# Patient Record
Sex: FEMALE | Race: WHITE | Employment: OTHER | URBAN - METROPOLITAN AREA
[De-identification: names, ages, dates, MRNs, and addresses within clinical notes are randomized per-mention and may not be internally consistent; named-entity substitution may affect disease eponyms.]

---

## 2017-05-03 ENCOUNTER — TRANSFERRED RECORDS (OUTPATIENT)
Dept: HEALTH INFORMATION MANAGEMENT | Facility: CLINIC | Age: 77
End: 2017-05-03

## 2017-06-07 ENCOUNTER — TRANSFERRED RECORDS (OUTPATIENT)
Dept: HEALTH INFORMATION MANAGEMENT | Facility: CLINIC | Age: 77
End: 2017-06-07

## 2017-06-27 ENCOUNTER — TELEPHONE (OUTPATIENT)
Dept: FAMILY MEDICINE | Facility: CLINIC | Age: 77
End: 2017-06-27

## 2017-06-27 DIAGNOSIS — G50.0 TRIGEMINAL NEURALGIA: ICD-10-CM

## 2017-06-27 RX ORDER — CARBAMAZEPINE 100 MG/1
100 TABLET, EXTENDED RELEASE ORAL 2 TIMES DAILY
Qty: 60 TABLET | Refills: 0 | Status: SHIPPED | OUTPATIENT
Start: 2017-06-27 | End: 2017-06-27

## 2017-06-27 RX ORDER — CARBAMAZEPINE 100 MG/1
100 TABLET, EXTENDED RELEASE ORAL 2 TIMES DAILY
Qty: 60 TABLET | Refills: 0 | Status: SHIPPED | OUTPATIENT
Start: 2017-06-27 | End: 2017-08-11

## 2017-06-27 NOTE — TELEPHONE ENCOUNTER
Reason for Call:  Medication or medication refill:    Do you use a [facility]?  Name of the pharmacy and phone number for the current request:  [facility].     Name of the medication requested: carBAMazepine (TEGRETOL XR) 100 MG 12 hr tablet    Other request:     Can we leave a detailed message on this number? YES    Phone number patient can be reached at: Cell number on file:    Telephone Information:   Mobile [phone]       Best Time: anytime    Call taken on 6/27/2017 at 10:40 AM by Layla Díaz

## 2017-06-27 NOTE — TELEPHONE ENCOUNTER
Rx for carBAMazepine (TEGRETOL XR) 100 MG 12 hr tablet faxed to [facility] in [facility].

## 2017-06-27 NOTE — TELEPHONE ENCOUNTER
Medication is being filled for 1 time refill only due to:  Patient needs to be seen because it has been more than one year since last visit.   Call to narrow down pharmacy requested , apt ,made for annual

## 2017-08-09 NOTE — PROGRESS NOTES
SUBJECTIVE:   Bee Yepez is a 76 year old female who presents for Preventive Visit.      Are you in the first 12 months of your Medicare Part B coverage?  No    Healthy Habits:    Do you get at least three servings of calcium containing foods daily (dairy, green leafy vegetables, etc.)? no, taking calcium     Amount of exercise or daily activities, outside of work: walking daily, bowling 2x/ week    Problems taking medications regularly No    Medication side effects: No    Have you had an eye exam in the past two years? yes    Do you see a dentist twice per year? yes    Do you have sleep apnea, excessive snoring or daytime drowsiness?no    COGNITIVE SCREEN  1) Repeat 3 items (Banana, Sunrise, Chair)    2) Clock draw: NORMAL  3) 3 item recall: Recalls 3 objects  Results: 3 items recalled: COGNITIVE IMPAIRMENT LESS LIKELY    Mini-CogTM Copyright S Quinton. Licensed by the author for use in [facility]; reprinted with permission ([email or web address]@[email or web address]). All rights reserved.        No additional concerns.   Med refills.   Declines screening DEXA or PNA shot.     Patient informed that anything we discuss that is not related to preventative medicine, may be billed for; patient verbalizes understanding.      Reviewed and updated as needed this visit by clinical staff  Tobacco  Allergies  Meds  Problems         Reviewed and updated as needed this visit by Provider        Social History   Substance Use Topics     Smoking status: Former Smoker     Smokeless tobacco: Not on file     Alcohol use No       The patient does not drink >3 drinks per day nor >7 drinks per week.    Today's PHQ-2 Score:   PHQ-2 ( 1999 Pfizer) 8/9/2017 6/17/2016   Q1: Little interest or pleasure in doing things 0 0   Q2: Feeling down, depressed or hopeless 0 0   PHQ-2 Score 0 0         Do you feel safe in your environment - Yes    Do you have a Health Care Directive?: No: Advance care planning reviewed with patient; information given to  patient to review.      Current providers sharing in care for this patient include: Patient Care Team:  Rosemary Rahman MD as PCP - General (Family Practice)      Hearing impairment: No    Ability to successfully perform activities of daily living: Yes, no assistance needed     Fall risk:  Fallen 2 or more times in the past year?: No  Any fall with injury in the past year?: No    Home safety:  throw rugs in the hallway      The following health maintenance items are reviewed in Epic and correct as of today:  Health Maintenance   Topic Date Due     DEXA SCAN SCREENING (SYSTEM ASSIGNED)  10/21/2004     PNEUMOCOCCAL (1 of 2 - PCV13) 10/21/2004     INFLUENZA VACCINE (SYSTEM ASSIGNED)  2017     LIPID SCREEN Q5 YR FEMALE (SYSTEM ASSIGNED)  2018     FALL RISK ASSESSMENT  2018     ADVANCE DIRECTIVE PLANNING Q5 YRS  2020     TETANUS IMMUNIZATION (SYSTEM ASSIGNED)  2025     Patient Active Problem List   Diagnosis     Family history of breast cancer     Family history of colon cancer     Advanced directives, counseling/discussion     Trigeminal neuralgia     Osteopenia, unspecified location     Past Surgical History:   Procedure Laterality Date     CHOLECYSTECTOMY      in the 1970s     TUBAL LIGATION         Social History   Substance Use Topics     Smoking status: Former Smoker     Smokeless tobacco: Not on file     Alcohol use No     Family History   Problem Relation Age of Onset     DIABETES No family hx of      Coronary Artery Disease Father      had a pacemaker     Breast Cancer Sister       at age 45     Colon Cancer Sister      Dx in her 60s     Lung Cancer Sister      Dx in her 60s         Current Outpatient Prescriptions   Medication Sig Dispense Refill     calcium carbonate-vitamin D 600-400 MG-UNIT CHEW Take 1 chew tab by mouth 2 times daily       latanoprost (XALATAN) 0.005 % ophthalmic solution Place 1 drop into both eyes At Bedtime 2.5 mL 0     multivitamin, therapeutic with  "minerals (MULTI-VITAMIN) TABS tablet Take 1 tablet by mouth daily 400 tablet 0     carBAMazepine (TEGRETOL XR) 100 MG 12 hr tablet Take 1 tablet (100 mg) by mouth 2 times daily 60 tablet 11     [DISCONTINUED] carBAMazepine (TEGRETOL XR) 100 MG 12 hr tablet Take 1 tablet (100 mg) by mouth 2 times daily 60 tablet 0     [DISCONTINUED] carBAMazepine (TEGRETOL XR) 100 MG 12 hr tablet Take 1 tablet (100 mg) by mouth 2 times daily 60 tablet 0     No Known Allergies      Pneumonia Vaccine: Declines    ROS:  Constitutional, HEENT, cardiovascular, pulmonary, GI, , musculoskeletal, neuro, skin, endocrine and psych systems are negative, except as otherwise noted.      OBJECTIVE:   /70  Pulse 85  Temp 97.6  F (36.4  C) (Tympanic)  Ht 5' 5\" (1.651 m)  Wt 165 lb 6.4 oz (75 kg)  SpO2 97%  Breastfeeding? No  BMI 27.52 kg/m2 Estimated body mass index is 27.52 kg/(m^2) as calculated from the following:    Height as of this encounter: 5' 5\" (1.651 m).    Weight as of this encounter: 165 lb 6.4 oz (75 kg).  EXAM:   GENERAL: healthy, alert and no distress  EYES: Eyes grossly normal to inspection, PERRL and conjunctivae and sclerae normal  HENT: ear canals and TM's normal, nose and mouth without ulcers or lesions  NECK: no adenopathy, no asymmetry, masses, or scars and thyroid normal to palpation  RESP: lungs clear to auscultation - no rales, rhonchi or wheezes  BREAST: normal without masses, tenderness or nipple discharge and no palpable axillary masses or adenopathy  CV: regular rate and rhythm, normal S1 S2, no S3 or S4, no murmur, click or rub, no peripheral edema and peripheral pulses strong  ABDOMEN: soft, nontender, no hepatosplenomegaly, no masses and bowel sounds normal  MS: no gross musculoskeletal defects noted, no edema  SKIN: no suspicious lesions or rashes  NEURO: Normal strength and tone, mentation intact and speech normal  PSYCH: mentation appears normal, affect normal/bright    DATA  Labs in process. " "    ASSESSMENT / PLAN:   Bee was seen today for physical.    Diagnoses and all orders for this visit:    Routine general medical examination at health care facility    Trigeminal neuralgia  -     Comprehensive metabolic panel  -     Refill: carBAMazepine (TEGRETOL XR) 100 MG 12 hr tablet; Take 1 tablet (100 mg) by mouth 2 times daily    Lipid screening  -     Lipid Profile (Chol, Trig, HDL, LDL calc)    Osteopenia, unspecified location  On Ca + D  Patient declines follow up DEXA screening    Advanced directives, counseling/discussion  Literature was provided to assist patient in preparing an advanced directive.        End of Life Planning:  Patient currently has an advanced directive: No.  I have verified the patient's ablity to prepare an advanced directive/make health care decisions.  Literature was provided to assist patient in preparing an advanced directive.    COUNSELING:  Reviewed preventive health counseling, as reflected in patient instructions       Regular exercise       Healthy diet/nutrition       Vision screening       Hearing screening    BP Screening:   Last 3 BP Readings:    BP Readings from Last 3 Encounters:   08/11/17 127/70   06/17/16 136/65   01/04/16 119/70       The following was recommended to the patient:  Re-screen BP within a year and recommended lifestyle modifications    Estimated body mass index is 27.52 kg/(m^2) as calculated from the following:    Height as of this encounter: 5' 5\" (1.651 m).    Weight as of this encounter: 165 lb 6.4 oz (75 kg).     reports that she has quit smoking. She does not have any smokeless tobacco history on file.        Appropriate preventive services were discussed with this patient, including applicable screening as appropriate for cardiovascular disease, diabetes, osteopenia/osteoporosis, and glaucoma.  As appropriate for age/gender, discussed screening for colorectal cancer, prostate cancer, breast cancer, and cervical cancer. Checklist reviewing " preventive services available has been given to the patient.    Reviewed patients plan of care and provided an AVS. The Basic Care Plan (routine screening as documented in Health Maintenance) for Bee meets the Care Plan requirement. This Care Plan has been established and reviewed with the Patient.    Counseling Resources:  ATP IV Guidelines  Pooled Cohorts Equation Calculator  Breast Cancer Risk Calculator  FRAX Risk Assessment  ICSI Preventive Guidelines  Dietary Guidelines for Americans, 2010  USDA's MyPlate  ASA Prophylaxis  Lung CA Screening    Follow up annually and as needed thoughout the year.    Rosemary Rahman MD  [facility]

## 2017-08-11 ENCOUNTER — OFFICE VISIT (OUTPATIENT)
Dept: FAMILY MEDICINE | Facility: CLINIC | Age: 77
End: 2017-08-11
Payer: COMMERCIAL

## 2017-08-11 VITALS
HEIGHT: 65 IN | BODY MASS INDEX: 27.56 KG/M2 | WEIGHT: 165.4 LBS | HEART RATE: 85 BPM | TEMPERATURE: 97.6 F | SYSTOLIC BLOOD PRESSURE: 127 MMHG | OXYGEN SATURATION: 97 % | DIASTOLIC BLOOD PRESSURE: 70 MMHG

## 2017-08-11 DIAGNOSIS — Z00.00 ROUTINE GENERAL MEDICAL EXAMINATION AT HEALTH CARE FACILITY: Primary | ICD-10-CM

## 2017-08-11 DIAGNOSIS — Z71.89 ADVANCED DIRECTIVES, COUNSELING/DISCUSSION: ICD-10-CM

## 2017-08-11 DIAGNOSIS — Z13.220 LIPID SCREENING: ICD-10-CM

## 2017-08-11 DIAGNOSIS — M85.80 OSTEOPENIA, UNSPECIFIED LOCATION: ICD-10-CM

## 2017-08-11 DIAGNOSIS — G50.0 TRIGEMINAL NEURALGIA: ICD-10-CM

## 2017-08-11 LAB
ALBUMIN SERPL-MCNC: 3.6 G/DL (ref 3.4–5)
ALP SERPL-CCNC: 78 U/L (ref 40–150)
ALT SERPL W P-5'-P-CCNC: 32 U/L (ref 0–50)
ANION GAP SERPL CALCULATED.3IONS-SCNC: 4 MMOL/L (ref 3–14)
AST SERPL W P-5'-P-CCNC: 20 U/L (ref 0–45)
BILIRUB SERPL-MCNC: 0.4 MG/DL (ref 0.2–1.3)
BUN SERPL-MCNC: 14 MG/DL (ref 7–30)
CALCIUM SERPL-MCNC: 9.1 MG/DL (ref 8.5–10.1)
CHLORIDE SERPL-SCNC: 105 MMOL/L (ref 94–109)
CHOLEST SERPL-MCNC: 208 MG/DL
CO2 SERPL-SCNC: 29 MMOL/L (ref 20–32)
CREAT SERPL-MCNC: 1.08 MG/DL (ref 0.52–1.04)
GFR SERPL CREATININE-BSD FRML MDRD: 49 ML/MIN/1.7M2
GLUCOSE SERPL-MCNC: 101 MG/DL (ref 70–99)
HDLC SERPL-MCNC: 51 MG/DL
LDLC SERPL CALC-MCNC: 129 MG/DL
NONHDLC SERPL-MCNC: 157 MG/DL
POTASSIUM SERPL-SCNC: 4.8 MMOL/L (ref 3.4–5.3)
PROT SERPL-MCNC: 7.4 G/DL (ref 6.8–8.8)
SODIUM SERPL-SCNC: 138 MMOL/L (ref 133–144)
TRIGL SERPL-MCNC: 141 MG/DL

## 2017-08-11 PROCEDURE — 36415 COLL VENOUS BLD VENIPUNCTURE: CPT | Performed by: FAMILY MEDICINE

## 2017-08-11 PROCEDURE — 80061 LIPID PANEL: CPT | Performed by: FAMILY MEDICINE

## 2017-08-11 PROCEDURE — 80053 COMPREHEN METABOLIC PANEL: CPT | Performed by: FAMILY MEDICINE

## 2017-08-11 PROCEDURE — 99397 PER PM REEVAL EST PAT 65+ YR: CPT | Performed by: FAMILY MEDICINE

## 2017-08-11 RX ORDER — MULTIPLE VITAMINS W/ MINERALS TAB 9MG-400MCG
1 TAB ORAL DAILY
Qty: 400 TABLET | Refills: 0 | COMMUNITY
Start: 2017-08-11 | End: 2019-02-07

## 2017-08-11 RX ORDER — CARBAMAZEPINE 100 MG/1
100 TABLET, EXTENDED RELEASE ORAL 2 TIMES DAILY
Qty: 60 TABLET | Refills: 0 | Status: SHIPPED | OUTPATIENT
Start: 2017-08-11 | End: 2017-08-11

## 2017-08-11 RX ORDER — CARBAMAZEPINE 100 MG/1
100 TABLET, EXTENDED RELEASE ORAL 2 TIMES DAILY
Qty: 60 TABLET | Refills: 11 | Status: SHIPPED | OUTPATIENT
Start: 2017-08-11 | End: 2017-12-01

## 2017-08-11 RX ORDER — LATANOPROST 50 UG/ML
1 SOLUTION/ DROPS OPHTHALMIC AT BEDTIME
Qty: 2.5 ML | Refills: 0 | COMMUNITY
Start: 2017-08-11 | End: 2021-02-11

## 2017-08-11 NOTE — LETTER
Bee Yepez  [address] [place] [address]  [place] [address]        August 16, 2017          Dear ,    We are writing to inform you of your test results.      Your recent labs shows;     Cholesterol panel showed that your cholesterol is slightly elevated but improved compared to 4 years ago.   Your LDL (bad cholesterol) goal is < 130. Yours was 129. HDL (good cholesterol) was within normal range.   No cholesterol lowering medications are recommended at this time.     Complete Metabolic Panel (panel that checks liver function, kidney function and electrolytes)   Showed slight decline in kidney function otherwise normal.   Increase your fluid intake.     Recommend that we repeat in the next 2 weeks. Please schedule a lab only appointment.       Resulted Orders   Lipid Profile (Chol, Trig, HDL, LDL calc)   Result Value Ref Range    Cholesterol 208 (H) <200 mg/dL      Comment:      Desirable:       <200 mg/dl    Triglycerides 141 <150 mg/dL      Comment:      Fasting specimen    HDL Cholesterol 51 >49 mg/dL    LDL Cholesterol Calculated 129 (H) <100 mg/dL      Comment:      Above desirable:  100-129 mg/dl   Borderline High:  130-159 mg/dL   High:             160-189 mg/dL   Very high:       >189 mg/dl      Non HDL Cholesterol 157 (H) <130 mg/dL      Comment:      Above Desirable:  130-159 mg/dl   Borderline high:  160-189 mg/dl   High:             190-219 mg/dl   Very high:       >219 mg/dl     Comprehensive metabolic panel   Result Value Ref Range    Sodium 138 133 - 144 mmol/L    Potassium 4.8 3.4 - 5.3 mmol/L    Chloride 105 94 - 109 mmol/L    Carbon Dioxide 29 20 - 32 mmol/L    Anion Gap 4 3 - 14 mmol/L    Glucose 101 (H) 70 - 99 mg/dL      Comment:      Fasting specimen    Urea Nitrogen 14 7 - 30 mg/dL    Creatinine 1.08 (H) 0.52 - 1.04 mg/dL    GFR Estimate 49 (L) >60 mL/min/1.7m2      Comment:      Non  GFR Calc    GFR Estimate If Black 59 (L) >60 mL/min/1.7m2      Comment:        American GFR Calc    Calcium 9.1 8.5 - 10.1 mg/dL    Bilirubin Total 0.4 0.2 - 1.3 mg/dL    Albumin 3.6 3.4 - 5.0 g/dL    Protein Total 7.4 6.8 - 8.8 g/dL    Alkaline Phosphatase 78 40 - 150 U/L    ALT 32 0 - 50 U/L    AST 20 0 - 45 U/L       If you have any questions or concerns, please call the clinic at the number listed above.       Sincerely,    Rosemary Rahman MD/lashell

## 2017-08-11 NOTE — NURSING NOTE
"Chief Complaint   Patient presents with     Physical       Initial /70  Pulse 85  Temp 97.6  F (36.4  C) (Tympanic)  Ht 5' 5\" (1.651 m)  Wt 165 lb 6.4 oz (75 kg)  SpO2 97%  Breastfeeding? No  BMI 27.52 kg/m2 Estimated body mass index is 27.52 kg/(m^2) as calculated from the following:    Height as of this encounter: 5' 5\" (1.651 m).    Weight as of this encounter: 165 lb 6.4 oz (75 kg).  Medication Reconciliation: complete     HM: pt declines PNA shot and dexa scan.     Kavya Alonso MA      "

## 2017-08-11 NOTE — MR AVS SNAPSHOT
After Visit Summary   8/11/2017    Bee Yepez    MRN: [id number]           Patient Information     Date Of Birth          1940        Visit Information        Provider Department      8/11/2017 9:30 AM Rosemary Rahman MD [facility]        Today's Diagnoses     Routine general medical examination at health care facility    -  1    Trigeminal neuralgia        Lipid screening        Osteopenia, unspecified location          Care Instructions      Preventive Health Recommendations    Female Ages 65 +    Yearly exam:     See your health care provider every year in order to  o Review health changes.   o Discuss preventive care.    o Review your medicines if your doctor has prescribed any.      You no longer need a yearly Pap test unless you've had an abnormal Pap test in the past 10 years. If you have vaginal symptoms, such as bleeding or discharge, be sure to talk with your provider about a Pap test.      Every 1 to 2 years, have a mammogram.  If you are over 69, talk with your health care provider about whether or not you want to continue having screening mammograms.      Every 10 years, have a colonoscopy. Or, have a yearly FIT test (stool test). These exams will check for colon cancer.       Have a cholesterol test every 5 years, or more often if your doctor advises it.       Have a diabetes test (fasting glucose) every three years. If you are at risk for diabetes, you should have this test more often.       At age 65, have a bone density scan (DEXA) to check for osteoporosis (brittle bone disease).    Shots:    Get a flu shot each year.    Get a tetanus shot every 10 years.    Talk to your doctor about your pneumonia vaccines. There are now two you should receive - Pneumovax (PPSV 23) and Prevnar (PCV 13).    Talk to your doctor about the shingles vaccine.    Talk to your doctor about the hepatitis B vaccine.    Nutrition:     Eat at least 5 servings of fruits and vegetables  "each day.      Eat whole-grain bread, whole-wheat pasta and brown rice instead of white grains and rice.      Talk to your provider about Calcium and Vitamin D.     Lifestyle    Exercise at least 150 minutes a week (30 minutes a day, 5 days a week). This will help you control your weight and prevent disease.      Limit alcohol to one drink per day.      No smoking.       Wear sunscreen to prevent skin cancer.       See your dentist twice a year for an exam and cleaning.      See your eye doctor every 1 to 2 years to screen for conditions such as glaucoma, macular degeneration and cataracts.          Follow-ups after your visit        Follow-up notes from your care team     Return in about 1 year (around 8/11/2018).      Who to contact     Normal or non-critical lab and imaging results will be communicated to you by [employer], letter or phone within 4 business days after the clinic has received the results. If you do not hear from us within 7 days, please contact the clinic through [employer] or phone. If you have a critical or abnormal lab result, we will notify you by phone as soon as possible.  Submit refill requests through [employer] or call your pharmacy and they will forward the refill request to us. Please allow 3 business days for your refill to be completed.          If you need to speak with a  for additional information , please call: [phone]             Additional Information About Your Visit        [employer] Information     [employer] lets you send messages to your doctor, view your test results, renew your prescriptions, schedule appointments and more. To sign up, go to [email or web address] . Click on \"Log in\" on the left side of the screen, which will take you to the Welcome page. Then click on \"Sign up Now\" on the right side of the page.     You will be asked to enter the access code listed below, as well as some personal information. Please follow the directions to create your username " "and password.     Your access code is: [email or web address]  Expires: 10/14/2017  5:34 PM     Your access code will  in 90 days. If you need help or a new code, please call your [place] clinic or [phone].        Care EveryWhere ID     This is your Care EveryWhere ID. This could be used by other organizations to access your [facility] medical records  [id number]        Your Vitals Were     Pulse Temperature Height Pulse Oximetry Breastfeeding? BMI (Body Mass Index)    85 97.6  F (36.4  C) (Tympanic) 5' 5\" (1.651 m) 97% No 27.52 kg/m2       Blood Pressure from Last 3 Encounters:   17 127/70   16 136/65   16 119/70    Weight from Last 3 Encounters:   17 165 lb 6.4 oz (75 kg)   16 165 lb (74.8 kg)   16 168 lb 3.2 oz (76.3 kg)              We Performed the Following     Comprehensive metabolic panel     Lipid Profile (Chol, Trig, HDL, LDL calc)          Today's Medication Changes          These changes are accurate as of: 17  9:50 AM.  If you have any questions, ask your nurse or doctor.               Start taking these medicines.        Dose/Directions    carBAMazepine 100 MG 12 hr tablet   Commonly known as:  TEGretol XR   Used for:  Trigeminal neuralgia   Started by:  Rosemary Rahman MD        Dose:  100 mg   Take 1 tablet (100 mg) by mouth 2 times daily   Quantity:  60 tablet   Refills:  11            Where to get your medicines      Some of these will need a paper prescription and others can be bought over the counter.  Ask your nurse if you have questions.     Bring a paper prescription for each of these medications     carBAMazepine 100 MG 12 hr tablet                Primary Care Provider Office Phone # Fax #    Rosemary Rahman [phone]       [address] [place] [address]        Equal Access to Services     [facility] YOLY AH: Hadii helio ku hadasho Soomaali, waaxda luqadaha, qaybta kaalmada shruthi, mark anthony bustillo. " So [facility] [phone].    ATENCIÓN: Si lilia pacheco, tiene a narayanan disposición servicios gratuitos de asistencia lingüística. Ankur rebolledo [phone].    We comply with applicable federal civil rights laws and [place] laws. We do not discriminate on the basis of race, color, national origin, age, disability sex, sexual orientation or gender identity.            Thank you!     Thank you for choosing [facility]  for your care. Our goal is always to provide you with excellent care. Hearing back from our patients is one way we can continue to improve our services. Please take a few minutes to complete the written survey that you may receive in the mail after your visit with us. Thank you!             Your Updated Medication List - Protect others around you: Learn how to safely use, store and throw away your medicines at www.disposemymeds.org.          This list is accurate as of: 8/11/17  9:50 AM.  Always use your most recent med list.                   Brand Name Dispense Instructions for use Diagnosis    calcium carbonate-vitamin D 600-400 MG-UNIT Chew      Take 1 chew tab by mouth 2 times daily        carBAMazepine 100 MG 12 hr tablet    TEGretol XR    60 tablet    Take 1 tablet (100 mg) by mouth 2 times daily    Trigeminal neuralgia       Multi-vitamin Tabs tablet     400 tablet    Take 1 tablet by mouth daily        XALATAN 0.005 % ophthalmic solution   Generic drug:  latanoprost     2.5 mL    Place 1 drop into both eyes At Bedtime

## 2017-08-15 DIAGNOSIS — N28.9 RENAL INSUFFICIENCY: Primary | ICD-10-CM

## 2017-08-29 DIAGNOSIS — N28.9 RENAL INSUFFICIENCY: ICD-10-CM

## 2017-08-29 LAB
CREAT SERPL-MCNC: 0.94 MG/DL (ref 0.52–1.04)
GFR SERPL CREATININE-BSD FRML MDRD: 58 ML/MIN/1.7M2

## 2017-08-29 PROCEDURE — 36415 COLL VENOUS BLD VENIPUNCTURE: CPT | Performed by: FAMILY MEDICINE

## 2017-08-29 PROCEDURE — 82565 ASSAY OF CREATININE: CPT | Performed by: FAMILY MEDICINE

## 2017-09-06 ENCOUNTER — TRANSFERRED RECORDS (OUTPATIENT)
Dept: HEALTH INFORMATION MANAGEMENT | Facility: CLINIC | Age: 77
End: 2017-09-06

## 2017-12-01 ENCOUNTER — TELEPHONE (OUTPATIENT)
Dept: FAMILY MEDICINE | Facility: CLINIC | Age: 77
End: 2017-12-01

## 2017-12-01 DIAGNOSIS — G50.0 TRIGEMINAL NEURALGIA: Primary | ICD-10-CM

## 2017-12-01 RX ORDER — CARBAMAZEPINE 200 MG/1
200 TABLET, EXTENDED RELEASE ORAL 2 TIMES DAILY
Qty: 60 TABLET | Refills: 0 | Status: SHIPPED | OUTPATIENT
Start: 2017-12-01 | End: 2017-12-26

## 2017-12-01 NOTE — TELEPHONE ENCOUNTER
Recommend referral to Neurology. Referral completed  In the interim increase the Tegretol to 200 mg BID. Rx sent, can finish up what she has left.   Ok to try the mouth guard and talk with her dentist about the symptoms

## 2017-12-01 NOTE — TELEPHONE ENCOUNTER
Aleksander states she would like to talk to you about her trigeminal neuralgia symptoms.  Please call.    Thank you.

## 2017-12-01 NOTE — TELEPHONE ENCOUNTER
"Spoke with patient and she reports her trigeminal neuralgia symptoms have been worsening over the past several months.  Symptoms are discribed as pain to her jaw, the same as the past 2 years.  She reports the pain \"attacks\" have been happening daily, sometimes a couple times a day.  She says brushing her teeth or talking will trigger the attack.  She is taking the tegretol as directed and feels it is not working.  She is asking what else can she could try, asks if she should try a mouth guard?  Or talk to her dentist?  Patient asking for your recommendations  Mindi Sarah RN    .      "

## 2017-12-04 ENCOUNTER — TELEPHONE (OUTPATIENT)
Dept: FAMILY MEDICINE | Facility: CLINIC | Age: 77
End: 2017-12-04

## 2017-12-04 NOTE — TELEPHONE ENCOUNTER
Request given to Jim in Xray. Will have report and CD of MRI at  today. Patient may  anytime after 12. Patient informed

## 2017-12-04 NOTE — TELEPHONE ENCOUNTER
Patient needs to  records of MRI Head scan done in 12/15. She also needs the reports that go with it. For a [facility] appt. Please call to notify. Ok to leave a message.

## 2017-12-26 DIAGNOSIS — G50.0 TRIGEMINAL NEURALGIA: ICD-10-CM

## 2017-12-26 NOTE — TELEPHONE ENCOUNTER
Routing refill request to provider for review/approval because:  Labs not current:  Cbc,tsh and carbamazepine level  Increase in dose on 12/1/17

## 2017-12-27 RX ORDER — CARBAMAZEPINE 200 MG/1
200 TABLET, EXTENDED RELEASE ORAL 2 TIMES DAILY
Qty: 60 TABLET | Refills: 0 | Status: SHIPPED | OUTPATIENT
Start: 2017-12-27 | End: 2017-12-28

## 2017-12-28 DIAGNOSIS — G50.0 TRIGEMINAL NEURALGIA: ICD-10-CM

## 2017-12-28 NOTE — TELEPHONE ENCOUNTER
Anticonvulsants Protocol Pmxves35/28 11:09 AM   Review Authorizing provider's last note.     Normal CBC on file in past 6 months    Normal TSH on file in past 6 months    Carbamazepine level within therapeutic range in last 6 months

## 2017-12-29 RX ORDER — CARBAMAZEPINE 200 MG/1
200 TABLET, EXTENDED RELEASE ORAL 2 TIMES DAILY
Qty: 60 TABLET | Refills: 0 | Status: SHIPPED | OUTPATIENT
Start: 2017-12-29 | End: 2019-02-07

## 2018-01-01 DIAGNOSIS — G50.0 TRIGEMINAL NEURALGIA: ICD-10-CM

## 2018-01-02 NOTE — TELEPHONE ENCOUNTER
Carbamazepine  Last Written Prescription Date:  12/01/17  Last Fill Quantity: 60,  # refills: 0   Last Office Visit with G, P or [facility] prescribing provider:  08/11/17   Future Office Visit:

## 2018-01-02 NOTE — TELEPHONE ENCOUNTER
Routing refill request to provider for review/approval because:      Anticonvulsants Protocol Failed1/2 4:05 PM   Review Authorizing provider's last note.     Normal CBC on file in past 6 months    Normal TSH on file in past 6 months    Carbamazepine level within therapeutic range in last 6 months

## 2018-01-03 RX ORDER — CARBAMAZEPINE 200 MG/1
TABLET, EXTENDED RELEASE ORAL
Qty: 60 TABLET | Refills: 0 | Status: SHIPPED | OUTPATIENT
Start: 2018-01-03 | End: 2018-01-10

## 2018-01-05 ENCOUNTER — TRANSFERRED RECORDS (OUTPATIENT)
Dept: HEALTH INFORMATION MANAGEMENT | Facility: CLINIC | Age: 78
End: 2018-01-05

## 2018-01-05 LAB
ALT SERPL-CCNC: 19 U/L (ref 6–29)
AST SERPL-CCNC: 18 U/L (ref 10–35)
CREAT SERPL-MCNC: 1.06 MG/DL (ref 0.6–0.93)
GFR SERPL CREATININE-BSD FRML MDRD: 50 ML/MIN/1.73M2
GLUCOSE SERPL-MCNC: 96 MG/DL (ref 65–99)
POTASSIUM SERPL-SCNC: 4.9 MMOL/L (ref 3.5–5.3)

## 2018-01-10 ENCOUNTER — OFFICE VISIT (OUTPATIENT)
Dept: FAMILY MEDICINE | Facility: CLINIC | Age: 78
End: 2018-01-10
Payer: COMMERCIAL

## 2018-01-10 VITALS
WEIGHT: 170.4 LBS | TEMPERATURE: 98 F | OXYGEN SATURATION: 97 % | HEART RATE: 92 BPM | HEIGHT: 65 IN | SYSTOLIC BLOOD PRESSURE: 138 MMHG | BODY MASS INDEX: 28.39 KG/M2 | DIASTOLIC BLOOD PRESSURE: 82 MMHG

## 2018-01-10 DIAGNOSIS — H61.22 IMPACTED CERUMEN OF LEFT EAR: ICD-10-CM

## 2018-01-10 DIAGNOSIS — H25.9 AGE-RELATED CATARACT OF BOTH EYES, UNSPECIFIED AGE-RELATED CATARACT TYPE: ICD-10-CM

## 2018-01-10 DIAGNOSIS — R03.0 ELEVATED BLOOD PRESSURE READING WITHOUT DIAGNOSIS OF HYPERTENSION: ICD-10-CM

## 2018-01-10 DIAGNOSIS — Z71.89 ADVANCED DIRECTIVES, COUNSELING/DISCUSSION: ICD-10-CM

## 2018-01-10 DIAGNOSIS — Z01.818 PREOP GENERAL PHYSICAL EXAM: Primary | ICD-10-CM

## 2018-01-10 PROCEDURE — 99214 OFFICE O/P EST MOD 30 MIN: CPT | Mod: 25 | Performed by: FAMILY MEDICINE

## 2018-01-10 PROCEDURE — 69209 REMOVE IMPACTED EAR WAX UNI: CPT | Mod: LT | Performed by: FAMILY MEDICINE

## 2018-01-10 RX ORDER — PREDNISOLONE ACETATE 10 MG/ML
SUSPENSION/ DROPS OPHTHALMIC
COMMUNITY
Start: 2018-01-05 | End: 2019-02-07

## 2018-01-10 RX ORDER — KETOROLAC TROMETHAMINE 5 MG/ML
SOLUTION OPHTHALMIC
COMMUNITY
Start: 2018-01-05 | End: 2019-02-07

## 2018-01-10 RX ORDER — GATIFLOXACIN 5 MG/ML
SOLUTION/ DROPS OPHTHALMIC
COMMUNITY
Start: 2018-01-05 | End: 2019-02-07

## 2018-01-10 NOTE — PATIENT INSTRUCTIONS
Before Your Surgery      Call your surgeon if there is any change in your health. This includes signs of a cold or flu (such as a sore throat, runny nose, cough, rash or fever).    Do not smoke, drink alcohol or take over the counter medicine (unless your surgeon or primary care doctor tells you to) for the 24 hours before and after surgery.    If you take prescribed drugs: Follow your doctor s orders about which medicines to take and which to stop until after surgery.    Eating and drinking prior to surgery: follow the instructions from your surgeon    Take a shower or bath the night before surgery. Use the soap your surgeon gave you to gently clean your skin. If you do not have soap from your surgeon, use your regular soap. Do not shave or scrub the surgery site.  Wear clean pajamas and have clean sheets on your bed.     Pre-operative medication management   1. Stop over the counter vitamins and Fish Oils  2. Do not take OTC  Asprin/NSAIDS at least 7-10 days prior to surgery.  4. May take the Carbamazepine the morning of surgery with small sips of water.

## 2018-01-10 NOTE — PROGRESS NOTES
[facility] (formerly [facility] [place]  [address]  [place] [address]  [phone]  Dept: [phone]    PRE-OP EVALUATION:  Today's date: 1/10/2018    Bee Yepez (: 1940) presents for pre-operative evaluation assessment as requested by Dr. Patrick Riedel.  She requires evaluation and anesthesia risk assessment prior to undergoing surgery/procedure for treatment of cataracts .  Proposed procedure: cataract surgery- bilateral    Date of Surgery/ Procedure: 18 (right eye) 18 (left eye)  Time of Surgery/ Procedure: to be determined   Hospital/Surgical Facility: [facility] [place]  Fax number for surgical facility: [phone]  Primary Physician: Rosemary Rahman  Type of Anesthesia Anticipated: to be determined    Patient has a Health Care Directive or Living Will:  NO    1. NO - Do you have a history of heart attack, stroke, stent, bypass or surgery on an artery in the head, neck, heart or legs?  2. NO - Do you ever have any pain or discomfort in your chest?  3. NO - Do you have a history of  Heart Failure?  4. NO - Are you troubled by shortness of breath when: walking on the level, up a slight hill or at night?  5. NO - Do you currently have a cold, bronchitis or other respiratory infection?  6. NO - Do you have a cough, shortness of breath or wheezing?  7. NO - Do you sometimes get pains in the calves of your legs when you walk?  8. NO - Do you or anyone in your family have previous history of blood clots?  9. NO - Do you or does anyone in your family have a serious bleeding problem such as prolonged bleeding following surgeries or cuts?  10. NO - Have you ever had problems with anemia or been told to take iron pills?  11. NO - Have you had any abnormal blood loss such as black, tarry or bloody stools, or abnormal vaginal bleeding?  12. NO - Have you ever had a blood transfusion?  13. NO - Have you or any of your relatives ever had problems with anesthesia?  14. NO - Do you  have sleep apnea, excessive snoring or daytime drowsiness?  15. NO - Do you have any prosthetic heart valves?  16. NO - Do you have prosthetic joints?  17. NO - Is there any chance that you may be pregnant?        HPI:                                                      Brief HPI related to upcoming procedure:     77 year old pleasant female patient of mine here for a Pre-op exam.   She has bilateral cataracts and wishes to proceed with extraction. States that she understands the risks and benefits of the procedure and wishes to proceed.     Feels well. No concerns today.   Has a known history of trigeminal neuralgia on Carbamazepine, currently under the care of a Neurologist.      See problem list for active medical problems.  Problems all longstanding and stable, except as noted/documented.  See ROS for pertinent symptoms related to these conditions.                                                                                                  .    MEDICAL HISTORY:                                                    Patient Active Problem List    Diagnosis Date Noted     Osteopenia, unspecified location 08/11/2017     Priority: Medium     Advanced directives, counseling/discussion 11/30/2015     Priority: Medium     Advance Care Planning 11/30/2015: ACP Review and Resources Provided:  Reviewed chart for advance care plan.  Bee Yepez has no plan or code status on file. Discussed available resources and provided with information.  Added by Kavya Alonso           Trigeminal neuralgia 11/30/2015     Priority: Medium     Family history of breast cancer 10/21/2015     Priority: Medium     Family history of colon cancer 10/21/2015     Priority: Medium      Past Medical History:   Diagnosis Date     Ex-smoker     3 PPD for 25 years ago, quit in 1985.     Family history of breast cancer     sister at age 45     Family history of colon cancer     sister in her 60s     Osteopenia     per patient report.  "    Past Surgical History:   Procedure Laterality Date     CHOLECYSTECTOMY      in the 1970s     TUBAL LIGATION       Current Outpatient Prescriptions   Medication Sig Dispense Refill     carBAMazepine (TEGRETOL XR) 200 MG 12 hr tablet Take 1 tablet (200 mg) by mouth 2 times daily 60 tablet 0     calcium carbonate-vitamin D 600-400 MG-UNIT CHEW Take 1 chew tab by mouth 2 times daily       latanoprost (XALATAN) 0.005 % ophthalmic solution Place 1 drop into both eyes At Bedtime 2.5 mL 0     multivitamin, therapeutic with minerals (MULTI-VITAMIN) TABS tablet Take 1 tablet by mouth daily 400 tablet 0     ketorolac (ACULAR) 0.5 % ophthalmic solution        prednisoLONE acetate (PRED FORTE) 1 % ophthalmic susp        gatifloxacin (ZYMAXID) 0.5 % ophthalmic solution        [DISCONTINUED] carBAMazepine (TEGRETOL XR) 200 MG 12 hr tablet TAKE ONE TABLET BY MOUTH TWICE DAILY 60 tablet 0     OTC products: None, except as noted above    No Known Allergies   Latex Allergy: NO    Social History   Substance Use Topics     Smoking status: Former Smoker     Smokeless tobacco: Not on file     Alcohol use No     History   Drug Use No       REVIEW OF SYSTEMS:                                                    Constitutional, neuro, ENT, endocrine, pulmonary, cardiac, gastrointestinal, genitourinary, musculoskeletal, integument and psychiatric systems are negative, except as otherwise noted.      EXAM:                                                    /82  Pulse 92  Temp 98  F (36.7  C) (Tympanic)  Ht 5' 5\" (1.651 m)  Wt 170 lb 6.4 oz (77.3 kg)  SpO2 97%  Breastfeeding? No  BMI 28.36 kg/m2    GENERAL APPEARANCE: healthy, alert and no distress     EYES: EOMI, PERRL     HENT: cerumen impaction of the left ear canal, unable to visualize the left TM. Right TM's normal and nose and mouth without ulcers or lesions     NECK: no adenopathy, no asymmetry, masses, or scars and thyroid normal to palpation     RESP: lungs clear to " auscultation - no rales, rhonchi or wheezes     CV: regular rates and rhythm, normal S1 S2, no S3 or S4 and no murmur, click or rub     ABDOMEN:  soft, nontender, no HSM or masses and bowel sounds normal     MS: extremities normal- no gross deformities noted, no evidence of inflammation in joints, FROM in all extremities.     SKIN: no suspicious lesions or rashes     NEURO: Normal strength and tone, sensory exam grossly normal, mentation intact and speech normal     PSYCH: mentation appears normal. and affect normal/bright     LYMPHATICS: No axillary, cervical, or supraclavicular nodes    DIAGNOSTICS:                                                    No labs or EKG required for low risk surgery (cataract, skin procedure, breast biopsy, etc)    Recent Labs   Lab Test  08/29/17   0856  08/11/17   0952   NA   --   138   POTASSIUM   --   4.8   CR  0.94  1.08*        IMPRESSION:                                                    Reason for surgery/procedure: Bilateral cataract extraction  Diagnosis/reason for consult: Bilateral Cataracts    The proposed surgical procedure is considered LOW risk.    REVISED CARDIAC RISK INDEX  The patient has the following serious cardiovascular risks for perioperative complications such as (MI, PE, VFib and 3  AV Block):  No serious cardiac risks  INTERPRETATION: 0 risks: Class I (very low risk - 0.4% complication rate)    The patient has the following additional risks for perioperative complications:  No identified additional risks      ICD-10-CM    1. Preop general physical exam Z01.818    2. Age-related cataract of both eyes, unspecified age-related cataract type H25.9    3. Elevated blood pressure reading without diagnosis of hypertension R03.0    4. Impacted cerumen of left ear H61.22 HC REMOVAL IMPACTED CERUMEN IRRIGATION/LVG UNILAT  See Nursing notes for details.    5. Advanced directives, counseling/discussion Z71.89        RECOMMENDATIONS:                                                         Cardiovascular Risk  None identified      Pulmonary Risk  None identified          --Patient is to take all scheduled medications on the day of surgery EXCEPT for modifications listed below.    1. Stop over the counter vitamins and Fish Oils  2. Do not take OTC  Asprin/NSAIDS at least 7-10 days prior to surgery.  4. May take the Carbamazepine the morning of surgery with small sips of water.    APPROVAL GIVEN to proceed with proposed procedure, without further diagnostic evaluation       Signed Electronically by: Rosemary Rahman MD    Copy of this evaluation report is provided to requesting physician.    [place] Preop Guidelines

## 2018-01-10 NOTE — MR AVS SNAPSHOT
After Visit Summary   1/10/2018    Bee Yepez    MRN: [id number]           Patient Information     Date Of Birth          1940        Visit Information        Provider Department      1/10/2018 10:30 AM Rosemary Rahman MD [facility]        Today's Diagnoses     Preop general physical exam    -  1    Age-related cataract of both eyes, unspecified age-related cataract type        Elevated blood pressure reading without diagnosis of hypertension        Impacted cerumen of left ear        Advanced directives, counseling/discussion          Care Instructions      Before Your Surgery      Call your surgeon if there is any change in your health. This includes signs of a cold or flu (such as a sore throat, runny nose, cough, rash or fever).    Do not smoke, drink alcohol or take over the counter medicine (unless your surgeon or primary care doctor tells you to) for the 24 hours before and after surgery.    If you take prescribed drugs: Follow your doctor s orders about which medicines to take and which to stop until after surgery.    Eating and drinking prior to surgery: follow the instructions from your surgeon    Take a shower or bath the night before surgery. Use the soap your surgeon gave you to gently clean your skin. If you do not have soap from your surgeon, use your regular soap. Do not shave or scrub the surgery site.  Wear clean pajamas and have clean sheets on your bed.     Pre-operative medication management   1. Stop over the counter vitamins and Fish Oils  2. Do not take OTC  Asprin/NSAIDS at least 7-10 days prior to surgery.  4. May take the Carbamazepine the morning of surgery with small sips of water.            Follow-ups after your visit        Follow-up notes from your care team     Return in about 7 months (around 8/10/2018) for Physical Exam and as needed throughout the year.      Who to contact     Normal or non-critical lab and imaging results will be  "communicated to you by In[employer]ish[employer], letter or phone within 4 business days after the clinic has received the results. If you do not hear from us within 7 days, please contact the clinic through [employer] or phone. If you have a critical or abnormal lab result, we will notify you by phone as soon as possible.  Submit refill requests through [employer] or call your pharmacy and they will forward the refill request to us. Please allow 3 business days for your refill to be completed.          If you need to speak with a  for additional information , please call: [phone]             Additional Information About Your Visit        [employer] Information     [employer]Wave lets you send messages to your doctor, view your test results, renew your prescriptions, schedule appointments and more. To sign up, go to [email or web address] . Click on \"Log in\" on the left side of the screen, which will take you to the Welcome page. Then click on \"Sign up Now\" on the right side of the page.     You will be asked to enter the access code listed below, as well as some personal information. Please follow the directions to create your username and password.     Your access code is: [email or web address]  Expires: 4/10/2018 11:02 AM     Your access code will  in 90 days. If you need help or a new code, please call your [place] clinic or [phone].        Care EveryWhere ID     This is your Care EveryWhere ID. This could be used by other organizations to access your [place] medical records  [id number]        Your Vitals Were     Pulse Temperature Height Pulse Oximetry Breastfeeding? BMI (Body Mass Index)    92 98  F (36.7  C) (Tympanic) 5' 5\" (1.651 m) 97% No 28.36 kg/m2       Blood Pressure from Last 3 Encounters:   01/10/18 142/69   17 127/70   16 136/65    Weight from Last 3 Encounters:   01/10/18 170 lb 6.4 oz (77.3 kg)   17 165 lb 6.4 oz (75 kg)   16 165 lb (74.8 kg)              We Performed the " Following     HC REMOVAL IMPACTED CERUMEN IRRIGATION/LVG UNILAT        Primary Care Provider Office Phone # Fax #    Rosemary Rahman [phone]       [address] [place] [address]        Equal Access to Services     GEOVANYMERARY YOLY : Hadii aad ku hadasho Soomaali, waaxda luqadaha, qaybta kaalmada adeegyada, waxay idiin hayaan adeeg khxin laluzhelen bustillo. So [facility] [phone].    ATENCIÓN: Si habla español, tiene a narayanan disposición servicios gratuitos de asistencia lingüística. [facility] [phone].    We comply with applicable federal civil rights laws and [place] laws. We do not discriminate on the basis of race, color, national origin, age, disability, sex, sexual orientation, or gender identity.            Thank you!     Thank you for choosing [facility]  for your care. Our goal is always to provide you with excellent care. Hearing back from our patients is one way we can continue to improve our services. Please take a few minutes to complete the written survey that you may receive in the mail after your visit with us. Thank you!             Your Updated Medication List - Protect others around you: Learn how to safely use, store and throw away your medicines at www.disposemymeds.org.          This list is accurate as of: 1/10/18 11:02 AM.  Always use your most recent med list.                   Brand Name Dispense Instructions for use Diagnosis    calcium carbonate-vitamin D 600-400 MG-UNIT Chew      Take 1 chew tab by mouth 2 times daily        carBAMazepine 200 MG 12 hr tablet    TEGretol XR    60 tablet    Take 1 tablet (200 mg) by mouth 2 times daily    Trigeminal neuralgia       gatifloxacin 0.5 % ophthalmic solution    ZYMAXID          ketorolac 0.5 % ophthalmic solution    ACULAR          Multi-vitamin Tabs tablet     400 tablet    Take 1 tablet by mouth daily        prednisoLONE acetate 1 % ophthalmic susp    PRED FORTE          XALATAN 0.005 % ophthalmic solution    Generic drug:  latanoprost     2.5 mL    Place 1 drop into both eyes At Bedtime

## 2018-01-10 NOTE — NURSING NOTE
Patient presented to clinic for Left ear wash. Left ear(s) were inspected with an otoscope and found to have cerumen in the ear canal(s). The patient has not been using OTC debrox for 0 days prior to today. The patient's ear(s) were washed out with a hydrogen peroxide/water mix. The patient did tolerate the procedure well. Ear appeared to be clear of cerumen.      Kavya Alonso MA

## 2018-01-12 ENCOUNTER — TELEPHONE (OUTPATIENT)
Dept: FAMILY MEDICINE | Facility: CLINIC | Age: 78
End: 2018-01-12

## 2018-01-12 NOTE — TELEPHONE ENCOUNTER
[facility] calling to obtain the Pre-op Physical patient had with  on 1/10/18. Please send asap. Call to advise. Thank you  FAX#: [phone]

## 2018-07-25 ENCOUNTER — TELEPHONE (OUTPATIENT)
Dept: FAMILY MEDICINE | Facility: CLINIC | Age: 78
End: 2018-07-25

## 2018-07-25 NOTE — TELEPHONE ENCOUNTER
Called patient, states that she received a call from her Insurance-[facility] about alternate medications that may be cheaper than what she is currently taking.   Requesting me to be on the look out for that notification and states that she will also bring in any forms that she receives from her insurance.     Will forward to [employer] Secretaries as ROXANE

## 2018-07-25 NOTE — TELEPHONE ENCOUNTER
Patient is requesting a phone visit with pcp stated she has a lot to discuss with pcp and would rather do a phone visit than to come in office     Please call patient to set this appointment up    Thank you

## 2018-07-30 ENCOUNTER — TELEPHONE (OUTPATIENT)
Dept: FAMILY MEDICINE | Facility: CLINIC | Age: 78
End: 2018-07-30

## 2018-07-30 NOTE — TELEPHONE ENCOUNTER
Pharmacy is requesting a lower cost alternative to the patient's Carbamazepine ER 12 hour tab. Options could be Topiramate, Levetiracetam, or Lamotrigine.

## 2018-07-31 NOTE — TELEPHONE ENCOUNTER
Spoke with patient and informed her per Dr Rahman, see below read word for word.  Patient prefers to stay with current med.  Savi at [facility] pharmacy informed that patient will stay with Carbamazepine.

## 2018-07-31 NOTE — TELEPHONE ENCOUNTER
Please let patient know that the cheaper options offered are all secondary and tertiary options for the treatment of Trigeminal neuralgia. Carbamazepine is the first line therapy.   Would recommend Neurology consult if wanting to switch to the other less effective options. Let me know if she would like a Neuro consult.

## 2019-01-07 ENCOUNTER — TRANSFERRED RECORDS (OUTPATIENT)
Dept: HEALTH INFORMATION MANAGEMENT | Facility: CLINIC | Age: 79
End: 2019-01-07

## 2019-02-07 ENCOUNTER — OFFICE VISIT (OUTPATIENT)
Dept: FAMILY MEDICINE | Facility: CLINIC | Age: 79
End: 2019-02-07
Payer: COMMERCIAL

## 2019-02-07 VITALS
TEMPERATURE: 98.1 F | DIASTOLIC BLOOD PRESSURE: 78 MMHG | HEIGHT: 65 IN | BODY MASS INDEX: 29.36 KG/M2 | HEART RATE: 85 BPM | WEIGHT: 176.2 LBS | SYSTOLIC BLOOD PRESSURE: 135 MMHG

## 2019-02-07 DIAGNOSIS — R19.7 INTERMITTENT DIARRHEA: ICD-10-CM

## 2019-02-07 DIAGNOSIS — G50.0 TRIGEMINAL NEURALGIA: ICD-10-CM

## 2019-02-07 DIAGNOSIS — Z71.89 ADVANCED DIRECTIVES, COUNSELING/DISCUSSION: ICD-10-CM

## 2019-02-07 DIAGNOSIS — Z00.00 MEDICARE ANNUAL WELLNESS VISIT, SUBSEQUENT: Primary | ICD-10-CM

## 2019-02-07 DIAGNOSIS — Z13.220 LIPID SCREENING: ICD-10-CM

## 2019-02-07 DIAGNOSIS — Z13.1 SCREENING FOR DIABETES MELLITUS: ICD-10-CM

## 2019-02-07 DIAGNOSIS — Z78.0 POSTMENOPAUSAL ESTROGEN DEFICIENCY: ICD-10-CM

## 2019-02-07 PROCEDURE — 99213 OFFICE O/P EST LOW 20 MIN: CPT | Mod: 25 | Performed by: FAMILY MEDICINE

## 2019-02-07 PROCEDURE — 99397 PER PM REEVAL EST PAT 65+ YR: CPT | Performed by: FAMILY MEDICINE

## 2019-02-07 ASSESSMENT — MIFFLIN-ST. JEOR: SCORE: 1271.15

## 2019-02-07 NOTE — LETTER
[facility]  [address]  [place] [address]  [phone]        February 20, 2020    Bee Yepez  [address] [facility] [address]              Dear Bee Yepez    This is to remind you that your Dexa Scan is due.    You may call our office at [phone] to schedule an appointment.    Please disregard this notice if you have already had your labs drawn or made an appointment.        Sincerely,        Rosemary Rahman MD

## 2019-02-07 NOTE — PATIENT INSTRUCTIONS

## 2019-02-07 NOTE — PROGRESS NOTES
"  SUBJECTIVE:   Bee Yepez is a 78 year old female who presents for Preventive Visit.    Are you in the first 12 months of your Medicare Part B coverage?  No    Physical Health:    In general, how would you rate your overall physical health? excellent    Outside of work, how many days during the week do you exercise? none    Outside of work, approximately how many minutes a day do you exercise?not applicable    If you drink alcohol do you typically have >3 drinks per day or >7 drinks per week? No    Do you usually eat at least 4 servings of fruit and vegetables a day, include whole grains & fiber and avoid regularly eating high fat or \"junk\" foods? NO    Do you have any problems taking medications regularly?  No    Do you have any side effects from medications? not applicable    Needs assistance for the following daily activities: no assistance needed    Which of the following safety concerns are present in your home?  none identified     Hearing impairment: No    In the past 6 months, have you been bothered by leaking of urine? no    Mental Health:    In general, how would you rate your overall mental or emotional health? good  PHQ-2 Score:      Do you feel safe in your environment? Yes    Do you have a Health Care Directive? No: Advance care planning reviewed with patient; information given to patient to review.    Additional concerns to address?  YES        Loose Stools, intremittent  Has been an ongoing issue.    Will begin with abdominal cramps and then have explosive diarrhea, sometimes watery stools, unable to make it to the bathroom and soils her underwear and clothing. Very frustrating especially when in Public.   Tried making dietary adjustments-.eliminated dairy to see if it would give some relief but is unsure if that has helped at all.    Reporting 1 BM/ day.    Diarrhea episodes will occur about 1x q2 weeks.   States that her sister has similar problem. Denies having any symptoms at this time. "     History of right Trigeminal neuralgia. Currently controlled on Carbamazepine  200 mg BID. Following with neurologist at the [facility]. Recent office visit was on 2019- see Epic for progress notes for details.   States that she monitors labs annually.     HEALTH CARE MAINTENANCE: due for a Bone density scan.       Patient informed that anything we discuss that is not related to preventative medicine, may be billed for; patient verbalizes understanding.      Fall risk:  Fallen 2 or more times in the past year?: No  Any fall with injury in the past year?: Yes  Timed Up and Go Test (>13.5 is fall risk; contact physician) : 8      Cognitive Screenin) Repeat 3 items (Leader, Season, Table)    2) Clock draw: NORMAL  3) 3 item recall: Recalls 2 objects   Results: NORMAL clock, 1-2 items recalled: COGNITIVE IMPAIRMENT LESS LIKELY    Mini-CogTM Copyright S Quinton. Licensed by the author for use in [facility]; reprinted with permission (gavin@[facility]). All rights reserved.          Do you have sleep apnea, excessive snoring or daytime drowsiness?: no    Reviewed and updated as needed this visit by clinical staff  Tobacco  Allergies  Meds  Soc Hx        Reviewed and updated as needed this visit by Provider        Social History     Tobacco Use     Smoking status: Former Smoker     Smokeless tobacco: Never Used   Substance Use Topics     Alcohol use: No     Alcohol/week: 0.0 oz                           Current providers sharing in care for this patient include:   Patient Care Team:  Rosemary Rahman MD as PCP - General (Family Practice)  Rosemary Rahman MD as PCP - Assigned PCP    The following health maintenance items are reviewed in Epic and correct as of today:  Health Maintenance   Topic Date Due     ZOSTER IMMUNIZATION (1 of 2) 10/21/1989     DEXA SCAN SCREENING (SYSTEM ASSIGNED)  10/21/2004     PNEUMOCOCCAL IMMUNIZATION 65+ LOW/MEDIUM RISK (1 of 2 - PCV13) 10/21/2004     FALL RISK  ASSESSMENT  2018     PHQ-2 Q1 YR  2018     INFLUENZA VACCINE (1) 2018     LIPID SCREEN Q5 YR FEMALE (SYSTEM ASSIGNED)  2022     ADVANCE DIRECTIVE PLANNING Q5 YRS  01/10/2023     DTAP/TDAP/TD IMMUNIZATION (2 - Td) 2025     IPV IMMUNIZATION  Aged Out     MENINGITIS IMMUNIZATION  Aged Out     BP Readings from Last 3 Encounters:   19 135/78   01/10/18 138/82   17 127/70    Wt Readings from Last 3 Encounters:   19 79.9 kg (176 lb 3.2 oz)   01/10/18 77.3 kg (170 lb 6.4 oz)   17 75 kg (165 lb 6.4 oz)                  Patient Active Problem List   Diagnosis     Family history of breast cancer     Family history of colon cancer     Advanced directives, counseling/discussion     Trigeminal neuralgia     Osteopenia, unspecified location     Past Surgical History:   Procedure Laterality Date     CHOLECYSTECTOMY      in the 1970s     TUBAL LIGATION         Social History     Tobacco Use     Smoking status: Former Smoker     Smokeless tobacco: Never Used   Substance Use Topics     Alcohol use: No     Alcohol/week: 0.0 oz     Family History   Problem Relation Age of Onset     Diabetes No family hx of      Coronary Artery Disease Father         had a pacemaker     Breast Cancer Sister          at age 45     Colon Cancer Sister         Dx in her 60s     Lung Cancer Sister         Dx in her 60s         Current Outpatient Medications   Medication Sig Dispense Refill     calcium carbonate-vitamin D 600-400 MG-UNIT CHEW Take 1 chew tab by mouth 2 times daily       carBAMazepine (TEGRETOL XR) 200 MG 12 hr tablet TAKE ONE TABLET BY MOUTH TWICE DAILY 60 tablet 11     latanoprost (XALATAN) 0.005 % ophthalmic solution Place 1 drop into both eyes At Bedtime 2.5 mL 0     No Known Allergies      ROS:  Constitutional, HEENT, cardiovascular, pulmonary, GI, , musculoskeletal, neuro, skin, endocrine and psych systems are negative, except as otherwise noted.    OBJECTIVE:   /78    "Pulse 85   Temp 98.1  F (36.7  C) (Tympanic)   Ht 1.645 m (5' 4.75\")   Wt 79.9 kg (176 lb 3.2 oz)   BMI 29.55 kg/m   Estimated body mass index is 29.55 kg/m  as calculated from the following:    Height as of this encounter: 1.645 m (5' 4.75\").    Weight as of this encounter: 79.9 kg (176 lb 3.2 oz).  EXAM:   GENERAL: healthy, alert and no distress  EYES: Eyes grossly normal to inspection, PERRL and conjunctivae and sclerae normal  HENT: ear canals and TM's normal, nose and mouth without ulcers or lesions  NECK: no adenopathy, no asymmetry, masses, or scars and thyroid normal to palpation  RESP: lungs clear to auscultation - no rales, rhonchi or wheezes  BREAST: normal without masses, tenderness or nipple discharge and no palpable axillary masses or adenopathy  CV: regular rate and rhythm, normal S1 S2, no S3 or S4, no murmur, click or rub, no peripheral edema and peripheral pulses strong  ABDOMEN: soft, nontender, no hepatosplenomegaly, no masses and bowel sounds normal  MS: no gross musculoskeletal defects noted, no edema  SKIN: no suspicious lesions or rashes  NEURO: Normal strength and tone, mentation intact and speech normal  PSYCH: mentation appears normal, affect normal/bright    Diagnostic Test Results:  Future orders paced    ASSESSMENT / PLAN:   Bee was seen today for physical.    Diagnoses and all orders for this visit:    Medicare annual wellness visit, subsequent    Intermittent explosive diarrhea, asymptomatic at this time  Encouraged to keep a food diary  -     GASTROENTEROLOGY ADULT REF CONSULT ONLY    Trigeminal neuralgia, stable on medications  Following with Neurology at [facility]    Postmenopausal estrogen deficiency  -     DX Hip/Pelvis/Spine; Future    Lipid screening  -     Lipid Profile; Future    Screening for diabetes mellitus  -     Glucose; Future    Advanced directives, counseling/discussion  Literature was provided to assist patient in preparing an advanced " "directive          End of Life Planning:  Patient currently has an advanced directive: No.  I have verified the patient's ablity to prepare an advanced directive/make health care decisions.  Literature was provided to assist patient in preparing an advanced directive.    COUNSELING:  Reviewed preventive health counseling, as reflected in patient instructions       Regular exercise       Healthy diet/nutrition       Advanced Planning     BP Readings from Last 1 Encounters:   02/07/19 135/78     Estimated body mass index is 29.55 kg/m  as calculated from the following:    Height as of this encounter: 1.645 m (5' 4.75\").    Weight as of this encounter: 79.9 kg (176 lb 3.2 oz).    BP Screening:   Last 3 BP Readings:    BP Readings from Last 3 Encounters:   02/07/19 135/78   01/10/18 138/82   08/11/17 127/70       The following was recommended to the patient:  Re-screen BP within a year and recommended lifestyle modifications       reports that she has quit smoking. she has never used smokeless tobacco.      Appropriate preventive services were discussed with this patient, including applicable screening as appropriate for cardiovascular disease, diabetes, osteopenia/osteoporosis, and glaucoma.  As appropriate for age/gender, discussed screening for colorectal cancer, prostate cancer, breast cancer, and cervical cancer. Checklist reviewing preventive services available has been given to the patient.    Reviewed patients plan of care and provided an AVS. The Basic Care Plan (routine screening as documented in Health Maintenance) for Bee meets the Care Plan requirement. This Care Plan has been established and reviewed with the Patient.    Counseling Resources:  ATP IV Guidelines  Pooled Cohorts Equation Calculator  Breast Cancer Risk Calculator  FRAX Risk Assessment  ICSI Preventive Guidelines  Dietary Guidelines for Americans, 2010  USDA's MyPlate  ASA Prophylaxis  Lung CA Screening    Follow up annually and as needed " thoughout the year.      Rosemary Rahman MD  [facility]

## 2019-02-15 DIAGNOSIS — Z13.220 LIPID SCREENING: ICD-10-CM

## 2019-02-15 DIAGNOSIS — Z13.1 SCREENING FOR DIABETES MELLITUS: ICD-10-CM

## 2019-02-15 LAB
CHOLEST SERPL-MCNC: 225 MG/DL
GLUCOSE SERPL-MCNC: 95 MG/DL (ref 70–99)
HDLC SERPL-MCNC: 67 MG/DL
LDLC SERPL CALC-MCNC: 129 MG/DL
NONHDLC SERPL-MCNC: 158 MG/DL
TRIGL SERPL-MCNC: 146 MG/DL

## 2019-02-15 PROCEDURE — 82947 ASSAY GLUCOSE BLOOD QUANT: CPT | Performed by: FAMILY MEDICINE

## 2019-02-15 PROCEDURE — 36415 COLL VENOUS BLD VENIPUNCTURE: CPT | Performed by: FAMILY MEDICINE

## 2019-02-15 PROCEDURE — 80061 LIPID PANEL: CPT | Performed by: FAMILY MEDICINE

## 2019-03-14 ENCOUNTER — TELEPHONE (OUTPATIENT)
Dept: FAMILY MEDICINE | Facility: CLINIC | Age: 79
End: 2019-03-14

## 2019-03-14 NOTE — TELEPHONE ENCOUNTER
We got Referral for home care.  We spoke with her today.  She is driving, therefore we cannot open her up to home care.

## 2019-03-15 ENCOUNTER — OFFICE VISIT (OUTPATIENT)
Dept: FAMILY MEDICINE | Facility: CLINIC | Age: 79
End: 2019-03-15
Payer: COMMERCIAL

## 2019-03-15 VITALS
HEIGHT: 65 IN | WEIGHT: 174.8 LBS | SYSTOLIC BLOOD PRESSURE: 122 MMHG | BODY MASS INDEX: 29.12 KG/M2 | OXYGEN SATURATION: 98 % | DIASTOLIC BLOOD PRESSURE: 78 MMHG | HEART RATE: 72 BPM | TEMPERATURE: 98 F

## 2019-03-15 DIAGNOSIS — M62.81 GENERALIZED MUSCLE WEAKNESS: Primary | ICD-10-CM

## 2019-03-15 DIAGNOSIS — E83.42 HYPOMAGNESEMIA: ICD-10-CM

## 2019-03-15 DIAGNOSIS — J10.1 INFLUENZA A: ICD-10-CM

## 2019-03-15 DIAGNOSIS — I48.0 PAROXYSMAL ATRIAL FIBRILLATION (H): ICD-10-CM

## 2019-03-15 LAB — MAGNESIUM SERPL-MCNC: 2.3 MG/DL (ref 1.6–2.3)

## 2019-03-15 PROCEDURE — 36415 COLL VENOUS BLD VENIPUNCTURE: CPT | Performed by: FAMILY MEDICINE

## 2019-03-15 PROCEDURE — 93000 ELECTROCARDIOGRAM COMPLETE: CPT | Performed by: FAMILY MEDICINE

## 2019-03-15 PROCEDURE — 99214 OFFICE O/P EST MOD 30 MIN: CPT | Performed by: FAMILY MEDICINE

## 2019-03-15 PROCEDURE — 83735 ASSAY OF MAGNESIUM: CPT | Performed by: FAMILY MEDICINE

## 2019-03-15 RX ORDER — METOPROLOL TARTRATE 50 MG
25 TABLET ORAL
COMMUNITY
Start: 2019-03-11 | End: 2019-03-15

## 2019-03-15 RX ORDER — MULTIVITAMIN WITH IRON
2 TABLET ORAL DAILY
COMMUNITY
End: 2019-03-28

## 2019-03-15 RX ORDER — METOPROLOL TARTRATE 50 MG
25 TABLET ORAL 2 TIMES DAILY
Qty: 90 TABLET | Refills: 3 | Status: SHIPPED | OUTPATIENT
Start: 2019-03-15 | End: 2021-02-11

## 2019-03-15 ASSESSMENT — MIFFLIN-ST. JEOR: SCORE: 1264.8

## 2019-03-15 NOTE — TELEPHONE ENCOUNTER
Patient was seen in the clinic today- she is still weak and is not driving at this time. Please open her up to home care as previously ordered.

## 2019-03-15 NOTE — LETTER
March 18, 2019      Bee Yepez  [address] [place] [id number]        Dear ,    We are writing to inform you of your test results.    Your magnesium lab was normal.   You can discontinue Magnesium supplements at this time.   Resulted Orders   Magnesium   Result Value Ref Range    Magnesium 2.3 1.6 - 2.3 mg/dL       If you have any questions or concerns, please call the clinic at the number listed above.       Sincerely,        Rosemary Rahman MD/juhio

## 2019-03-15 NOTE — PROGRESS NOTES
SUBJECTIVE:   Bee Yepez is a 78 year old female who presents to clinic today for the following health issues:      Hospital Follow-up Visit:    Hospital/Nursing Home/IP Rehab Facility: [facility]  Date of Admission: 3/8/19  Date of Discharge: 3/11/19  Reason(s) for Admission:         Influenza A (Primary Dx);   Frequent falls;   Weakness;   Atrial fibrillation with RVR (HC);   Hypomagnesemia            Problems taking medications regularly:  None       Medication changes since discharge: yes, medication list is up to date.  Was unable to completeTamiflu because it would make her vomit.  2/5 doses were completed       Problems adhering to non-medication therapy:  None    Summary of hospitalization:  CareEverywhere information obtained and reviewed  Diagnostic Tests/Treatments reviewed.  Follow up needed: Cardiology  Other Healthcare Providers Involved in Patient s Care:         Homecare  Update since discharge: improved.     Post Discharge Medication Reconciliation: discharge medications reconciled, continue medications without change.  Plan of care communicated with patient     Coding guidelines for this visit:  Type of Medical   Decision Making Face-to-Face Visit       within 7 Days of discharge Face-to-Face Visit        within 14 days of discharge   Moderate Complexity [id number]   High Complexity [id number]            Patient was discharged from the Hospital with Home care orders. States that this was cancelled after they learned that she drove a block away from her home with her dog.   Patient is still needing help due to generalized weakness. Needed a ride to her appointment today. We'll request to re-instate orders.    Problem list and histories reviewed & adjusted, as indicated.  Additional history: as documented    Patient Active Problem List   Diagnosis     Family history of breast cancer     Family history of colon cancer     Advanced directives, counseling/discussion     Trigeminal neuralgia  "    Osteopenia, unspecified location     Past Surgical History:   Procedure Laterality Date     CHOLECYSTECTOMY      in the 1970s     TUBAL LIGATION         Social History     Tobacco Use     Smoking status: Former Smoker     Smokeless tobacco: Never Used   Substance Use Topics     Alcohol use: No     Alcohol/week: 0.0 oz     Family History   Problem Relation Age of Onset     Diabetes No family hx of      Coronary Artery Disease Father         had a pacemaker     Breast Cancer Sister          at age 45     Colon Cancer Sister         Dx in her 60s     Lung Cancer Sister         Dx in her 60s         Current Outpatient Medications   Medication Sig Dispense Refill     aspirin (ASA) 81 MG EC tablet Take 1 tablet (81 mg) by mouth daily       calcium carbonate-vitamin D 600-400 MG-UNIT CHEW Take 1 chew tab by mouth 2 times daily       carBAMazepine (TEGRETOL XR) 200 MG 12 hr tablet TAKE ONE TABLET BY MOUTH TWICE DAILY 60 tablet 11     latanoprost (XALATAN) 0.005 % ophthalmic solution Place 1 drop into both eyes At Bedtime 2.5 mL 0     magnesium 250 MG tablet Take 2 tablets by mouth daily       metoprolol tartrate (LOPRESSOR) 50 MG tablet Take 0.5 tablets (25 mg) by mouth 2 times daily 90 tablet 3     No Known Allergies    Reviewed and updated as needed this visit by clinical staff  Tobacco  Allergies  Meds       Reviewed and updated as needed this visit by Provider         ROS:  Constitutional, HEENT, cardiovascular, pulmonary, gi and gu systems are negative, except as otherwise noted.    OBJECTIVE:     /78   Pulse 72   Temp 98  F (36.7  C) (Tympanic)   Ht 1.645 m (5' 4.75\")   Wt 79.3 kg (174 lb 12.8 oz)   SpO2 98%   Breastfeeding? No   BMI 29.31 kg/m    Body mass index is 29.31 kg/m .  GENERAL: healthy, alert and no distress, frail appearing  EYES: Eyes grossly normal to inspection, PERRL and conjunctivae and sclerae normal  HENT: ear canals and TM's normal, nose and mouth without ulcers or " lesions  RESP: lungs clear to auscultation - no rales, rhonchi or wheezes  CV: regular rate and rhythm, normal S1 S2, no S3 or S4, no murmur, click or rub, no peripheral edema and peripheral pulses strong  PSYCH: mentation appears normal, affect normal/bright    Diagnostic Test Results:  Results for orders placed or performed in visit on 03/15/19   Magnesium   Result Value Ref Range    Magnesium 2.3 1.6 - 2.3 mg/dL         ASSESSMENT/PLAN:   Bee was seen today for hospital f/u.    Diagnoses and all orders for this visit:    Generalized muscle weakness  Resume/reinstate Home care nursing    Paroxysmal atrial fibrillation (H), newly diagnosed  -     Refill: metoprolol tartrate (LOPRESSOR) 50 MG tablet; Take 0.5 tablets (25 mg) by mouth 2 times daily  -     EKG 12-lead complete w/read - Clinics  -     CARDIOLOGY EVAL ADULT REFERRAL  -     Continue: aspirin (ASA) 81 MG EC tablet; Take 1 tablet (81 mg) by mouth daily    Hypomagnesemia, on suplementation  -     Magnesium    Influenza A, improving  Unable to complete Tamiflu- side effects (vomiting)    Follow up in month, sooner if needed.       Rosemary Rahman MD  [facility]

## 2019-03-28 ENCOUNTER — OFFICE VISIT (OUTPATIENT)
Dept: CARDIOLOGY | Facility: CLINIC | Age: 79
End: 2019-03-28
Attending: FAMILY MEDICINE
Payer: COMMERCIAL

## 2019-03-28 VITALS
WEIGHT: 173 LBS | DIASTOLIC BLOOD PRESSURE: 82 MMHG | SYSTOLIC BLOOD PRESSURE: 131 MMHG | OXYGEN SATURATION: 98 % | BODY MASS INDEX: 29.01 KG/M2 | HEART RATE: 64 BPM

## 2019-03-28 DIAGNOSIS — I48.91 ATRIAL FIBRILLATION, UNSPECIFIED TYPE (H): Primary | ICD-10-CM

## 2019-03-28 PROCEDURE — 99204 OFFICE O/P NEW MOD 45 MIN: CPT | Performed by: INTERNAL MEDICINE

## 2019-03-28 NOTE — LETTER
3/28/2019      RE: Bee Yepez  [address]   [place] [address]       Dear Colleague,    Thank you for the opportunity to participate in the care of your patient, Bee Yepez, at the [facility] HEART AT [facility]. Please see a copy of my visit note below.       SUBJECTIVE:  Bee Yepze is a 78 year old female who presents for evaluation of atrial fibrillation.  Patient was admitted to [facility] on 3/10/2019 with influenza A.  She had some cognitive impairment.  Also had one episode of atrial fibrillation with rate of over 130 bpm.  This spontaneously reverted to normal sinus rhythm.  Since then patient remained asymptomatic.  No prior history of cardiac arrhythmia.  No prior cardiac history.  Patient is fairly active with no cardiac symptoms.  Heart 2 falls in the last year.  No excess coffee or alcohol.  No significant cardiac risk factors.  Only cardiac medications are aspirin 81 mg and metoprolol 25 mg twice a day.  Since discharge patient remained completely asymptomatic.  Only complaint is tiredness.      Patient Active Problem List    Diagnosis Date Noted     Osteopenia, unspecified location 08/11/2017     Priority: Medium     Advanced directives, counseling/discussion 11/30/2015     Priority: Medium     Advance Care Planning 11/30/2015: ACP Review and Resources Provided:  Reviewed chart for advance care plan.  Bee Yepez has no plan or code status on file. Discussed available resources and provided with information.  Added by Kavya Alonso           Trigeminal neuralgia 11/30/2015     Priority: Medium     Family history of breast cancer 10/21/2015     Priority: Medium     Family history of colon cancer 10/21/2015     Priority: Medium    .  Current Outpatient Medications   Medication Sig     aspirin (ASA) 81 MG EC tablet Take 1 tablet (81 mg) by mouth daily     calcium carbonate-vitamin D 600-400  MG-UNIT CHEW Take 1 chew tab by mouth 2 times daily     carBAMazepine (TEGRETOL XR) 200 MG 12 hr tablet TAKE ONE TABLET BY MOUTH TWICE DAILY     latanoprost (XALATAN) 0.005 % ophthalmic solution Place 1 drop into both eyes At Bedtime     metoprolol tartrate (LOPRESSOR) 50 MG tablet Take 0.5 tablets (25 mg) by mouth 2 times daily     No current facility-administered medications for this visit.      Past Medical History:   Diagnosis Date     Ex-smoker     3 PPD for 25 years ago, quit in 1985.     Family history of breast cancer     sister at age 45     Family history of colon cancer     sister in her 60s     Osteopenia     per patient report.     Past Surgical History:   Procedure Laterality Date     CHOLECYSTECTOMY      in the 1970s     TUBAL LIGATION       No Known Allergies  Social History     Socioeconomic History     Marital status:      Spouse name: Not on file     Number of children: Not on file     Years of education: Not on file     Highest education level: Not on file   Occupational History     Not on file   Social Needs     Financial resource strain: Not on file     Food insecurity:     Worry: Not on file     Inability: Not on file     Transportation needs:     Medical: Not on file     Non-medical: Not on file   Tobacco Use     Smoking status: Former Smoker     Smokeless tobacco: Never Used   Substance and Sexual Activity     Alcohol use: No     Alcohol/week: 0.0 oz     Drug use: No     Sexual activity: No   Lifestyle     Physical activity:     Days per week: Not on file     Minutes per session: Not on file     Stress: Not on file   Relationships     Social connections:     Talks on phone: Not on file     Gets together: Not on file     Attends Jain service: Not on file     Active member of club or organization: Not on file     Attends meetings of clubs or organizations: Not on file     Relationship status: Not on file     Intimate partner violence:     Fear of current or ex partner: Not on file      Emotionally abused: Not on file     Physically abused: Not on file     Forced sexual activity: Not on file   Other Topics Concern     Parent/sibling w/ CABG, MI or angioplasty before 65F 55M? Not Asked   Social History Narrative     Not on file     Family History   Problem Relation Age of Onset     Coronary Artery Disease Father         had a pacemaker     Breast Cancer Sister          at age 45     Colon Cancer Sister         Dx in her 60s     Lung Cancer Sister         Dx in her 60s     Diabetes No family hx of           REVIEW OF SYSTEMS:  General: negative, fever, chills, night sweats  Skin: negative, acne, rash and scaling  Eyes: negative, double vision, eye pain and photophobia  Ears/Nose/Throat: negative, nasal congestion and purulent rhinorrhea  Respiratory: No dyspnea on exertion, No cough, No hemoptysis and negative  Cardiovascular: negative, palpitations, tachycardia, irregular heart beat, chest pain, exertional chest pain or pressure, paroxysmal nocturnal dyspnea, dyspnea on exertion and orthopnea  Gastrointestinal: negative, dysphagia, nausea and vomiting  Genitourinary: negative, nocturia, dysuria and frequency  Musculoskeletal: negative, fracture, back pain and neck pain  Neurologic: negative, headaches, syncope and stroke  Psychiatric: negative, nervous breakdown, thoughts of self-harm and thoughts of hurting someone else  Hematologic/Lymphatic/Immunologic: negative, bleeding disorder, chills and fever  Endocrine: negative, cold intolerance, heat intolerance and hot flashes       OBJECTIVE:  Blood pressure 131/82, pulse 64, weight 78.5 kg (173 lb), SpO2 98 %, not currently breastfeeding.  General Appearance: healthy, alert, active and no distress  Head: Normocephalic. No masses, lesions, tenderness or abnormalities  Eyes: conjuctiva clear, PERRL, EOM intact  Ears: External ears normal. Canals clear. TM's normal.  Nose: Nares normal  Mouth: normal  Neck: Supple, no cervical adenopathy, no  thyromegaly  Lungs: clear to auscultation  Cardiac: regular rate and rhythm, normal S1 and S2, no murmur  Abdomen: Soft, nontender.  Normal bowel sounds.  No hepatosplenomegaly or abnormal masses  Extremities: no peripheral edema, peripheral pulses normal  Musculoskeletal: negative  Neurological: Cranial nerves 2-12 intact, motor strength intact       ASSESSMENT/PLAN:  Patient here for evaluation of an isolated episode of atrial fibrillation.  Atrial fibrillation occurred in the context of an admission with influenza A.  This was a brief episode and reverted to sinus rhythm spontaneously.  Since this episode patient is totally asymptomatic.  No prior cardiac history.  No history of atrial fibrillation or any other significant arrhythmia.  Patient do not have significant CAD risk factors.  No excess alcohol or coffee.  Reviewed records from [facility].  One EKG showing atrial fibrillation with rate of 133 bpm.  Rest of the EKG is sinus rhythm.  Reviewed echo report from [facility].  Normal cardiac function no significant valvular abnormality.  Normal left atrial size.  MRI result reviewed.  Mild generalized atrophy.  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  No acute intracranial process or bleed.    Most likely atrial fibrillation related to influenza A.  Since this was an isolated episode in relation to have pulmonary infection may not start patient on anticoagulation at this time.  Her cardiac function is normal no valvular heart disease and left atrial size is normal so probably her chance of atrial fibrillation recurrence is low.  Patient is advised that if there is any recurrence of atrial fibrillation in future she will be a candidate for anticoagulation due to age and female sex.  She will continue the metoprolol and aspirin as she is taking now.  Given the option of coming back in 6 months but patient states she will go to the primary care and get evaluated there.  Per orders.    Return to Clinic PRN.    Please do not hesitate to contact me if you have any questions/concerns.     Sincerely,     EDUARDO Hill MD

## 2019-03-28 NOTE — PATIENT INSTRUCTIONS
Thank you for coming to the [facility] @ [place] [place]; please note the following instructions:    1. No changes today.    2. Follow up as needed.         If you have any questions regarding your visit please contact your care team:     Cardiology  Telephone Number   Deb GRIFFITHS, DENISSE MONTESINOS, DENISSE SOLORIO, JAZZY BRADY MA   [phone]    *After hours: [phone]   For scheduling appts:     [phone] or    [phone] (select option 1)    *After hours: [phone]     For the Device Clinic (Pacemakers and ICD's)  RN's :  Shanthi Henley   During business hours: [phone]    *After business hours:  [phone] (select option 4)      Normal test result notifications will be released via [employer] or mailed within 7 business days.  All other test results, will be communicated via telephone once reviewed by your cardiologist.    If you need a medication refill please contact your pharmacy.  Please allow 3 business days for your refill to be completed.    As always, thank you for trusting us with your health care needs!

## 2019-03-28 NOTE — PROGRESS NOTES
SUBJECTIVE:  Bee Yepez is a 78 year old female who presents for evaluation of atrial fibrillation.  Patient was admitted to [facility] on 3/10/2019 with influenza A.  She had some cognitive impairment.  Also had one episode of atrial fibrillation with rate of over 130 bpm.  This spontaneously reverted to normal sinus rhythm.  Since then patient remained asymptomatic.  No prior history of cardiac arrhythmia.  No prior cardiac history.  Patient is fairly active with no cardiac symptoms.  Heart 2 falls in the last year.  No excess coffee or alcohol.  No significant cardiac risk factors.  Only cardiac medications are aspirin 81 mg and metoprolol 25 mg twice a day.  Since discharge patient remained completely asymptomatic.  Only complaint is tiredness.      Patient Active Problem List    Diagnosis Date Noted     Osteopenia, unspecified location 08/11/2017     Priority: Medium     Advanced directives, counseling/discussion 11/30/2015     Priority: Medium     Advance Care Planning 11/30/2015: ACP Review and Resources Provided:  Reviewed chart for advance care plan.  Bee Yepez has no plan or code status on file. Discussed available resources and provided with information.  Added by Kavya Alonso           Trigeminal neuralgia 11/30/2015     Priority: Medium     Family history of breast cancer 10/21/2015     Priority: Medium     Family history of colon cancer 10/21/2015     Priority: Medium    .  Current Outpatient Medications   Medication Sig     aspirin (ASA) 81 MG EC tablet Take 1 tablet (81 mg) by mouth daily     calcium carbonate-vitamin D 600-400 MG-UNIT CHEW Take 1 chew tab by mouth 2 times daily     carBAMazepine (TEGRETOL XR) 200 MG 12 hr tablet TAKE ONE TABLET BY MOUTH TWICE DAILY     latanoprost (XALATAN) 0.005 % ophthalmic solution Place 1 drop into both eyes At Bedtime     metoprolol tartrate (LOPRESSOR) 50 MG tablet Take 0.5 tablets (25 mg) by mouth 2 times daily     No current  facility-administered medications for this visit.      Past Medical History:   Diagnosis Date     Ex-smoker     3 PPD for 25 years ago, quit in 1985.     Family history of breast cancer     sister at age 45     Family history of colon cancer     sister in her 60s     Osteopenia     per patient report.     Past Surgical History:   Procedure Laterality Date     CHOLECYSTECTOMY      in the 1970s     TUBAL LIGATION       No Known Allergies  Social History     Socioeconomic History     Marital status:      Spouse name: Not on file     Number of children: Not on file     Years of education: Not on file     Highest education level: Not on file   Occupational History     Not on file   Social Needs     Financial resource strain: Not on file     Food insecurity:     Worry: Not on file     Inability: Not on file     Transportation needs:     Medical: Not on file     Non-medical: Not on file   Tobacco Use     Smoking status: Former Smoker     Smokeless tobacco: Never Used   Substance and Sexual Activity     Alcohol use: No     Alcohol/week: 0.0 oz     Drug use: No     Sexual activity: No   Lifestyle     Physical activity:     Days per week: Not on file     Minutes per session: Not on file     Stress: Not on file   Relationships     Social connections:     Talks on phone: Not on file     Gets together: Not on file     Attends Muslim service: Not on file     Active member of club or organization: Not on file     Attends meetings of clubs or organizations: Not on file     Relationship status: Not on file     Intimate partner violence:     Fear of current or ex partner: Not on file     Emotionally abused: Not on file     Physically abused: Not on file     Forced sexual activity: Not on file   Other Topics Concern     Parent/sibling w/ CABG, MI or angioplasty before 65F 55M? Not Asked   Social History Narrative     Not on file     Family History   Problem Relation Age of Onset     Coronary Artery Disease Father         had  a pacemaker     Breast Cancer Sister          at age 45     Colon Cancer Sister         Dx in her 60s     Lung Cancer Sister         Dx in her 60s     Diabetes No family hx of           REVIEW OF SYSTEMS:  General: negative, fever, chills, night sweats  Skin: negative, acne, rash and scaling  Eyes: negative, double vision, eye pain and photophobia  Ears/Nose/Throat: negative, nasal congestion and purulent rhinorrhea  Respiratory: No dyspnea on exertion, No cough, No hemoptysis and negative  Cardiovascular: negative, palpitations, tachycardia, irregular heart beat, chest pain, exertional chest pain or pressure, paroxysmal nocturnal dyspnea, dyspnea on exertion and orthopnea  Gastrointestinal: negative, dysphagia, nausea and vomiting  Genitourinary: negative, nocturia, dysuria and frequency  Musculoskeletal: negative, fracture, back pain and neck pain  Neurologic: negative, headaches, syncope and stroke  Psychiatric: negative, nervous breakdown, thoughts of self-harm and thoughts of hurting someone else  Hematologic/Lymphatic/Immunologic: negative, bleeding disorder, chills and fever  Endocrine: negative, cold intolerance, heat intolerance and hot flashes       OBJECTIVE:  Blood pressure 131/82, pulse 64, weight 78.5 kg (173 lb), SpO2 98 %, not currently breastfeeding.  General Appearance: healthy, alert, active and no distress  Head: Normocephalic. No masses, lesions, tenderness or abnormalities  Eyes: conjuctiva clear, PERRL, EOM intact  Ears: External ears normal. Canals clear. TM's normal.  Nose: Nares normal  Mouth: normal  Neck: Supple, no cervical adenopathy, no thyromegaly  Lungs: clear to auscultation  Cardiac: regular rate and rhythm, normal S1 and S2, no murmur  Abdomen: Soft, nontender.  Normal bowel sounds.  No hepatosplenomegaly or abnormal masses  Extremities: no peripheral edema, peripheral pulses normal  Musculoskeletal: negative  Neurological: Cranial nerves 2-12 intact, motor strength intact        ASSESSMENT/PLAN:  Patient here for evaluation of an isolated episode of atrial fibrillation.  Atrial fibrillation occurred in the context of an admission with influenza A.  This was a brief episode and reverted to sinus rhythm spontaneously.  Since this episode patient is totally asymptomatic.  No prior cardiac history.  No history of atrial fibrillation or any other significant arrhythmia.  Patient do not have significant CAD risk factors.  No excess alcohol or coffee.  Reviewed records from [facility].  One EKG showing atrial fibrillation with rate of 133 bpm.  Rest of the EKG is sinus rhythm.  Reviewed echo report from [facility].  Normal cardiac function no significant valvular abnormality.  Normal left atrial size.  MRI result reviewed.  Mild generalized atrophy.  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  No acute intracranial process or bleed.    Most likely atrial fibrillation related to influenza A.  Since this was an isolated episode in relation to have pulmonary infection may not start patient on anticoagulation at this time.  Her cardiac function is normal no valvular heart disease and left atrial size is normal so probably her chance of atrial fibrillation recurrence is low.  Patient is advised that if there is any recurrence of atrial fibrillation in future she will be a candidate for anticoagulation due to age and female sex.  She will continue the metoprolol and aspirin as she is taking now.  Given the option of coming back in 6 months but patient states she will go to the primary care and get evaluated there.  Per orders.   Return to Clinic PRN.

## 2019-03-28 NOTE — NURSING NOTE
"Chief Complaint   Patient presents with     Atrial Fib     NEW: reason for visit: KP, new afib. Pt reports SOB with exertion.       Initial /82 (BP Location: Left arm, Patient Position: Chair, Cuff Size: Adult Large)   Pulse 64   Wt 78.5 kg (173 lb)   SpO2 98%   BMI 29.01 kg/m   Estimated body mass index is 29.01 kg/m  as calculated from the following:    Height as of 3/15/19: 1.645 m (5' 4.75\").    Weight as of this encounter: 78.5 kg (173 lb)..  BP completed using cuff size: large    Rebecca Dominguez MA    "

## 2019-04-09 DIAGNOSIS — Z53.9 DIAGNOSIS NOT YET DEFINED: Primary | ICD-10-CM

## 2019-04-09 PROCEDURE — G0180 MD CERTIFICATION HHA PATIENT: HCPCS | Performed by: FAMILY MEDICINE

## 2019-04-10 DIAGNOSIS — Z91.81 HISTORY OF FALLING: ICD-10-CM

## 2019-04-10 DIAGNOSIS — Z60.2 PROBLEM RELATED TO LIVING ALONE: ICD-10-CM

## 2019-04-10 DIAGNOSIS — M85.80 OTHER SPECIFIED DISORDERS OF BONE DENSITY AND STRUCTURE, UNSPECIFIED SITE: ICD-10-CM

## 2019-04-10 DIAGNOSIS — I48.0 PAROXYSMAL ATRIAL FIBRILLATION (H): ICD-10-CM

## 2019-04-10 DIAGNOSIS — G50.0 TRIGEMINAL NEURALGIA: ICD-10-CM

## 2019-04-10 DIAGNOSIS — J10.1 FLU DUE TO OTH IDENT INFLUENZA VIRUS W OTH RESP MANIFEST: Primary | ICD-10-CM

## 2019-04-10 DIAGNOSIS — M62.81 MUSCLE WEAKNESS (GENERALIZED): ICD-10-CM

## 2019-04-10 DIAGNOSIS — Z87.891 PERSONAL HISTORY OF NICOTINE DEPENDENCE: ICD-10-CM

## 2019-04-10 SDOH — SOCIAL STABILITY - SOCIAL INSECURITY: PROBLEMS RELATED TO LIVING ALONE: Z60.2

## 2020-01-24 ENCOUNTER — TRANSFERRED RECORDS (OUTPATIENT)
Dept: HEALTH INFORMATION MANAGEMENT | Facility: CLINIC | Age: 80
End: 2020-01-24

## 2020-02-24 ENCOUNTER — TELEPHONE (OUTPATIENT)
Dept: FAMILY MEDICINE | Facility: CLINIC | Age: 80
End: 2020-02-24

## 2020-02-24 NOTE — TELEPHONE ENCOUNTER
Spoke with patient, had questions on what a Dexa Scan was for. Questions answered for patient. She scheduled appt for scan 3//11/20 at [place]

## 2020-03-04 ENCOUNTER — TRANSFERRED RECORDS (OUTPATIENT)
Dept: HEALTH INFORMATION MANAGEMENT | Facility: CLINIC | Age: 80
End: 2020-03-04

## 2020-03-11 ENCOUNTER — ANCILLARY PROCEDURE (OUTPATIENT)
Dept: BONE DENSITY | Facility: CLINIC | Age: 80
End: 2020-03-11
Attending: FAMILY MEDICINE
Payer: COMMERCIAL

## 2020-03-11 DIAGNOSIS — Z78.0 POSTMENOPAUSAL ESTROGEN DEFICIENCY: ICD-10-CM

## 2020-03-11 PROCEDURE — 77085 DXA BONE DENSITY AXL VRT FX: CPT | Performed by: INTERNAL MEDICINE

## 2020-03-13 ENCOUNTER — TELEPHONE (OUTPATIENT)
Dept: FAMILY MEDICINE | Facility: CLINIC | Age: 80
End: 2020-03-13

## 2020-03-13 NOTE — TELEPHONE ENCOUNTER
Patient had diarrhea and vomiting during the night. Does she need to be seen? She has been keeping some water down.

## 2020-03-13 NOTE — TELEPHONE ENCOUNTER
Bee states that she vomited 3 times during the night and had diarrhea.     Since then, she has had no further vomiting or diarrhea. She is drinking water and eating oatmeal.     No fever.     She does complain of some body aches and plans to take Tylenol as needed.     She voided a good amount this morning.     Bee plans to stick with bland foods today and will drink water or gatorade. She will monitor her urine output and symptoms. She will continue to rest and call back with any other concerns.      If things are worse over the weekend, she is instructed to go to Urgent Care.     Patient verbalized a good understanding and in agreement with this plan.     Shanthi Abdi RN BSN

## 2020-06-09 ENCOUNTER — OFFICE VISIT (OUTPATIENT)
Dept: FAMILY MEDICINE | Facility: CLINIC | Age: 80
End: 2020-06-09
Payer: COMMERCIAL

## 2020-06-09 VITALS
OXYGEN SATURATION: 94 % | TEMPERATURE: 97.6 F | BODY MASS INDEX: 31.19 KG/M2 | HEART RATE: 80 BPM | WEIGHT: 186 LBS | DIASTOLIC BLOOD PRESSURE: 82 MMHG | SYSTOLIC BLOOD PRESSURE: 136 MMHG | RESPIRATION RATE: 22 BRPM

## 2020-06-09 DIAGNOSIS — R26.81 UNSTEADY GAIT: Primary | ICD-10-CM

## 2020-06-09 DIAGNOSIS — N64.4 BREAST PAIN, RIGHT: ICD-10-CM

## 2020-06-09 DIAGNOSIS — R35.0 URINARY FREQUENCY: ICD-10-CM

## 2020-06-09 DIAGNOSIS — R27.8 OTHER LACK OF COORDINATION: ICD-10-CM

## 2020-06-09 LAB
ALBUMIN SERPL-MCNC: 3.5 G/DL (ref 3.4–5)
ALBUMIN UR-MCNC: NEGATIVE MG/DL
ALP SERPL-CCNC: 91 U/L (ref 40–150)
ALT SERPL W P-5'-P-CCNC: 23 U/L (ref 0–50)
ANION GAP SERPL CALCULATED.3IONS-SCNC: 8 MMOL/L (ref 3–14)
APPEARANCE UR: ABNORMAL
AST SERPL W P-5'-P-CCNC: 15 U/L (ref 0–45)
BILIRUB SERPL-MCNC: 0.2 MG/DL (ref 0.2–1.3)
BILIRUB UR QL STRIP: NEGATIVE
BUN SERPL-MCNC: 14 MG/DL (ref 7–30)
CALCIUM SERPL-MCNC: 8.8 MG/DL (ref 8.5–10.1)
CHLORIDE SERPL-SCNC: 101 MMOL/L (ref 94–109)
CO2 SERPL-SCNC: 26 MMOL/L (ref 20–32)
COLOR UR AUTO: YELLOW
CREAT SERPL-MCNC: 0.87 MG/DL (ref 0.52–1.04)
ERYTHROCYTE [DISTWIDTH] IN BLOOD BY AUTOMATED COUNT: 12 % (ref 10–15)
FOLATE SERPL-MCNC: 10.4 NG/ML
GFR SERPL CREATININE-BSD FRML MDRD: 62 ML/MIN/{1.73_M2}
GLUCOSE SERPL-MCNC: 96 MG/DL (ref 70–99)
GLUCOSE UR STRIP-MCNC: NEGATIVE MG/DL
HCT VFR BLD AUTO: 38.2 % (ref 35–47)
HGB BLD-MCNC: 12.8 G/DL (ref 11.7–15.7)
HGB UR QL STRIP: NEGATIVE
KETONES UR STRIP-MCNC: NEGATIVE MG/DL
LEUKOCYTE ESTERASE UR QL STRIP: ABNORMAL
MCH RBC QN AUTO: 33.1 PG (ref 26.5–33)
MCHC RBC AUTO-ENTMCNC: 33.5 G/DL (ref 31.5–36.5)
MCV RBC AUTO: 99 FL (ref 78–100)
NITRATE UR QL: NEGATIVE
NON-SQ EPI CELLS #/AREA URNS LPF: ABNORMAL /LPF
PH UR STRIP: 5 PH (ref 5–7)
PLATELET # BLD AUTO: 262 10E9/L (ref 150–450)
POTASSIUM SERPL-SCNC: 4.4 MMOL/L (ref 3.4–5.3)
PROT SERPL-MCNC: 7.4 G/DL (ref 6.8–8.8)
RBC # BLD AUTO: 3.87 10E12/L (ref 3.8–5.2)
RBC #/AREA URNS AUTO: ABNORMAL /HPF
SODIUM SERPL-SCNC: 135 MMOL/L (ref 133–144)
SOURCE: ABNORMAL
SP GR UR STRIP: 1.02 (ref 1–1.03)
TSH SERPL DL<=0.005 MIU/L-ACNC: 3.37 MU/L (ref 0.4–4)
UROBILINOGEN UR STRIP-ACNC: 0.2 EU/DL (ref 0.2–1)
VIT B12 SERPL-MCNC: 354 PG/ML (ref 193–986)
WBC # BLD AUTO: 9 10E9/L (ref 4–11)
WBC #/AREA URNS AUTO: ABNORMAL /HPF

## 2020-06-09 PROCEDURE — 85027 COMPLETE CBC AUTOMATED: CPT | Performed by: FAMILY MEDICINE

## 2020-06-09 PROCEDURE — 84443 ASSAY THYROID STIM HORMONE: CPT | Performed by: FAMILY MEDICINE

## 2020-06-09 PROCEDURE — 82746 ASSAY OF FOLIC ACID SERUM: CPT | Performed by: FAMILY MEDICINE

## 2020-06-09 PROCEDURE — 99214 OFFICE O/P EST MOD 30 MIN: CPT | Performed by: FAMILY MEDICINE

## 2020-06-09 PROCEDURE — 82607 VITAMIN B-12: CPT | Performed by: FAMILY MEDICINE

## 2020-06-09 PROCEDURE — 81001 URINALYSIS AUTO W/SCOPE: CPT | Performed by: FAMILY MEDICINE

## 2020-06-09 PROCEDURE — 36415 COLL VENOUS BLD VENIPUNCTURE: CPT | Performed by: FAMILY MEDICINE

## 2020-06-09 PROCEDURE — 80053 COMPREHEN METABOLIC PANEL: CPT | Performed by: FAMILY MEDICINE

## 2020-06-09 NOTE — LETTER
June 11, 2020      Bee Yepez  [address] [place], [place] [address] [place] [address]        Dear ,    We are writing to inform you of your test results.    Here are the blood test results we discussed over the phone.   Resulted Orders   CBC with platelets   Result Value Ref Range    WBC 9.0 4.0 - 11.0 10e9/L    RBC Count 3.87 3.8 - 5.2 10e12/L    Hemoglobin 12.8 11.7 - 15.7 g/dL    Hematocrit 38.2 35.0 - 47.0 %    MCV 99 78 - 100 fl    MCH 33.1 (H) 26.5 - 33.0 pg    MCHC 33.5 31.5 - 36.5 g/dL    RDW 12.0 10.0 - 15.0 %    Platelet Count 262 150 - 450 10e9/L   Comprehensive metabolic panel   Result Value Ref Range    Sodium 135 133 - 144 mmol/L    Potassium 4.4 3.4 - 5.3 mmol/L    Chloride 101 94 - 109 mmol/L    Carbon Dioxide 26 20 - 32 mmol/L    Anion Gap 8 3 - 14 mmol/L    Glucose 96 70 - 99 mg/dL    Urea Nitrogen 14 7 - 30 mg/dL    Creatinine 0.87 0.52 - 1.04 mg/dL    GFR Estimate 62 >60 mL/min/[1.73_m2]      Comment:      Non  GFR Calc  Starting 12/18/2018, serum creatinine based estimated GFR (eGFR) will be   calculated using the Chronic Kidney Disease Epidemiology Collaboration   (CKD-EPI) equation.      GFR Estimate If Black 72 >60 mL/min/[1.73_m2]      Comment:       GFR Calc  Starting 12/18/2018, serum creatinine based estimated GFR (eGFR) will be   calculated using the Chronic Kidney Disease Epidemiology Collaboration   (CKD-EPI) equation.      Calcium 8.8 8.5 - 10.1 mg/dL    Bilirubin Total 0.2 0.2 - 1.3 mg/dL    Albumin 3.5 3.4 - 5.0 g/dL    Protein Total 7.4 6.8 - 8.8 g/dL    Alkaline Phosphatase 91 40 - 150 U/L    ALT 23 0 - 50 U/L    AST 15 0 - 45 U/L   TSH with free T4 reflex   Result Value Ref Range    TSH 3.37 0.40 - 4.00 mU/L   Vitamin B12   Result Value Ref Range    Vitamin B12 354 193 - 986 pg/mL   Folate   Result Value Ref Range    Folate 10.4 >5.4 ng/mL       If you have any questions or concerns, please call the clinic at the number listed above.        Sincerely,        Rosemary Rahman MD/juhio

## 2020-06-09 NOTE — PROGRESS NOTES
Subjective     Bee Yepez is a 79 year old female who presents to clinic today for the following health issues:    HPI         Unsteady/ Off Balance  States she has noticed herself tripping/ falling more often.   Will lose her balance quickly and tip over.   Mentioned this concern to her neurologist who is managing her Trigeminal Neuralgia and had MRI done 3/4/20 - This revealed no acute abnormalities or suspicious masses.   Recently started using help alert in case of emergency, is hoping her insurance will cover some of the costs of this.   Requesting application for handicap parking due to the unsteady gait and recent history of frequent falls. .     Breast Pain- Right,  Intermittent  Located on right side of right breast  Pain will come and go over the past year.   Sister was recently dx with breast cancer.       URINARY FREQUENCY  Onset: x 2 months     Description:   Painful urination (Dysuria): no            Frequency: YES- mostly at night   Blood in urine (Hematuria): no   Delay in urine (Hesitency): sometimes     Intensity: moderate    Progression of Symptoms:  Intermittent     Accompanying Signs & Symptoms:  Fever/chills: no   Flank pain YES- possible   Nausea and vomiting:  none  Any vaginal symptoms: vaginal itching  Abdominal/Pelvic Pain: no     History:   History of frequent UTI's: no   History of kidney stones: no   Sexually Active: no   Possibility of pregnancy: No    Precipitating factors:   none    Therapies Tried and outcome: none        Patient Active Problem List   Diagnosis     Family history of breast cancer     Family history of colon cancer     Advanced directives, counseling/discussion     Trigeminal neuralgia     Osteopenia, unspecified location     Past Surgical History:   Procedure Laterality Date     CHOLECYSTECTOMY      in the 1970s     TUBAL LIGATION         Social History     Tobacco Use     Smoking status: Former Smoker     Smokeless tobacco: Never Used   Substance Use Topics      Alcohol use: No     Alcohol/week: 0.0 standard drinks     Family History   Problem Relation Age of Onset     Coronary Artery Disease Father         had a pacemaker     Breast Cancer Sister          at age 45     Colon Cancer Sister         Dx in her 60s     Lung Cancer Sister         Dx in her 60s     Diabetes No family hx of          Current Outpatient Medications   Medication Sig Dispense Refill     calcium carbonate-vitamin D 600-400 MG-UNIT CHEW Take 1 chew tab by mouth 2 times daily       carBAMazepine (TEGRETOL XR) 200 MG 12 hr tablet TAKE ONE TABLET BY MOUTH TWICE DAILY 60 tablet 11     latanoprost (XALATAN) 0.005 % ophthalmic solution Place 1 drop into both eyes At Bedtime 2.5 mL 0     aspirin (ASA) 81 MG EC tablet Take 1 tablet (81 mg) by mouth daily       metoprolol tartrate (LOPRESSOR) 50 MG tablet Take 0.5 tablets (25 mg) by mouth 2 times daily 90 tablet 3     No Known Allergies      Reviewed and updated as needed this visit by Provider         Review of Systems   Constitutional, HEENT, cardiovascular, pulmonary, gi and gu systems are negative, except as otherwise noted.      Objective    /82   Pulse 80   Temp 97.6  F (36.4  C) (Tympanic)   Resp 22   Wt 84.4 kg (186 lb)   SpO2 94%   Breastfeeding No   BMI 31.19 kg/m    Body mass index is 31.19 kg/m .  Physical Exam   GENERAL: healthy, alert and no distress  EYES: Eyes grossly normal to inspection, PERRL and conjunctivae and sclerae normal  HENT: ear canals and TM's normal, nose and mouth without ulcers or lesions  BREAST: normal without masses, generalized right breast tenderness. No focal tenderness. No nipple discharge and no palpable axillary masses or adenopathy  RESP: lungs clear to auscultation - no rales, rhonchi or wheezes  CV: regular rate and rhythm, normal S1 S2, no S3 or S4, no murmur, click or rub, no peripheral edema and peripheral pulses strong  MS: no gross musculoskeletal defects noted, no edema  NEURO: Normal strength  and tone, mentation intact and speech normal    Diagnostic Test Results:  Labs reviewed in Epic      Component      Latest Ref Rng & Units 6/9/2020   Sodium      133 - 144 mmol/L 135   Potassium      3.4 - 5.3 mmol/L 4.4   Chloride      94 - 109 mmol/L 101   Carbon Dioxide      20 - 32 mmol/L 26   Anion Gap      3 - 14 mmol/L 8   Glucose      70 - 99 mg/dL 96   Urea Nitrogen      7 - 30 mg/dL 14   Creatinine      0.52 - 1.04 mg/dL 0.87   GFR Estimate      >60 mL/min/1.73:m2 62   GFR Estimate If Black      >60 mL/min/1.73:m2 72   Calcium      8.5 - 10.1 mg/dL 8.8   Bilirubin Total      0.2 - 1.3 mg/dL 0.2   Albumin      3.4 - 5.0 g/dL 3.5   Protein Total      6.8 - 8.8 g/dL 7.4   Alkaline Phosphatase      40 - 150 U/L 91   ALT      0 - 50 U/L 23   AST      0 - 45 U/L 15   Color Urine       Yellow   Appearance Urine       Slightly Cloudy   Glucose Urine      NEG:Negative mg/dL Negative   Bilirubin Urine      NEG:Negative Negative   Ketones Urine      NEG:Negative mg/dL Negative   Specific Gravity Urine      1.003 - 1.035 1.020   Blood Urine      NEG:Negative Negative   pH Urine      5.0 - 7.0 pH 5.0   Protein Albumin Urine      NEG:Negative mg/dL Negative   Urobilinogen Urine      0.2 - 1.0 EU/dL 0.2   Nitrite Urine      NEG:Negative Negative   Leukocyte Esterase Urine      NEG:Negative Trace (A)   Source       Midstream Urine   WBC      4.0 - 11.0 10e9/L 9.0   RBC Count      3.8 - 5.2 10e12/L 3.87   Hemoglobin      11.7 - 15.7 g/dL 12.8   Hematocrit      35.0 - 47.0 % 38.2   MCV      78 - 100 fl 99   MCH      26.5 - 33.0 pg 33.1 (H)   MCHC      31.5 - 36.5 g/dL 33.5   RDW      10.0 - 15.0 % 12.0   Platelet Count      150 - 450 10e9/L 262   WBC Urine      OTO5:0 - 5 /HPF 0 - 5   RBC Urine      OTO2:O - 2 /HPF O - 2   Squamous Epithelial /LPF Urine      FEW:Few /LPF Moderate (A)   TSH      0.40 - 4.00 mU/L 3.37   Vitamin B12      193 - 986 pg/mL 354   Folate      >5.4 ng/mL 10.4       Assessment & Plan     Bee was  seen today for fall, breast pain, uti and edema.    Diagnoses and all orders for this visit:    Unsteady gait/ Other lack of coordination - at risk for falls  -     CBC with platelets  -     Comprehensive metabolic panel  -     TSH with free T4 reflex  -     Vitamin B12  -     Folate  -     MRI Brain recently completed on 3/4/2020 (outside facility- [facility])- results unremarkable.   -     Encouraged use of an Ambulatory device like a Cane/Walker.  -    Disability Parking Permit Forms completed.     Urinary frequency, likely OAB but will rule out a UTI first.  -     UA reflex to Microscopic and Culture; Future    Breast pain, right   -     MA Diagnostic Bilateral w/Alexey; Future  -     US Breast Right; Future    Will notify patient with results.        Return in about 2 weeks (around 6/23/2020), or if symptoms worsen or fail to improve.    Rosemary Rahman MD  [facility]

## 2020-06-09 NOTE — PATIENT INSTRUCTIONS
Patient Education     Fall Prevention  Falls often occur due to slipping, tripping or losing your balance. Millions of people fall every year and injure themselves. Here are ways to reduce your risk of falling again.    Think about your fall, was there anything that caused your fall that can be fixed, removed, or replaced?    Make your home safe by keeping walkways clear of objects you may trip over, such as electric cords.    Use non-slip pads under rugs. Don't use area rugs or small throw rugs.    Use non-slip mats in bathtubs and showers.    Install handrails and lights on staircases. The handrails should be on both sides of the stairs.    Don't walk in poorly lit areas.    Don't stand on chairs or wobbly ladders.    Use caution when reaching overhead or looking upward. This position can cause a loss of balance.    Be sure your shoes fit properly, have non-slip bottoms and are in good condition.     Wear shoes both inside and out. Don't go barefoot or wear slippers.    Be cautious when going up and down stairs, curbs, and when walking on uneven sidewalks.    If your balance is poor, consider using a cane or walker.    If your fall was related to alcohol use, stop or limit alcohol intake.     If your fall was related to use of sleeping medicines, talk to your healthcare provider about this. You may need to reduce your dosage at bedtime if you awaken during the night to go to the bathroom.      To reduce the need for nighttime bathroom trips:  ? Don't drink fluids for several hours before going to bed  ? Empty your bladder before going to bed  ? Men can keep a urinal at the bedside    Stay as active as you can. Balance, flexibility, strength, and endurance all come from exercise. They all play a role in preventing falls. Ask your healthcare provider which types of activity are right for you.    Get your vision checked on a regular basis.    If you have pets, know where they are before you stand up or walk so you  don't trip over them.    Use night lights.    Go over all your medicines with a pharmacist or other healthcare provider to see if any of them could make you more likely to fall.  Date Last Reviewed: 4/1/2018 2000-2019 [employer]. [address] [place] [address], [place] [address]. All rights reserved. This information is not intended as a substitute for professional medical care. Always follow your healthcare professional's instructions.

## 2020-06-11 ENCOUNTER — ANCILLARY PROCEDURE (OUTPATIENT)
Dept: ULTRASOUND IMAGING | Facility: CLINIC | Age: 80
End: 2020-06-11
Attending: FAMILY MEDICINE
Payer: COMMERCIAL

## 2020-06-11 ENCOUNTER — ANCILLARY PROCEDURE (OUTPATIENT)
Dept: MAMMOGRAPHY | Facility: CLINIC | Age: 80
End: 2020-06-11
Attending: FAMILY MEDICINE
Payer: COMMERCIAL

## 2020-06-11 DIAGNOSIS — N64.4 BREAST PAIN, RIGHT: ICD-10-CM

## 2020-06-11 PROCEDURE — 76642 ULTRASOUND BREAST LIMITED: CPT | Mod: RT

## 2020-06-11 PROCEDURE — G0279 TOMOSYNTHESIS, MAMMO: HCPCS

## 2020-06-11 PROCEDURE — 77066 DX MAMMO INCL CAD BI: CPT

## 2020-10-12 ENCOUNTER — VIRTUAL VISIT (OUTPATIENT)
Dept: FAMILY MEDICINE | Facility: OTHER | Age: 80
End: 2020-10-12

## 2020-10-12 ENCOUNTER — TELEPHONE (OUTPATIENT)
Dept: FAMILY MEDICINE | Facility: CLINIC | Age: 80
End: 2020-10-12

## 2020-10-12 DIAGNOSIS — L29.2 VULVAR ITCHING: Primary | ICD-10-CM

## 2020-10-12 NOTE — TELEPHONE ENCOUNTER
Spoke with pt, she stated at her last appt with you on 6/9/20 she discussed having some itching in her private area. Not rashy, just itching. She was using cortisone and she said you had informed her you could order something stronger if needed. She is now requesting a stronger ointment for her itching. Pharmacy pended.

## 2020-10-12 NOTE — TELEPHONE ENCOUNTER
Reason for Call:  Other call back    Detailed comments: patient is calling to discuss issue from last visit for itching problem. Please call to discuss. Thank you.    Phone Number Patient can be reached at: Home number on file [phone] (home)    Best Time:     Can we leave a detailed message on this number? YES    Call taken on 10/12/2020 at 8:24 AM by Emma Díaz

## 2020-10-13 NOTE — PROGRESS NOTES
"Date: 10/12/2020 19:59:54  Clinician: Alexander Rose  Clinician NPI: [id number]  Patient: Bee Yepez  Patient : 1940  Patient Address: [address] [place] [address] [place] [address]  Patient Phone: [phone]  Visit Protocol: URI  Patient Summary:  Bee is a 79 year old ( : 1940 ) female who initiated a [facility] Visit for COVID-19 (Coronavirus) evaluation and screening. When asked the question \"Please sign me up to receive news, health information and promotions. \", Bee responded \"No\".    Bee states her symptoms started gradually 10-13 days ago. After her symptoms started, they improved and then got worse again.   Her symptoms consist of enlarged lymph nodes, a cough, rhinitis, malaise, and diarrhea.   Symptom details     Nasal secretions: The color of her mucus is clear.    Cough: Bee coughs a few times an hour and her cough is more bothersome at night. Phlegm does not come into her throat when she coughs. She believes her cough is caused by post-nasal drip.      Bee denies having ear pain, headache, wheezing, fever, nasal congestion, anosmia, vomiting, nausea, facial pain or pressure, myalgias, chills, sore throat, teeth pain, and ageusia. She also denies taking antibiotic medication in the past month and having recent facial or sinus surgery in the past 60 days. She is not experiencing dyspnea.   Precipitating events  She has not recently been exposed to someone with influenza. Bee has been in close contact with the following high risk individuals: adults 65 or older.   Pertinent COVID-19 (Coronavirus) information  In the past 14 days, Bee has not worked in a congregate living setting.   She does not work or volunteer as healthcare worker or a  and does not work or volunteer in a healthcare facility.   Bee also has not lived in a congregate living setting in the past 14 days. She does not live with a healthcare worker.   Bee has not had a close contact with " a laboratory-confirmed COVID-19 patient within 14 days of symptom onset.   Since December 2019, Bee and has not had upper respiratory infection or influenza-like illness. Has not been diagnosed with lab-confirmed COVID-19 test   Pertinent medical history  Bee does not get yeast infections when she takes antibiotics.   Bee does not need a return to work/school note.   Weight: 178 lbs   Bee does not smoke or use smokeless tobacco.   Weight: 178 lbs    MEDICATIONS: latanoprost ophthalmic (eye), carbamazepine oral, ALLERGIES: NKDA  Clinician Response:  Dear Bee,   Your symptoms show that you may have coronavirus (COVID-19). This illness can cause fever, cough and trouble breathing. Many people get a mild case and get better on their own. Some people can get very sick.  What should I do?  We would like to test you for this virus.   1. Please call [phone] to schedule your visit. Explain that you were referred by [facility] to have a COVID-19 test. Be ready to share your [facility] visit ID number.  The following will serve as your written order for this COVID Test, ordered by me, for the indication of suspected COVID [Z20.828]: The test will be ordered in [employer], our electronic health record, after you are scheduled. It will show as ordered and authorized by Jonel Tello MD.  Order: COVID-19 (Coronavirus) PCR for SYMPTOMATIC testing from [facility].      2. When it's time for your COVID test:  Stay at least 6 feet away from others. (If someone will drive you to your test, stay in the backseat, as far away from the  as you can.)   Cover your mouth and nose with a mask, tissue or washcloth.  Go straight to the testing site. Don't make any stops on the way there or back.      3.Starting now: Stay home and away from others (self-isolate) until:   You've had no fever---and no medicine that reduces fever---for one full day (24 hours). And...   Your other symptoms have gotten better. For example, your cough or  "breathing has improved. And...   At least 10 days have passed since your symptoms started.       During this time, don't leave the house except for testing or medical care.   Stay in your own room, even for meals. Use your own bathroom if you can.   Stay away from others in your home. No hugging, kissing or shaking hands. No visitors.  Don't go to work, school or anywhere else.    Clean \"high touch\" surfaces often (doorknobs, counters, handles, etc.). Use a household cleaning spray or wipes. You'll find a full list of  on the EPA website: www.epa.gov/pesticide-registration/list-n-disinfectants-use-against-sars-cov-2.   Cover your mouth and nose with a mask, tissue or washcloth to avoid spreading germs.  Wash your hands and face often. Use soap and water.  Caregivers in these groups are at risk for severe illness due to COVID-19:  o People 65 years and older  o People who live in a nursing home or long-term care facility  o People with chronic disease (lung, heart, cancer, diabetes, kidney, liver, immunologic)  o People who have a weakened immune system, including those who:   Are in cancer treatment  Take medicine that weakens the immune system, such as corticosteroids  Had a bone marrow or organ transplant  Have an immune deficiency  Have poorly controlled HIV or AIDS  Are obese (body mass index of 40 or higher)  Smoke regularly   o Caregivers should wear gloves while washing dishes, handling laundry and cleaning bedrooms and bathrooms.  o Use caution when washing and drying laundry: Don't shake dirty laundry, and use the warmest water setting that you can.  o For more tips, go to www.cdc.gov/coronavirus/2019-ncov/downloads/10Things.pdf.    4.Sign up for [employer]. We know it's scary to hear that you might have COVID-19. We want to track your symptoms to make sure you're okay over the next 2 weeks. Please look for an email from Manjinder Bartlett---this is a free, online program that we'll use to keep in " touch. To sign up, follow the link in the email. Learn more at [email or web address]  How can I take care of myself?   Get lots of rest. Drink extra fluids (unless a doctor has told you not to).   Take Tylenol (acetaminophen) for fever or pain. If you have liver or kidney problems, ask your family doctor if it's okay to take Tylenol.   Adults can take either:    650 mg (two 325 mg pills) every 4 to 6 hours, or...   1,000 mg (two 500 mg pills) every 8 hours as needed.    Note: Don't take more than 3,000 mg in one day. Acetaminophen is found in many medicines (both prescribed and over-the-counter medicines). Read all labels to be sure you don't take too much.   For children, check the Tylenol bottle for the right dose. The dose is based on the child's age or weight.    If you have other health problems (like cancer, heart failure, an organ transplant or severe kidney disease): Call your specialty clinic if you don't feel better in the next 2 days.       Know when to call 911. Emergency warning signs include:    Trouble breathing or shortness of breath Pain or pressure in the chest that doesn't go away Feeling confused like you haven't felt before, or not being able to wake up Bluish-colored lips or face.  Where can I get more information?   [facility] -- About COVID-19: [email or web address]   CDC -- What to Do If You're Sick: www.cdc.gov/coronavirus/2019-ncov/about/steps-when-sick.html   CDC -- Ending Home Isolation: www.cdc.gov/coronavirus/2019-ncov/hcp/disposition-in-home-patients.html   CDC -- Caring for Someone: www.cdc.gov/coronavirus/2019-ncov/if-you-are-sick/care-for-someone.html   [facility] -- Interim Guidance for Hospital Discharge to Home: www.health.[facility].[place]/diseases/coronavirus/hcp/hospdischarge.pdf   [facility] clinical trials (COVID-19 research studies): clinicalaffairs.[facility]/umn-clinical-trials    Below are the COVID-19 hotlines at the [facility] ([facility]). Interpreters are available.    For health questions: Call 865-982-7838 or 1-500.270.9931 (7 a.m. to 7 p.m.) For questions about schools and childcare: Call 012-168-1955 or 1-762.438.7968 (7 a.m. to 7 p.m.)    Diagnosis: Contact with and (suspected) exposure to other viral communicable diseases  Diagnosis ICD: Z20.828  Additional Clinician Notes:  If your symptoms are not improving or worsen, please go to one of our urgent care locations for evaluation and possible lab work.

## 2020-10-14 DIAGNOSIS — Z20.822 SUSPECTED 2019 NOVEL CORONAVIRUS INFECTION: Primary | ICD-10-CM

## 2020-10-14 RX ORDER — CLOBETASOL PROPIONATE 0.5 MG/G
OINTMENT TOPICAL AT BEDTIME
Qty: 30 G | Refills: 0 | Status: SHIPPED | OUTPATIENT
Start: 2020-10-14 | End: 2021-02-11

## 2020-10-14 NOTE — TELEPHONE ENCOUNTER
Noted. Rx sent.   Please have patient schedule a 2-4 weeks after starting the ointment for a med check visit and to evaluate if symptoms have improved.

## 2020-10-14 NOTE — TELEPHONE ENCOUNTER
Left message on patients voicemail, rx sent to preferred pharmacy. Call back to schedule appt in 2-4 weeks for med check and to evaluate if symptoms have improved.

## 2020-10-16 DIAGNOSIS — Z20.822 SUSPECTED 2019 NOVEL CORONAVIRUS INFECTION: ICD-10-CM

## 2020-10-16 PROCEDURE — U0003 INFECTIOUS AGENT DETECTION BY NUCLEIC ACID (DNA OR RNA); SEVERE ACUTE RESPIRATORY SYNDROME CORONAVIRUS 2 (SARS-COV-2) (CORONAVIRUS DISEASE [COVID-19]), AMPLIFIED PROBE TECHNIQUE, MAKING USE OF HIGH THROUGHPUT TECHNOLOGIES AS DESCRIBED BY CMS-2020-01-R: HCPCS | Performed by: FAMILY MEDICINE

## 2020-10-17 LAB
SARS-COV-2 RNA SPEC QL NAA+PROBE: NOT DETECTED
SPECIMEN SOURCE: NORMAL

## 2020-12-21 ENCOUNTER — MYC MEDICAL ADVICE (OUTPATIENT)
Dept: FAMILY MEDICINE | Facility: CLINIC | Age: 80
End: 2020-12-21

## 2020-12-21 NOTE — TELEPHONE ENCOUNTER
Shayy message sent to patient.    Elin BSN-RN  Triage Nurse  [facility]: [facility]

## 2020-12-27 ENCOUNTER — HEALTH MAINTENANCE LETTER (OUTPATIENT)
Age: 80
End: 2020-12-27

## 2021-02-09 NOTE — PATIENT INSTRUCTIONS
Patient Education   Personalized Prevention Plan  You are due for the preventive services outlined below.  Your care team is available to assist you in scheduling these services.  If you have already completed any of these items, please share that information with your care team to update in your medical record.  Health Maintenance Due   Topic Date Due     COVID-19 Vaccine (1 of 2) 10[birth year]21/1956     Zoster (Shingles) Vaccine (1 of 2) 10/21/1989     Pneumococcal Vaccine (1 of 1 - PPSV23) 10/21/2004     PHQ-2  01/01/2021

## 2021-02-09 NOTE — PROGRESS NOTES
"  SUBJECTIVE:   Bee Yepez is a 80 year old female who presents for Preventive Visit.      Patient has been advised of split billing requirements and indicates understanding: Yes   Patient would still like to discuss the following concern(s):  1. Hearing issues-left- cerumen impaction bilaterally; hearing changes on left.  MA will complete ear wash.        Are you in the first 12 months of your Medicare Part B coverage?  No    Physical Health:    In general, how would you rate your overall physical health? good    Outside of work, how many days during the week do you exercise? none    Outside of work, approximately how many minutes a day do you exercise?not applicable    If you drink alcohol do you typically have >3 drinks per day or >7 drinks per week? No    Do you usually eat at least 4 servings of fruit and vegetables a day, include whole grains & fiber and avoid regularly eating high fat or \"junk\" foods? NO    Do you have any problems taking medications regularly?  No    Do you have any side effects from medications? none    Needs assistance for the following daily activities: no assistance needed    Which of the following safety concerns are present in your home?  none identified   Hearing impairment: Yes,   Right Ear:      1000 Hz RESPONSE- on Level: 40 db (Conditioning sound)   1000 Hz: RESPONSE- on Level: 25 db   2000 Hz: RESPONSE- on Level: 30 db   4000 Hz: RESPONSE- on Level:   20 db     Left Ear:      4000 Hz: RESPONSE- on Level: 55 db   2000 Hz: RESPONSE- on Level: 50 db   1000 Hz: RESPONSE- on Level: 30 db    500 Hz: RESPONSE- on Level: 40 db    Right Ear:    500 Hz: RESPONSE- on Level: 45 db    Hearing Acuity: RESCREEN:  cerumen impaction      Hearing Assessment: abnormal--middle ear fluid present: rescreen in clinic in 8-10 weeks if needed per pt request    In the past 6 months, have you been bothered by leaking of urine? yes    Mental Health:    In general, how would you rate your overall mental " or emotional health? excellent  PHQ-2 Score:      Do you feel safe in your environment? Yes    Have you ever done Advance Care Planning? (For example, a Health Directive, POLST, or a discussion with a medical provider or your loved ones about your wishes): Yes, patient states has an Advance Care Planning document and will bring a copy to the clinic.    Fall risk:  Fallen 2 or more times in the past year?: Yes  Any fall with injury in the past year?: No  Timed Up and Go Test (>13.5 is fall risk; contact physician) : 13    Cognitive Screenin) Repeat 3 items (Leader, Season, Table)    2) Clock draw: NORMAL  3) 3 item recall: Recalls 1 object   Results: NORMAL clock, 1-2 items recalled: COGNITIVE IMPAIRMENT LESS LIKELY    Mini-CogTM Copyright S [employer]. Licensed by the author for use in [facility]; reprinted with permission (gavin@[facility]). All rights reserved.      Do you have sleep apnea, excessive snoring or daytime drowsiness?: no        Reviewed and updated as needed this visit by clinical staff  Tobacco  Allergies  Meds   Med Hx  Surg Hx  Fam Hx  Soc Hx        Reviewed and updated as needed this visit by Provider                Social History     Tobacco Use     Smoking status: Former Smoker     Smokeless tobacco: Never Used   Substance Use Topics     Alcohol use: No     Alcohol/week: 0.0 standard drinks                           Current providers sharing in care for this patient include:   Patient Care Team:  Rosemary Rahman MD as PCP - General (Family Practice)  Rosemary Rahman MD as Assigned PCP  [facility]    The following health maintenance items are reviewed in Epic and correct as of today:  Health Maintenance   Topic Date Due     COVID-19 Vaccine (1 of 2) 10/21/1956     ZOSTER IMMUNIZATION (1 of 2) 10/21/1989     Pneumococcal Vaccine: 65+ Years (1 of 1 - PPSV23) 10/21/2004     FALL RISK ASSESSMENT  2021     MEDICARE ANNUAL  WELLNESS VISIT  2022     DTAP/TDAP/TD IMMUNIZATION (2 - Td) 2025     ADVANCE CARE PLANNING  2026     DEXA  2035     PHQ-2  Completed     INFLUENZA VACCINE  Completed     Pneumococcal Vaccine: Pediatrics (0 to 5 Years) and At-Risk Patients (6 to 64 Years)  Aged Out     IPV IMMUNIZATION  Aged Out     MENINGITIS IMMUNIZATION  Aged Out     HEPATITIS B IMMUNIZATION  Aged Out     Lab work is in process  Labs reviewed in EPIC  BP Readings from Last 3 Encounters:   21 (!) 156/94   20 136/82   19 131/82    Wt Readings from Last 3 Encounters:   21 84.4 kg (186 lb)   20 84.4 kg (186 lb)   19 78.5 kg (173 lb)                  Patient Active Problem List   Diagnosis     Family history of breast cancer     Family history of colon cancer     Advanced directives, counseling/discussion     Trigeminal neuralgia     Osteopenia, unspecified location     Past Surgical History:   Procedure Laterality Date     CHOLECYSTECTOMY      in the 1970s     TUBAL LIGATION         Social History     Tobacco Use     Smoking status: Former Smoker     Smokeless tobacco: Never Used   Substance Use Topics     Alcohol use: No     Alcohol/week: 0.0 standard drinks     Family History   Problem Relation Age of Onset     Coronary Artery Disease Father         had a pacemaker     Breast Cancer Sister          at age 45     Colon Cancer Sister         Dx in her 60s     Lung Cancer Sister         Dx in her 60s     Diabetes No family hx of          Current Outpatient Medications   Medication Sig Dispense Refill     aspirin (ASA) 81 MG EC tablet Take 1 tablet (81 mg) by mouth daily       calcium carbonate-vitamin D 600-400 MG-UNIT CHEW Take 1 chew tab by mouth 2 times daily       carBAMazepine (TEGRETOL XR) 200 MG 12 hr tablet TAKE ONE TABLET BY MOUTH TWICE DAILY 60 tablet 11     Cyanocobalamin (B-12 PO)        triamcinolone (KENALOG) 0.1 % external ointment Apply topically 2 times daily 30 g 1     No  "Known Allergies  Recent Labs   Lab Test 06/09/20  0953 02/15/19  1028 01/05/18 08/11/17  0952 08/11/17  0952 03/25/13  0000 03/25/13   LDL  --  129*  --   --  129*  --  146   HDL  --  67  --   --  51  --  49   TRIG  --  146  --   --  141  --  137   ALT 23  --  19  --  32  --   --    CR 0.87  --  1.06*   < > 1.08*  --   --    GFRESTIMATED 62  --  50*   < > 49*  --   --    GFRESTBLACK 72  --  58*   < > 59*  --   --    POTASSIUM 4.4  --  4.9  --  4.8   < >  --    TSH 3.37  --   --   --   --   --   --     < > = values in this interval not displayed.      Mammogram Screening: Mammogram Screening - Mammography discussed and declined  Advised her to let me know if she ever has a change in normal symptoms for her.   ROS:  CONSTITUTIONAL: NEGATIVE for fever, chills, change in weight  INTEGUMENTARY/SKIN: NEGATIVE for worrisome moles or lesions POSITIVE for itchy rash to left ankle.   EYES: NEGATIVE for vision changes or irritation  ENT/MOUTH: NEGATIVE for ear, mouth and throat problems  RESP: NEGATIVE for significant cough or SOB  BREAST: NEGATIVE for masses, tenderness or discharge  CV: NEGATIVE for chest pain, palpitations or peripheral edema  GI: NEGATIVE for nausea, abdominal pain, heartburn, or change in bowel habits  : NEGATIVE for frequency, dysuria, or hematuria  MUSCULOSKELETAL: NEGATIVE for significant arthralgias or myalgia  NEURO: NEGATIVE for weakness, dizziness or paresthesias  ENDOCRINE: NEGATIVE for temperature intolerance, skin/hair changes  HEME: NEGATIVE for bleeding problems  PSYCHIATRIC: NEGATIVE for changes in mood or affect    OBJECTIVE:   BP (!) 156/94   Pulse 76   Temp 97.7  F (36.5  C) (Tympanic)   Resp 16   Ht 1.64 m (5' 4.57\")   Wt 84.4 kg (186 lb)   SpO2 92%   BMI 31.37 kg/m   Estimated body mass index is 31.37 kg/m  as calculated from the following:    Height as of this encounter: 1.64 m (5' 4.57\").    Weight as of this encounter: 84.4 kg (186 lb).  EXAM:   GENERAL APPEARANCE: healthy, " alert and no distress  EYES: Eyes grossly normal to inspection, PERRL and conjunctivae and sclerae normal  HENT: POSITIVE for cerumen impaction bilaterally; once cleared by MA- ear canals and TM's normal, nose and mouth without ulcers or lesions, oropharynx clear and oral mucous membranes moist  NECK: no adenopathy, no asymmetry, masses, or scars and thyroid normal to palpation  RESP: lungs clear to auscultation - no rales, rhonchi or wheezes  BREAST: deferred per guidelines- asymptomatic per pt.   CV: regular rate and rhythm, normal S1 S2, no S3 or S4, no murmur, click or rub, no peripheral edema and peripheral pulses strong  ABDOMEN: soft, nontender, no hepatosplenomegaly, no masses and bowel sounds normal   (female): deferred per pt- no concerns.   MS: no musculoskeletal defects are noted and gait is age appropriate without ataxia, no CVA tenderness  SKIN: no suspicious lesions POSITIVE for spider veins to bilateral ankles, Left ankle has slight area dryness  NEURO: Normal strength and tone, cranial nerves intact, sensory exam grossly normal, mentation intact and speech normal  PSYCH: mentation appears normal and affect normal/bright    Diagnostic Test Results:  Labs reviewed in Epic  See orders    ASSESSMENT / PLAN:       ICD-10-CM    1. Encounter for Medicare annual wellness exam  Z00.00    2. Bilateral impacted cerumen  H61.23 REMOVE IMPACTED CERUMEN   3. Rash  R21 triamcinolone (KENALOG) 0.1 % external ointment   4. Itching  L29.9 triamcinolone (KENALOG) 0.1 % external ointment   5. Lipid screening  Z13.220 Lipid panel reflex to direct LDL Non-fasting   6. Screening for thyroid disorder  Z13.29 TSH with free T4 reflex   7. Screening for diabetes mellitus  Z13.1 Glucose       Patient has been advised of split billing requirements and indicates understanding: Yes    COUNSELING:  Reviewed preventive health counseling, as reflected in patient instructions    Estimated body mass index is 31.37 kg/m  as  "calculated from the following:    Height as of this encounter: 1.64 m (5' 4.57\").    Weight as of this encounter: 84.4 kg (186 lb).    Weight management plan: Discussed healthy diet and exercise guidelines    She reports that she has quit smoking. She has never used smokeless tobacco.    Appropriate preventive services were discussed with this patient, including applicable screening as appropriate for cardiovascular disease, diabetes, osteopenia/osteoporosis, and glaucoma.  As appropriate for age/gender, discussed screening for colorectal cancer, prostate cancer, breast cancer, and cervical cancer. Checklist reviewing preventive services available has been given to the patient.    Reviewed patients plan of care and provided an AVS. The Basic Care Plan (routine screening as documented in Health Maintenance) for Bee meets the Care Plan requirement. This Care Plan has been established and reviewed with the Patient.    Counseling Resources:  ATP IV Guidelines  Pooled Cohorts Equation Calculator  Breast Cancer Risk Calculator  BRCA-Related Cancer Risk Assessment: FHS-7 Tool  FRAX Risk Assessment  ICSI Preventive Guidelines  Dietary Guidelines for Americans, 2010  USDA's MyPlate  ASA Prophylaxis  Lung CA Screening    JOSE E Marie  [facility]  "

## 2021-02-11 ENCOUNTER — OFFICE VISIT (OUTPATIENT)
Dept: FAMILY MEDICINE | Facility: CLINIC | Age: 81
End: 2021-02-11
Payer: COMMERCIAL

## 2021-02-11 VITALS
TEMPERATURE: 97.7 F | OXYGEN SATURATION: 92 % | HEART RATE: 76 BPM | SYSTOLIC BLOOD PRESSURE: 156 MMHG | HEIGHT: 65 IN | DIASTOLIC BLOOD PRESSURE: 94 MMHG | RESPIRATION RATE: 16 BRPM | WEIGHT: 186 LBS | BODY MASS INDEX: 30.99 KG/M2

## 2021-02-11 DIAGNOSIS — Z13.220 LIPID SCREENING: ICD-10-CM

## 2021-02-11 DIAGNOSIS — Z00.00 ENCOUNTER FOR MEDICARE ANNUAL WELLNESS EXAM: Primary | ICD-10-CM

## 2021-02-11 DIAGNOSIS — R21 RASH: ICD-10-CM

## 2021-02-11 DIAGNOSIS — Z13.29 SCREENING FOR THYROID DISORDER: ICD-10-CM

## 2021-02-11 DIAGNOSIS — H61.23 BILATERAL IMPACTED CERUMEN: ICD-10-CM

## 2021-02-11 DIAGNOSIS — L29.9 ITCHING: ICD-10-CM

## 2021-02-11 DIAGNOSIS — Z13.1 SCREENING FOR DIABETES MELLITUS: ICD-10-CM

## 2021-02-11 LAB
CHOLEST SERPL-MCNC: 237 MG/DL
GLUCOSE SERPL-MCNC: 100 MG/DL (ref 70–99)
HDLC SERPL-MCNC: 58 MG/DL
LDLC SERPL CALC-MCNC: 145 MG/DL
NONHDLC SERPL-MCNC: 179 MG/DL
TRIGL SERPL-MCNC: 170 MG/DL
TSH SERPL DL<=0.005 MIU/L-ACNC: 2.05 MU/L (ref 0.4–4)

## 2021-02-11 PROCEDURE — 99214 OFFICE O/P EST MOD 30 MIN: CPT | Mod: 25 | Performed by: NURSE PRACTITIONER

## 2021-02-11 PROCEDURE — 36415 COLL VENOUS BLD VENIPUNCTURE: CPT | Performed by: NURSE PRACTITIONER

## 2021-02-11 PROCEDURE — 82947 ASSAY GLUCOSE BLOOD QUANT: CPT | Performed by: NURSE PRACTITIONER

## 2021-02-11 PROCEDURE — 84443 ASSAY THYROID STIM HORMONE: CPT | Performed by: NURSE PRACTITIONER

## 2021-02-11 PROCEDURE — G0438 PPPS, INITIAL VISIT: HCPCS | Performed by: NURSE PRACTITIONER

## 2021-02-11 PROCEDURE — 69209 REMOVE IMPACTED EAR WAX UNI: CPT | Mod: 50 | Performed by: NURSE PRACTITIONER

## 2021-02-11 PROCEDURE — 80061 LIPID PANEL: CPT | Performed by: NURSE PRACTITIONER

## 2021-02-11 RX ORDER — TRIAMCINOLONE ACETONIDE 1 MG/G
OINTMENT TOPICAL 2 TIMES DAILY
Qty: 30 G | Refills: 1 | Status: SHIPPED | OUTPATIENT
Start: 2021-02-11 | End: 2023-07-13

## 2021-02-11 ASSESSMENT — MIFFLIN-ST. JEOR: SCORE: 1302.69

## 2021-02-12 NOTE — RESULT ENCOUNTER NOTE
Slade Gamboa,    Thank you for your recent office visit.    Here are your recent results.  Normal/ non-fasting blood labs.     Feel free to contact me via [employer] or call the clinic at [phone].    Sincerely,    RAQUEL Mcneil, FNP-BC

## 2021-03-09 ENCOUNTER — MYC MEDICAL ADVICE (OUTPATIENT)
Dept: FAMILY MEDICINE | Facility: CLINIC | Age: 81
End: 2021-03-09

## 2021-07-15 ENCOUNTER — TELEPHONE (OUTPATIENT)
Dept: FAMILY MEDICINE | Facility: CLINIC | Age: 81
End: 2021-07-15

## 2021-07-15 NOTE — PROGRESS NOTES
Assessment & Plan     Urinary symptom or sign    - UA with Microscopic reflex to Culture  - Urine Microscopic    Overactive bladder    - oxybutynin ER (DITROPAN-XL) 10 MG 24 hr tablet; Take 1 tablet (10 mg) by mouth daily    20 minutes spent on the date of the encounter doing chart review, history and exam, documentation and further activities per the note     See Patient Instructions: No signs of infection. We will start you on the over active bladder medication.  If this does not help your symptoms over the next few weeks, let me know and I will place a referral for you to see urology. Follow up as needed.     Return in about 6 months (around 1/16/2022), or if symptoms worsen or fail to improve.    JOSE E Marie  [facility] TYLOR Gamboa is a 80 year old who presents for the following health issues     HPI     Urinary SYMPTOMS     Onset: 2 months    Description:   Painful urination (Dysuria): no   Blood in urine (Hematuria): no   Frequency/Urgency: YES- having to get up 3-4 times a night to urinate  Abdominal/Pelvic/Flank Pain : no   Rash: no    Other vaginal symptoms: vaginal itching    Progression of Symptoms:  worsening  Therapies Tried and outcome: otc urinary pain relief , cranberry tablets      Review of Systems   Constitutional, HEENT, cardiovascular, pulmonary, GI, , musculoskeletal, neuro, skin, endocrine and psych systems are negative, except as otherwise noted.      Objective    BP (!) 161/75   Pulse 93   Temp (!) 96.3  F (35.7  C) (Tympanic)   Resp 16   Wt 83.4 kg (183 lb 12.8 oz)   SpO2 96%   BMI 31.00 kg/m    Body mass index is 31 kg/m .  Physical Exam   GENERAL: healthy, alert and no distress  RESP: lungs clear to auscultation - no rales, rhonchi or wheezes  CV: regular rate and rhythm, normal S1 S2, no S3 or S4, no murmur, click or rub, no peripheral edema and peripheral pulses strong  ABDOMEN: soft, nontender, no hepatosplenomegaly, no masses and  bowel sounds normal  MS: no gross musculoskeletal defects noted, no edema, no CVA tenderness  SKIN: no suspicious rashes  PSYCH: mentation appears normal, affect normal/bright    See orders

## 2021-07-15 NOTE — TELEPHONE ENCOUNTER
ROXANE  Spoke with patient she reports urinary frequency/urgency for several months (starting feb) that is gradually worsening.  She denies any dysuria, no hematuria, no fever, no confusion.  Frequency worse at night, she has been getting up 4 times nightly.  appt scheduled for tomorrow with PCP.  (OTC urine test came back as elevated leukocytes).  Mindi Sarah RN  [facility]

## 2021-07-16 ENCOUNTER — OFFICE VISIT (OUTPATIENT)
Dept: FAMILY MEDICINE | Facility: CLINIC | Age: 81
End: 2021-07-16
Payer: COMMERCIAL

## 2021-07-16 VITALS
DIASTOLIC BLOOD PRESSURE: 81 MMHG | OXYGEN SATURATION: 96 % | RESPIRATION RATE: 16 BRPM | WEIGHT: 183.8 LBS | SYSTOLIC BLOOD PRESSURE: 132 MMHG | BODY MASS INDEX: 31 KG/M2 | HEART RATE: 93 BPM | TEMPERATURE: 96.3 F

## 2021-07-16 DIAGNOSIS — R39.9 URINARY SYMPTOM OR SIGN: Primary | ICD-10-CM

## 2021-07-16 DIAGNOSIS — N89.8 VAGINAL ITCHING: ICD-10-CM

## 2021-07-16 DIAGNOSIS — N32.81 OVERACTIVE BLADDER: ICD-10-CM

## 2021-07-16 LAB
ALBUMIN UR-MCNC: NEGATIVE MG/DL
APPEARANCE UR: CLEAR
BACTERIA #/AREA URNS HPF: ABNORMAL /HPF
BILIRUB UR QL STRIP: NEGATIVE
COLOR UR AUTO: YELLOW
GLUCOSE UR STRIP-MCNC: NEGATIVE MG/DL
HGB UR QL STRIP: NEGATIVE
KETONES UR STRIP-MCNC: NEGATIVE MG/DL
LEUKOCYTE ESTERASE UR QL STRIP: NEGATIVE
NITRATE UR QL: NEGATIVE
PH UR STRIP: 6.5 [PH] (ref 5–7)
RBC #/AREA URNS AUTO: ABNORMAL /HPF
SP GR UR STRIP: 1.01 (ref 1–1.03)
SQUAMOUS #/AREA URNS AUTO: ABNORMAL /LPF
UROBILINOGEN UR STRIP-ACNC: 0.2 E.U./DL
WBC #/AREA URNS AUTO: ABNORMAL /HPF

## 2021-07-16 PROCEDURE — 99213 OFFICE O/P EST LOW 20 MIN: CPT | Performed by: NURSE PRACTITIONER

## 2021-07-16 PROCEDURE — 81001 URINALYSIS AUTO W/SCOPE: CPT | Performed by: NURSE PRACTITIONER

## 2021-07-16 RX ORDER — FLUCONAZOLE 150 MG/1
150 TABLET ORAL ONCE
Qty: 1 TABLET | Refills: 0 | Status: SHIPPED | OUTPATIENT
Start: 2021-07-16 | End: 2021-07-16

## 2021-07-16 RX ORDER — MICONAZOLE NITRATE 20 MG/G
CREAM TOPICAL 2 TIMES DAILY
Qty: 35 G | Refills: 1 | Status: SHIPPED | OUTPATIENT
Start: 2021-07-16 | End: 2022-03-18

## 2021-07-16 RX ORDER — OXYBUTYNIN CHLORIDE 10 MG/1
10 TABLET, EXTENDED RELEASE ORAL DAILY
Qty: 90 TABLET | Refills: 1 | Status: SHIPPED | OUTPATIENT
Start: 2021-07-16 | End: 2022-03-18

## 2021-07-16 NOTE — PATIENT INSTRUCTIONS
No signs of infection. We will start you on the over active bladder medication.  If this does not help your symptoms over the next few weeks, let me know and I will place a referral for you to see urology. Follow up as needed.     Patient Education     Overactive Bladder Syndrome (OAB)  When the bladder muscles squeeze (contract) involuntarily, it is called overactive bladder syndrome (OAB). This causes an intense urge to urinate, called urgency. Urgency can occur many times during the day and night. If urine leaks with the urgency, it is called urge incontinence.   A disease that affects the bladder nerves, such as multiple sclerosis, can cause overactive bladder syndrome. Other conditions can also lead to OAB. These include urinary tract infection (UTI) or prostate problems in men. But the exact cause is often not known.      Normally, urine stays in the bladder until a person decides to release it. With OAB, the bladder muscles contract involuntarily, causing a sudden urge to urinate and even urine leakage.   How is overactive bladder syndrome diagnosed?   Your healthcare provider will examine you and ask about your symptoms and health history. You may also have 1 or more of these tests:     Urine test. Urine samples are taken and checked for problems.    Urinary diary. You record how much fluid you take in and urinate out for 3 days.    Bladder ultrasound. This studies the bladder as it empties. Ultrasound uses sound waves to make detailed images of the inside of the body.    Cystoscopy. This test lets the provider look for problems in the urinary tract. The test uses a thin, flexible scope (cystoscope) with a light and camera on the end. The scope is put into the tube that takes urine out of the body (urethra).    Urodynamic studies. This is a group of tests. They measure and record many aspects of urinary bladder function. This includes pressures, volume, and urine flow.  How is overactive bladder syndrome  treated?   Treatment depends on the cause and severity of your OAB. Treatments may include:     Changing your urination habits. Your healthcare provider may advise you to urinate as soon as you feel the urge. You may also need to limit how much fluid you have during the day.    Exercising your pelvic muscles. This can help strengthen muscles used during urination. These exercises are called Kegels. They include ziggy as if you were stopping your urine stream. And tightening your rectum as if trying not to pass gas. Your provider can help you learn how to do Kegels.    Biofeedback. This can help you learn to control the movement of your bladder muscles. Sensors are placed on your belly. They turn signals given off by your muscles into lines on a computer screen.    Medicine. This may be given to relax the bladder muscle. Medicine can also help ease bladder contractions. This reduces the urge to urinate.    Neuromodulation. This may be done if medicine and behavioral changes don t work. Electrical pulses are sent to the nerves that affect the pelvic area (sacral nerves). These pulses help relieve OAB and urge incontinence.    Botulinum toxin shots. These can be injected into the muscle of the bladder to relax the muscles.    Surgery. When less invasive treatments don't work, surgery to make the bladder larger may be done in severe cases.  With treatment, OAB can be managed. A condition, such as UTI, that has led to your OAB will be treated. Treatment may include taking medicine for months or years. You may also need to make changes in your daily routine. This may include going to the bathroom more often than you think you need to. Or you may need to limit caffeine and alcohol because these can make symptoms worse. Your healthcare provider can tell you more.   When to call your healthcare provider  Call the healthcare provider right away if you have any of these:     Fever of  100.4  F ( 38.0 C ) or higher, or as  directed by your provider    Symptoms don't get better, or get worse with treatment    Trouble urinating because of pain    Back or belly pain  [employer] last reviewed this educational content on 6/1/2019 2000-2021 [employer]. All rights reserved. This information is not intended as a substitute for professional medical care. Always follow your healthcare professional's instructions.           Patient Education     Abrasions  Abrasions are skin scrapes. Their treatment depends on how large and deep the abrasion is.   Home care  You may be prescribed an antibiotic cream or ointment to apply to the wound. This helps prevent infection. Follow instructions when using this medicine.   General care    To care for the abrasion, do the following each day for as long as directed by your healthcare provider:  ? If you were given a bandage, change it once a day. If your bandage sticks to the wound, soak it in warm water until it loosens.  ? Wash the area with soap and warm water. You may do this in a sink or under a tub faucet or shower. Rinse off the soap. Then pat the area dry with a clean towel.  ? If antibiotic ointment or cream was prescribed, reapply it to the wound as directed. Cover the wound with a fresh nonstick bandage. If the bandage becomes wet or dirty, change it as soon as possible.  ? Some antibiotic ointments or cream can cause an allergic reaction or dermatitis. This may cause redness, itching and or hives. If this occurs, stop using the ointment right away and wash off any remaining ointment. You may need to take some allergy medicine to relieve symptoms.    You may use acetaminophen or ibuprofen to control pain unless another pain medicine was prescribed. Talk with your healthcare provider before using these medicines if you have chronic liver or kidney disease or ever had a stomach ulcer or GI (gastrointestinal) bleeding. Don t use ibuprofen in children younger than 6 months old.    Most  skin wounds heal within 10 days. But an infection may occur even with treatment. So it s important to watch the wound for signs of infection as listed below.    Follow-up care  Follow up with your healthcare provider, or as advised.  When to get medical advice  Call your healthcare provider right away if any of these occur:    Fever of 100.4 F (38 C) or higher, or as directed by your healthcare provider    Increasing pain, redness, swelling, or drainage from the wound    Bleeding from the wound that does not stop after a few minutes of steady, firm pressure    Decreased ability to move any body part near the wound  [employer] last reviewed this educational content on 8/1/2019 2000-2021 [employer]. All rights reserved. This information is not intended as a substitute for professional medical care. Always follow your healthcare professional's instructions.

## 2021-07-19 ENCOUNTER — TRANSFERRED RECORDS (OUTPATIENT)
Dept: HEALTH INFORMATION MANAGEMENT | Facility: CLINIC | Age: 81
End: 2021-07-19

## 2021-07-21 ENCOUNTER — TRANSFERRED RECORDS (OUTPATIENT)
Dept: HEALTH INFORMATION MANAGEMENT | Facility: CLINIC | Age: 81
End: 2021-07-21

## 2021-07-21 ENCOUNTER — OFFICE VISIT (OUTPATIENT)
Dept: FAMILY MEDICINE | Facility: CLINIC | Age: 81
End: 2021-07-21
Payer: COMMERCIAL

## 2021-07-21 VITALS
OXYGEN SATURATION: 95 % | WEIGHT: 182 LBS | BODY MASS INDEX: 30.69 KG/M2 | HEART RATE: 94 BPM | DIASTOLIC BLOOD PRESSURE: 79 MMHG | SYSTOLIC BLOOD PRESSURE: 121 MMHG | TEMPERATURE: 98.2 F

## 2021-07-21 DIAGNOSIS — S52.341A: ICD-10-CM

## 2021-07-21 DIAGNOSIS — S52.235B: Primary | ICD-10-CM

## 2021-07-21 LAB
BASOPHILS # BLD AUTO: 0 10E3/UL (ref 0–0.2)
BASOPHILS NFR BLD AUTO: 0 %
EOSINOPHIL # BLD AUTO: 0.2 10E3/UL (ref 0–0.7)
EOSINOPHIL NFR BLD AUTO: 3 %
ERYTHROCYTE [DISTWIDTH] IN BLOOD BY AUTOMATED COUNT: 12.2 % (ref 10–15)
HCT VFR BLD AUTO: 37.2 % (ref 35–47)
HGB BLD-MCNC: 12.3 G/DL (ref 11.7–15.7)
LYMPHOCYTES # BLD AUTO: 2.9 10E3/UL (ref 0.8–5.3)
LYMPHOCYTES NFR BLD AUTO: 31 %
MCH RBC QN AUTO: 32.6 PG (ref 26.5–33)
MCHC RBC AUTO-ENTMCNC: 33.1 G/DL (ref 31.5–36.5)
MCV RBC AUTO: 99 FL (ref 78–100)
MONOCYTES # BLD AUTO: 1.2 10E3/UL (ref 0–1.3)
MONOCYTES NFR BLD AUTO: 13 %
NEUTROPHILS # BLD AUTO: 5 10E3/UL (ref 1.6–8.3)
NEUTROPHILS NFR BLD AUTO: 53 %
PLATELET # BLD AUTO: 265 10E3/UL (ref 150–450)
RBC # BLD AUTO: 3.77 10E6/UL (ref 3.8–5.2)
WBC # BLD AUTO: 9.4 10E3/UL (ref 4–11)

## 2021-07-21 PROCEDURE — 99214 OFFICE O/P EST MOD 30 MIN: CPT | Performed by: FAMILY MEDICINE

## 2021-07-21 PROCEDURE — 36415 COLL VENOUS BLD VENIPUNCTURE: CPT | Performed by: FAMILY MEDICINE

## 2021-07-21 PROCEDURE — 85025 COMPLETE CBC W/AUTO DIFF WBC: CPT | Performed by: FAMILY MEDICINE

## 2021-07-21 NOTE — PROGRESS NOTES
[facility]  [address] [facility] [address]  Phone: [phone]  Primary Provider: Marisol Kamara         PREOPERATIVE EVALUATION:  Today's date: 7/21/2021    Bee Yepez is a 80 year old female who presents for a preoperative evaluation.    Surgical Information:  Surgery/Procedure: ORIF Left distal radius  Surgery Location: [facility]  Surgeon: Dr. Novak  Surgery Date: 7/23/21  Time of Surgery: TTBD  Where patient plans to recover: At home with family  Fax number for surgical facility: Note does not need to be faxed, will be available electronically in Epic.    Type of Anesthesia Anticipated: General    Assessment & Plan     The proposed surgical procedure is considered LOW risk.    Closed fracture of distal end of left ulna, unspecified fracture morphology, initial encounter       Pathological fracture of left radius, unspecified pathological cause, initial encounter              Risks and Recommendations:  The patient has the following additional risks and recommendations for perioperative complications:   - No identified additional risk factors other than previously addressed    Medication Instructions:  Patient is to take all scheduled medications on the day of surgery    RECOMMENDATION:  APPROVAL GIVEN to proceed with proposed procedure, without further diagnostic evaluation.    Review of external notes as documented above   Independent interpretation of a test performed by another physician/other qualified health care professional (not separately reported) -    Discussion of management or test interpretation with external physician/other qualified healthcare professional/appropriate source -    Diagnosis or treatment significantly limited by social determinants of health -             Subjective     HPI related to upcoming procedure: ulnar and radial fracture of left forearm      Preop Questions 7/21/2021   1. Have you ever had a heart attack or  stroke? No   2. Have you ever had surgery on your heart or blood vessels, such as a stent placement, a coronary artery bypass, or surgery on an artery in your head, neck, heart, or legs? No   3. Do you have chest pain with activity? No   4. Do you have a history of  heart failure? No   5. Do you currently have a cold, bronchitis or symptoms of other infection? No   6. Do you have a cough, shortness of breath, or wheezing? No   7. Do you or anyone in your family have previous history of blood clots? No   8. Do you or does anyone in your family have a serious bleeding problem such as prolonged bleeding following surgeries or cuts? No   9. Have you ever had problems with anemia or been told to take iron pills? No   10. Have you had any abnormal blood loss such as black, tarry or bloody stools, or abnormal vaginal bleeding? No   11. Have you ever had a blood transfusion? No   12. Are you willing to have a blood transfusion if it is medically needed before, during, or after your surgery? Yes   13. Have you or any of your relatives ever had problems with anesthesia? No   14. Do you have sleep apnea, excessive snoring or daytime drowsiness? No   15. Do you have any artifical heart valves or other implanted medical devices like a pacemaker, defibrillator, or continuous glucose monitor? No   16. Do you have artificial joints? No   17. Are you allergic to latex? No     Health Care Directive:  Patient does not have a Health Care Directive or Living Will: Discussed advance care planning with patient; however, patient declined at this time.    Preoperative Review of :   reviewed - no record of controlled substances prescribed.       Status of Chronic Conditions:  Trigeminal neuralgia    Review of Systems  CONSTITUTIONAL: NEGATIVE for fever, chills, change in weight  INTEGUMENTARY/SKIN: NEGATIVE for worrisome rashes, moles or lesions  EYES: NEGATIVE for vision changes or irritation  ENT/MOUTH: NEGATIVE for ear, mouth and  throat problems  RESP: NEGATIVE for significant cough or SOB  CV: NEGATIVE for chest pain, palpitations or peripheral edema  GI: NEGATIVE for nausea, abdominal pain, heartburn, or change in bowel habits  : NEGATIVE for frequency, dysuria, or hematuria  MUSCULOSKELETAL: NEGATIVE for significant arthralgias or myalgia  NEURO: NEGATIVE for weakness, dizziness or paresthesias  ENDOCRINE: NEGATIVE for temperature intolerance, skin/hair changes  HEME: NEGATIVE for bleeding problems  PSYCHIATRIC: NEGATIVE for changes in mood or affect    Patient Active Problem List    Diagnosis Date Noted     Osteopenia, unspecified location 08/11/2017     Priority: Medium     Advanced directives, counseling/discussion 11/30/2015     Priority: Medium     Advance Care Planning 11/30/2015: ACP Review and Resources Provided:  Reviewed chart for advance care plan.  Bee Yepez has no plan or code status on file. Discussed available resources and provided with information.  Added by Kavya Alonso           Trigeminal neuralgia 11/30/2015     Priority: Medium     Family history of breast cancer 10/21/2015     Priority: Medium     Family history of colon cancer 10/21/2015     Priority: Medium      Past Medical History:   Diagnosis Date     Ex-smoker     3 PPD for 25 years ago, quit in 1985.     Family history of breast cancer     sister at age 45     Family history of colon cancer     sister in her 60s     Osteopenia     per patient report.     Past Surgical History:   Procedure Laterality Date     CHOLECYSTECTOMY      in the 1970s     TUBAL LIGATION       Current Outpatient Medications   Medication Sig Dispense Refill     aspirin (ASA) 81 MG EC tablet Take 1 tablet (81 mg) by mouth daily       calcium carbonate-vitamin D 600-400 MG-UNIT CHEW Take 1 chew tab by mouth 2 times daily       carBAMazepine (TEGRETOL XR) 200 MG 12 hr tablet TAKE ONE TABLET BY MOUTH TWICE DAILY 60 tablet 11     Cyanocobalamin (B-12 PO)        miconazole  (MICATIN) 2 % external cream Apply topically 2 times daily 35 g 1     oxybutynin ER (DITROPAN-XL) 10 MG 24 hr tablet Take 1 tablet (10 mg) by mouth daily 90 tablet 1     triamcinolone (KENALOG) 0.1 % external ointment Apply topically 2 times daily 30 g 1       No Known Allergies     Social History     Tobacco Use     Smoking status: Former Smoker     Smokeless tobacco: Never Used   Substance Use Topics     Alcohol use: No     Alcohol/week: 0.0 standard drinks     Family History   Problem Relation Age of Onset     Coronary Artery Disease Father         had a pacemaker     Breast Cancer Sister          at age 45     Colon Cancer Sister         Dx in her 60s     Lung Cancer Sister         Dx in her 60s     Diabetes No family hx of      History   Drug Use No         Objective     There were no vitals taken for this visit.    Physical Exam    GENERAL APPEARANCE: healthy, alert and no distress     EYES: EOMI, PERRL     HENT: ear canals and TM's normal and nose and mouth without ulcers or lesions     NECK: no adenopathy, no asymmetry, masses, or scars and thyroid normal to palpation     RESP: lungs clear to auscultation - no rales, rhonchi or wheezes     CV: regular rates and rhythm, normal S1 S2, no S3 or S4 and no murmur, click or rub     ABDOMEN:  soft, nontender, no HSM or masses and bowel sounds normal     MS: extremities normal- no gross deformities noted, no evidence of inflammation in joints, FROM in all extremities.     SKIN: no suspicious lesions or rashes     NEURO: Normal strength and tone, sensory exam grossly normal, mentation intact and speech normal     PSYCH: mentation appears normal. and affect normal/bright     LYMPHATICS: No cervical adenopathy    Recent Labs   Lab Test 20  0953   HGB 12.8         POTASSIUM 4.4   CR 0.87        Diagnostics:  No results found for this or any previous visit (from the past 24 hour(s)).   No EKG required, no history of coronary heart disease,  significant arrhythmia, peripheral arterial disease or other structural heart disease.    Revised Cardiac Risk Index (RCRI):  The patient has the following serious cardiovascular risks for perioperative complications:   - No serious cardiac risks = 0 points     RCRI Interpretation: 0 points: Class I (very low risk - 0.4% complication rate)           Signed Electronically by: Kirk Barkley MD  Copy of this evaluation report is provided to requesting physician.

## 2021-10-04 ENCOUNTER — HEALTH MAINTENANCE LETTER (OUTPATIENT)
Age: 81
End: 2021-10-04

## 2021-10-25 ENCOUNTER — TRANSFERRED RECORDS (OUTPATIENT)
Dept: HEALTH INFORMATION MANAGEMENT | Facility: CLINIC | Age: 81
End: 2021-10-25
Payer: COMMERCIAL

## 2021-10-26 ENCOUNTER — TELEPHONE (OUTPATIENT)
Dept: FAMILY MEDICINE | Facility: CLINIC | Age: 81
End: 2021-10-26
Payer: COMMERCIAL

## 2021-10-26 NOTE — TELEPHONE ENCOUNTER
Reason for Call:  Other     Detailed comments:  Nurse Practitioner, Mary Karimi with [employer] is calling ---She said this is a routine call and does not need a call back. Patient had a home visit (annual check up) with patient and she was identified as a high risk for falls.  Patient had diagnosis of peripheral artery disease in both legs. Patient was given results and instructed to call her PCP to schedule an appointment. The results and report will come to doctor office by mail.     Phone Number Patient can be reached at: Other phone number:  Mary Karimi Nurse Practitioner with [employer]/ [phone]    Best Time: any    Can we leave a detailed message on this number? YES    Call taken on 10/26/2021 at 5:57 PM by Shannon Saunders

## 2022-02-21 ENCOUNTER — TRANSFERRED RECORDS (OUTPATIENT)
Dept: HEALTH INFORMATION MANAGEMENT | Facility: CLINIC | Age: 82
End: 2022-02-21
Payer: COMMERCIAL

## 2022-02-21 LAB
ALT SERPL-CCNC: 13 U/L (ref 6–29)
AST SERPL-CCNC: 20 U/L (ref 10–35)
CREATININE (EXTERNAL): 1.03 MG/DL (ref 0.6–0.88)
GFR ESTIMATED (EXTERNAL): 51 ML/MIN/1.73M2
GFR ESTIMATED (IF AFRICAN AMERICAN) (EXTERNAL): 59 ML/MIN/1.73M2
GLUCOSE (EXTERNAL): 92 MG/DL (ref 65–99)
POTASSIUM (EXTERNAL): 4.9 MMOL/L (ref 3.5–5.3)

## 2022-03-18 ENCOUNTER — OFFICE VISIT (OUTPATIENT)
Dept: FAMILY MEDICINE | Facility: CLINIC | Age: 82
End: 2022-03-18
Payer: COMMERCIAL

## 2022-03-18 VITALS
BODY MASS INDEX: 30.56 KG/M2 | DIASTOLIC BLOOD PRESSURE: 92 MMHG | WEIGHT: 183.4 LBS | RESPIRATION RATE: 14 BRPM | SYSTOLIC BLOOD PRESSURE: 171 MMHG | TEMPERATURE: 96.4 F | HEART RATE: 83 BPM | OXYGEN SATURATION: 100 % | HEIGHT: 65 IN

## 2022-03-18 DIAGNOSIS — E66.811 CLASS 1 OBESITY WITHOUT SERIOUS COMORBIDITY WITH BODY MASS INDEX (BMI) OF 30.0 TO 30.9 IN ADULT, UNSPECIFIED OBESITY TYPE: ICD-10-CM

## 2022-03-18 DIAGNOSIS — G50.0 TRIGEMINAL NEURALGIA: ICD-10-CM

## 2022-03-18 DIAGNOSIS — Z00.00 ENCOUNTER FOR MEDICARE ANNUAL WELLNESS EXAM: Primary | ICD-10-CM

## 2022-03-18 DIAGNOSIS — R03.0 ELEVATED BLOOD PRESSURE READING WITHOUT DIAGNOSIS OF HYPERTENSION: ICD-10-CM

## 2022-03-18 DIAGNOSIS — I48.91 ATRIAL FIBRILLATION, UNSPECIFIED TYPE (H): ICD-10-CM

## 2022-03-18 PROCEDURE — 99214 OFFICE O/P EST MOD 30 MIN: CPT | Mod: 25 | Performed by: FAMILY MEDICINE

## 2022-03-18 PROCEDURE — 99397 PER PM REEVAL EST PAT 65+ YR: CPT | Performed by: FAMILY MEDICINE

## 2022-03-18 ASSESSMENT — ENCOUNTER SYMPTOMS
COUGH: 0
HEADACHES: 0
HEARTBURN: 0
MYALGIAS: 0
ABDOMINAL PAIN: 0
ARTHRALGIAS: 0
HEMATURIA: 0
PALPITATIONS: 0
SHORTNESS OF BREATH: 0
FREQUENCY: 0
DIARRHEA: 0
NAUSEA: 0
CHILLS: 0
FEVER: 0
DIZZINESS: 0
JOINT SWELLING: 0
NERVOUS/ANXIOUS: 0
WEAKNESS: 0
PARESTHESIAS: 0
DYSURIA: 0
SORE THROAT: 0
CONSTIPATION: 1
EYE PAIN: 0
HEMATOCHEZIA: 0
BREAST MASS: 0

## 2022-03-18 ASSESSMENT — PAIN SCALES - GENERAL: PAINLEVEL: NO PAIN (0)

## 2022-03-18 ASSESSMENT — ACTIVITIES OF DAILY LIVING (ADL): CURRENT_FUNCTION: NO ASSISTANCE NEEDED

## 2022-03-18 NOTE — PATIENT INSTRUCTIONS
Patient Education   Personalized Prevention Plan    Overall, you're doing well.     Your blood tests are normal.     Stay active.      Yearly check up.     You are due for the preventive services outlined below.  Your care team is available to assist you in scheduling these services.  If you have already completed any of these items, please share that information with your care team to update in your medical record.  Health Maintenance Due   Topic Date Due     Zoster (Shingles) Vaccine (1 of 2) Never done     Pneumococcal Vaccine (1 of 1 - PPSV23) Never done     PHQ-2 (once per calendar year)  01/01/2022     Annual Wellness Visit  02/11/2022     FALL RISK ASSESSMENT  02/11/2022        At your visit today, we discussed your risk for falls and preventive options.    Fall Prevention  Falls often occur due to slipping, tripping or losing your balance. Millions of people fall every year and injure themselves. Here are ways to reduce your risk of falling again.     Think about your fall, was there anything that caused your fall that can be fixed, removed, or replaced?    Make your home safe by keeping walkways clear of objects you may trip over, such as electric cords.    Use non-slip pads under rugs. Don't use area rugs or small throw rugs.    Use non-slip mats in bathtubs and showers.    Install handrails and lights on staircases. The handrails should be on both sides of the stairs.    Don't walk in poorly lit areas.    Don't stand on chairs or wobbly ladders.    Use caution when reaching overhead or looking upward. This position can cause a loss of balance.    Be sure your shoes fit properly, have non-slip bottoms and are in good condition.     Wear shoes both inside and out. Don't go barefoot or wear slippers.    Be cautious when going up and down stairs, curbs, and when walking on uneven sidewalks.    If your balance is poor, consider using a cane or walker.    If your fall was related to alcohol use, stop or limit  alcohol intake.     If your fall was related to use of sleeping medicines, talk to your healthcare provider about this. You may need to reduce your dosage at bedtime if you awaken during the night to go to the bathroom.      To reduce the need for nighttime bathroom trips:  ? Don't drink fluids for several hours before going to bed  ? Empty your bladder before going to bed  ? Men can keep a urinal at the bedside    Stay as active as you can. Balance, flexibility, strength, and endurance all come from exercise. They all play a role in preventing falls. Ask your healthcare provider which types of activity are right for you.    Get your vision checked on a regular basis.    If you have pets, know where they are before you stand up or walk so you don't trip over them.    Use night lights.    Go over all your medicines with a pharmacist or other healthcare provider to see if any of them could make you more likely to fall.  [employer] last reviewed this educational content on 4/1/2018 2000-2021 [employer]. All rights reserved. This information is not intended as a substitute for professional medical care. Always follow your healthcare professional's instructions.

## 2022-03-18 NOTE — PROGRESS NOTES
"SUBJECTIVE:   Bee Yepez is a 81 year old female who presents for Preventive Visit.      Patient has been advised of split billing requirements and indicates understanding: Yes  Are you in the first 12 months of your Medicare coverage?  No    Healthy Habits:     In general, how would you rate your overall health?  Good    Frequency of exercise:  2-3 days/week    Duration of exercise:  15-30 minutes    Do you usually eat at least 4 servings of fruit and vegetables a day, include whole grains    & fiber and avoid regularly eating high fat or \"junk\" foods?  No    Taking medications regularly:  Yes    Medication side effects:  None    Ability to successfully perform activities of daily living:  No assistance needed    Home Safety:  No safety concerns identified    Hearing Impairment:  No hearing concerns    In the past 6 months, have you been bothered by leaking of urine? Yes    In general, how would you rate your overall mental or emotional health?  Good      PHQ-2 Total Score: 0    Additional concerns today:  No    Do you feel safe in your environment? Yes    Have you ever done Advance Care Planning? (For example, a Health Directive, POLST, or a discussion with a medical provider or your loved ones about your wishes): Yes, patient states has an Advance Care Planning document and will bring a copy to the clinic.       Fall risk  Fallen 2 or more times in the past year?: Yes  Any fall with injury in the past year?: Yes  Timed Up and Go Test (>13.5 is fall risk; contact physician) : 10    Cognitive Screening   1) Repeat 3 items (Leader, Season, Table)    2) Clock draw: NORMAL  3) 3 item recall: Recalls 3 objects  Results: 3 items recalled: COGNITIVE IMPAIRMENT LESS LIKELY    Mini-CogTM Copyright RICHARD Alcantara. Licensed by the author for use in [facility]; reprinted with permission ([email or web address]). All rights reserved.      Do you have sleep apnea, excessive snoring or daytime drowsiness?: no    Reviewed and " updated as needed this visit by clinical staff   Tobacco  Allergies  Meds   Med Hx   Fam Hx  Soc Hx        Reviewed and updated as needed this visit by Provider                 Social History     Tobacco Use     Smoking status: Former Smoker     Smokeless tobacco: Never Used   Substance Use Topics     Alcohol use: No     Alcohol/week: 0.0 standard drinks     If you drink alcohol do you typically have >3 drinks per day or >7 drinks per week? No    Alcohol Use 3/18/2022   Prescreen: >3 drinks/day or >7 drinks/week? Not Applicable   Prescreen: >3 drinks/day or >7 drinks/week? -       PROBLEMS TO ADD ON...  Told patient to double check with insurance company to see if they would pay for shingles vaccine before we give to her  Cele Buitrago, CMA    None  -------------------------------------    Current providers sharing in care for this patient include: Patient Care Team:  Marisol Kamara NP as PCP - General (Family Medicine)  Care, [facility] [place] ([facility] ([place]  Marisol Kamara NP as Assigned PCP    The following health maintenance items are reviewed in Epic and correct as of today:      Pertinent mammograms are reviewed under the imaging tab.    Review of Systems   Constitutional: Negative for chills and fever.   HENT: Negative for congestion, ear pain, hearing loss and sore throat.    Eyes: Negative for pain and visual disturbance.   Respiratory: Negative for cough and shortness of breath.    Cardiovascular: Negative for chest pain, palpitations and peripheral edema.   Gastrointestinal: Positive for constipation. Negative for abdominal pain, diarrhea, heartburn, hematochezia and nausea.   Breasts:  Negative for tenderness, breast mass and discharge.   Genitourinary: Negative for dysuria, frequency, genital sores, hematuria, pelvic pain, urgency, vaginal bleeding and vaginal discharge.   Musculoskeletal: Negative for arthralgias, joint swelling and myalgias.   Skin: Negative for rash.  "  Neurological: Negative for dizziness, weakness, headaches and paresthesias.   Psychiatric/Behavioral: Negative for mood changes. The patient is not nervous/anxious.        OBJECTIVE:   BP (!) 171/92   Pulse 83   Temp (!) 96.4  F (35.8  C) (Tympanic)   Resp 14   Ht 1.644 m (5' 4.72\")   Wt 83.2 kg (183 lb 6.4 oz)   SpO2 100%   BMI 30.78 kg/m   Estimated body mass index is 30.78 kg/m  as calculated from the following:    Height as of this encounter: 1.644 m (5' 4.72\").    Weight as of this encounter: 83.2 kg (183 lb 6.4 oz).  Physical Exam  GENERAL: Healthy, alert and no distress  EYES: Eyes grossly normal to inspection, conjunctivae and sclerae normal  RESP: Lungs clear to auscultation - no rales, rhonchi or wheezes  CV: Regular rate and rhythm, normal S1 S2,   MS: No gross musculoskeletal defects noted, no edema  NEURO: Normal strength and tone, mentation intact and speech normal  PSYCH: Mentation appears normal, affect normal/bright     Diagnostic Test Results:  Labs reviewed in Epic  No results found for any visits on 03/18/22.    ASSESSMENT / PLAN:   (Z00.00) Encounter for Medicare annual wellness exam  (primary encounter diagnosis)  Comment: Reviewed the need for periodic healthcare exams and screenings.  Plan: Advise yearly physicals.    (G50.0) Trigeminal neuralgia  Comment: Current symptoms tolerable on Tegretol.  Followed by neurology.  Plan: Continue current medications and treatments.    (I48.91) Atrial fibrillation, unspecified type (H)  Comment: See problem list, one-time event precipitated by acute influenza infection.  No recurrence.  Plan: Monitor.  No indication for rate/rhythm control or anticoagulation at this time.    (R03.0) Elevated blood pressure reading without diagnosis of hypertension  Comment: Observe.  Plan: We will recheck at subsequent clinic visit.    (E66.9,  Z68.30) Class 1 obesity without serious comorbidity with body mass index (BMI) of 30.0 to 30.9 in adult, unspecified " "obesity type  Comment: Reviewed lifestyle.  Plan: Monitor.    Patient Instructions       Patient Education   Personalized Prevention Plan    Overall, you're doing well.     Your blood tests are normal.     Stay active.      Yearly check up.     Patient has been advised of split billing requirements and indicates understanding: Yes    COUNSELING:  Reviewed preventive health counseling, as reflected in patient instructions       Regular exercise       Healthy diet/nutrition       Vision screening       Hearing screening       Dental care       Bladder control       Fall risk prevention    Estimated body mass index is 30.78 kg/m  as calculated from the following:    Height as of this encounter: 1.644 m (5' 4.72\").    Weight as of this encounter: 83.2 kg (183 lb 6.4 oz).    Weight management plan: Discussed healthy diet and exercise guidelines    She reports that she has quit smoking. She has never used smokeless tobacco.      Appropriate preventive services were discussed with this patient, including applicable screening as appropriate for cardiovascular disease, diabetes, osteopenia/osteoporosis, and glaucoma.  As appropriate for age/gender, discussed screening for colorectal cancer, prostate cancer, breast cancer, and cervical cancer. Checklist reviewing preventive services available has been given to the patient.    Reviewed patients plan of care and provided an AVS. The Intermediate Care Plan ( asthma action plan, low back pain action plan, and migraine action plan) for Bee meets the Care Plan requirement. This Care Plan has been established and reviewed with the Patient.    Counseling Resources:  ATP IV Guidelines  Pooled Cohorts Equation Calculator  Breast Cancer Risk Calculator  Breast Cancer: Medication to Reduce Risk  FRAX Risk Assessment  ICSI Preventive Guidelines  Dietary Guidelines for Americans, 2010  USDA's MyPlate  ASA Prophylaxis  Lung CA Screening    Betina Pierce MD  [facility] " [place]    Identified Health Risks:

## 2022-06-10 ENCOUNTER — OFFICE VISIT (OUTPATIENT)
Dept: FAMILY MEDICINE | Facility: CLINIC | Age: 82
End: 2022-06-10
Payer: COMMERCIAL

## 2022-06-10 VITALS
DIASTOLIC BLOOD PRESSURE: 79 MMHG | TEMPERATURE: 99.7 F | SYSTOLIC BLOOD PRESSURE: 123 MMHG | WEIGHT: 180.4 LBS | RESPIRATION RATE: 20 BRPM | BODY MASS INDEX: 30.28 KG/M2 | OXYGEN SATURATION: 96 % | HEART RATE: 120 BPM

## 2022-06-10 DIAGNOSIS — Z20.822 SUSPECTED COVID-19 VIRUS INFECTION: Primary | ICD-10-CM

## 2022-06-10 DIAGNOSIS — R05.9 COUGH: ICD-10-CM

## 2022-06-10 DIAGNOSIS — J02.9 SORE THROAT: ICD-10-CM

## 2022-06-10 LAB
DEPRECATED S PYO AG THROAT QL EIA: NEGATIVE
FLUAV AG SPEC QL IA: NEGATIVE
FLUBV AG SPEC QL IA: NEGATIVE
GROUP A STREP BY PCR: NOT DETECTED

## 2022-06-10 PROCEDURE — 99213 OFFICE O/P EST LOW 20 MIN: CPT | Mod: CS | Performed by: PHYSICIAN ASSISTANT

## 2022-06-10 PROCEDURE — U0005 INFEC AGEN DETEC AMPLI PROBE: HCPCS | Performed by: PHYSICIAN ASSISTANT

## 2022-06-10 PROCEDURE — U0003 INFECTIOUS AGENT DETECTION BY NUCLEIC ACID (DNA OR RNA); SEVERE ACUTE RESPIRATORY SYNDROME CORONAVIRUS 2 (SARS-COV-2) (CORONAVIRUS DISEASE [COVID-19]), AMPLIFIED PROBE TECHNIQUE, MAKING USE OF HIGH THROUGHPUT TECHNOLOGIES AS DESCRIBED BY CMS-2020-01-R: HCPCS | Performed by: PHYSICIAN ASSISTANT

## 2022-06-10 PROCEDURE — 87804 INFLUENZA ASSAY W/OPTIC: CPT | Performed by: PHYSICIAN ASSISTANT

## 2022-06-10 PROCEDURE — 87651 STREP A DNA AMP PROBE: CPT | Performed by: PHYSICIAN ASSISTANT

## 2022-06-10 RX ORDER — AZITHROMYCIN 250 MG/1
TABLET, FILM COATED ORAL
Qty: 6 TABLET | Refills: 0 | Status: SHIPPED | OUTPATIENT
Start: 2022-06-10 | End: 2022-06-15

## 2022-06-10 ASSESSMENT — ENCOUNTER SYMPTOMS
VOMITING: 0
CHEST TIGHTNESS: 0
SORE THROAT: 1
LIGHT-HEADEDNESS: 0
DIARRHEA: 0
ACTIVITY CHANGE: 0
MYALGIAS: 1
WHEEZING: 0
ABDOMINAL PAIN: 0
ARTHRALGIAS: 0
FEVER: 1
CHILLS: 1
WEAKNESS: 1
FREQUENCY: 0
DYSURIA: 0
NAUSEA: 0
HEADACHES: 0
FATIGUE: 1
COUGH: 1
COLOR CHANGE: 0
RHINORRHEA: 1
SINUS PRESSURE: 0
STRIDOR: 0

## 2022-06-10 ASSESSMENT — PAIN SCALES - GENERAL: PAINLEVEL: EXTREME PAIN (8)

## 2022-06-10 NOTE — PATIENT INSTRUCTIONS
César Salazar,     Thank you for allowing [facility] to manage your care.      I am unsure of the cause of your symptoms, but your exam is reassuring. We will see what our workup shows. Call us if you have a positive home COVID test today.    If you develop worsening/changing symptoms at any time, please call 911 or go to the emergency department for evaluation.    I sent your prescriptions to your pharmacy.     For your pain, please use Ibuprofen 400mg four times daily with food. Between ibuprofen doses, you may use Tylenol 650mg.      Max acetaminophen (Tylenol) 3,000mg/24 hours  Max ibuprofen 2,000mg/24 hours     Drink 8-10 glasses of fluid daily to stay well-hydrated.     If you have any questions or concerns, please feel free to call us at [phone]     Sincerely,     Yoan Sommers PA-C     Did you know?       You can schedule a video visit for follow-up appointments as well as future appointments for certain conditions.  Please see the below link.      [email or web address]     If you have not already done so,  I encourage you to sign up for [email or web address] ([email or web address]).  This will allow you to review your results, securely communicate with a provider, and schedule virtual visits as well.

## 2022-06-10 NOTE — LETTER
June 12, 2022      Bee Yepez  [address]   [place] [address]        Dear ,    We are writing to inform you of your test results.    Your test results fall within the expected range(s) or remain unchanged from previous results.  Please continue with current treatment plan.    Resulted Orders   Streptococcus A Rapid Screen w/Reflex to PCR - Clinic Collect   Result Value Ref Range    Group A Strep antigen Negative Negative   Influenza A & B Antigen - Clinic Collect   Result Value Ref Range    Influenza A antigen Negative Negative    Influenza B antigen Negative Negative    Narrative    Test results must be correlated with clinical data. If necessary, results should be confirmed by a molecular assay or viral culture.   Group A Streptococcus PCR Throat Swab   Result Value Ref Range    Group A strep by PCR Not Detected Not Detected    Narrative    The Xpert Xpress Strep A test, performed on the [id number], is a rapid, qualitative in vitro diagnostic test for the detection of Streptococcus pyogenes (Group A ß-hemolytic Streptococcus, Strep A) in throat swab specimens from patients with signs and symptoms of pharyngitis. The Xpert Xpress Strep A test can be used as an aid in the diagnosis of Group A Streptococcal pharyngitis. The assay is not intended to monitor treatment for Group A Streptococcus infections. The Xpert Xpress Strep A test utilizes an automated real-time polymerase chain reaction (PCR) to detect Streptococcus pyogenes DNA.   Symptomatic; Yes; 6/6/2022 COVID-19 Virus (Coronavirus) by PCR Nose   Result Value Ref Range    SARS CoV2 PCR Negative Negative, Testing sent to reference lab. Results will be returned via unsolicited result      Comment:      NEGATIVE: SARS-CoV-2 (COVID-19) RNA not detected, presumed negative.    Narrative    Testing was performed using the Aptima SARS-CoV-2 Assay on the  [employer] Instrument System. Additional information about this  Emergency  Use Authorization (EUA) assay can be found via the Lab  Guide. This test should be ordered for the detection of SARS-CoV-2 in  individuals who meet SARS-CoV-2 clinical and/or epidemiological  criteria. Test performance is unknown in asymptomatic patients. This  test is for in vitro diagnostic use under the FDA EUA for  laboratories certified under CLIA to perform high complexity testing.  This test has not been FDA cleared or approved. A negative result  does not rule out the presence of PCR inhibitors in the specimen or  target RNA in concentration below the limit of detection for the  assay. The possibility of a false negative should be considered if  the patient's recent exposure or clinical presentation suggests  COVID-19. This test was validated by the [facility]. This laboratory is certified under  the Clinical Laboratory Improvement Amendments of 1988 (CLIA-88) as  qualified to perform high complexity laboratory testing.       If you have any questions or concerns, please call the clinic at the number listed above.       Sincerely,      JUANA Blevins

## 2022-06-10 NOTE — PROGRESS NOTES
Assessment & Plan   1. Suspected COVID-19 virus infection: strep antigen negative and influenza A&B negative. Strong suspicion for COVID-19 infection based on HPI, ROS, and physical exam. Home COVID test negative on 6/8/22. COVID PCR completed in clinic. Instructed patient to complete home COVID test today as antiviral therapy is time sensitive at this time due to being on day 5 of symptoms.  - Symptomatic; Yes; 6/6/2022 COVID-19 Virus (Coronavirus) by PCR Nose    2. Cough:  Azithromycin prescribed for empiric coverage for atypical bacterial upper respiratory illnesses including pneumonia and sinus infection.   - Symptomatic; Yes; 6/6/2022 COVID-19 Virus (Coronavirus) by PCR Nose  - azithromycin (ZITHROMAX) 250 MG tablet; Take 2 tablets (500 mg) by mouth daily for 1 day, THEN 1 tablet (250 mg) daily for 4 days.  Dispense: 6 tablet; Refill: 0    3. Sore throat  - Symptomatic; Yes; 6/6/2022 COVID-19 Virus (Coronavirus) by PCR Nose    Appears well and non-toxic and I have low suspicion for systemic illness, impending airway obstruction or respiratory distress. Follow up in 1-2 weeks prn.  Complete history and physical exam as below. AF with normal VS.    DDx and Dx discussed with and explained to the pt to their satisfaction.  All questions were answered at this time. Pt expressed understanding of and agreement with this dx, tx, and plan. No further workup warranted and standard medication warnings given. I have given the patient a list of pertinent indications for re-evaluation. Will go to the Emergency Department if symptoms worsen or new concerning symptoms arise. Patient left in no apparent distress.      See Patient Instructions    Return in about 1 week (around 6/17/2022) for a recheck of your symptoms if not improving, or call 911/go to an ER anytime if worsening.    JUANA Blevins  [facility] TYLOR Gamboa is a 81 year old who presents for the following health issues.      HPI     Presents with concerns of sore throat that started on Monday. Fever, fatigue, and body aches to follow. Cough started yesterday with nasal drainage. Home COVID test on Wednesday (6/8/22) was negative.     Acute Illness  Acute illness concerns: Sore throat, body aches, fever, head cold  Onset/Duration: 6/6/22  Symptoms:  Fever: YES- 100.4  Chills/Sweats: no  Headache (location?): YES- head cold  Sinus Pressure: no  Conjunctivitis:  no  Ear Pain: no  Rhinorrhea: no  Congestion: no  Sore Throat: YES- 5 days  Cough: no  Wheeze: no  Decreased Appetite: no  Nausea: no  Vomiting: no  Diarrhea: no  Dysuria/Freq.: no  Dysuria or Hematuria: no  Fatigue/Achiness: YES- body aches  Sick/Strep Exposure: no  Therapies tried and outcome: cold and flu medication OTC, zinc lozenges    Review of Systems   Constitutional: Positive for chills, fatigue and fever. Negative for activity change.   HENT: Positive for rhinorrhea and sore throat. Negative for congestion, ear pain, postnasal drip and sinus pressure.    Respiratory: Positive for cough. Negative for chest tightness, wheezing and stridor.    Cardiovascular: Negative for chest pain.   Gastrointestinal: Negative for abdominal pain, diarrhea, nausea and vomiting.   Genitourinary: Negative for dysuria, frequency and urgency.   Musculoskeletal: Positive for myalgias. Negative for arthralgias.   Skin: Negative for color change.   Neurological: Positive for weakness. Negative for light-headedness and headaches.          Objective    /79   Pulse 120   Temp 99.7  F (37.6  C) (Tympanic)   Resp 20   Wt 81.8 kg (180 lb 6.4 oz)   SpO2 96%   BMI 30.28 kg/m      Physical Exam  Constitutional:       General: She is not in acute distress.     Appearance: Normal appearance. She is well-groomed. She is not ill-appearing.   HENT:      Head: Normocephalic and atraumatic.      Right Ear: Tympanic membrane, ear canal and external ear normal.      Left Ear: Tympanic membrane and  ear canal normal.      Mouth/Throat:      Pharynx: Oropharynx is clear. No pharyngeal swelling, oropharyngeal exudate or posterior oropharyngeal erythema.      Tonsils: No tonsillar exudate.   Cardiovascular:      Rate and Rhythm: Normal rate and regular rhythm.      Heart sounds: Normal heart sounds, S1 normal and S2 normal. Heart sounds not distant. No murmur heard.    No friction rub. No gallop. No S3 or S4 sounds.   Pulmonary:      Effort: Pulmonary effort is normal. No respiratory distress.      Breath sounds: Normal breath sounds. No stridor. No decreased breath sounds, wheezing, rhonchi or rales.   Musculoskeletal:      Cervical back: Full passive range of motion without pain, normal range of motion and neck supple.   Lymphadenopathy:      Head:      Right side of head: No submental, submandibular or tonsillar adenopathy.      Left side of head: No submental, submandibular or tonsillar adenopathy.      Cervical: No cervical adenopathy.   Skin:     General: Skin is warm and dry.      Capillary Refill: Capillary refill takes 2 to 3 seconds.   Neurological:      Mental Status: She is alert.   Psychiatric:         Attention and Perception: Attention normal.         Mood and Affect: Mood and affect normal.          Results for orders placed or performed in visit on 06/10/22   Symptomatic; Yes; 6/6/2022 COVID-19 Virus (Coronavirus) by PCR Nose     Status: Normal    Specimen: Nose; Swab   Result Value Ref Range    SARS CoV2 PCR Negative Negative, Testing sent to reference lab. Results will be returned via unsolicited result    Narrative    Testing was performed using the Aptima SARS-CoV-2 Assay on the  [employer] Instrument System. Additional information about this  Emergency Use Authorization (EUA) assay can be found via the Lab  Guide. This test should be ordered for the detection of SARS-CoV-2 in  individuals who meet SARS-CoV-2 clinical and/or epidemiological  criteria. Test performance is unknown in asymptomatic  patients. This  test is for in vitro diagnostic use under the FDA EUA for  laboratories certified under CLIA to perform high complexity testing.  This test has not been FDA cleared or approved. A negative result  does not rule out the presence of PCR inhibitors in the specimen or  target RNA in concentration below the limit of detection for the  assay. The possibility of a false negative should be considered if  the patient's recent exposure or clinical presentation suggests  COVID-19. This test was validated by the [facility]. This laboratory is certified under  the Clinical Laboratory Improvement Amendments of 1988 (CLIA-88) as  qualified to perform high complexity laboratory testing.   Streptococcus A Rapid Screen w/Reflex to PCR - Clinic Collect     Status: Normal    Specimen: Throat; Swab   Result Value Ref Range    Group A Strep antigen Negative Negative   Influenza A & B Antigen - Clinic Collect     Status: Normal    Specimen: Nose; Swab   Result Value Ref Range    Influenza A antigen Negative Negative    Influenza B antigen Negative Negative    Narrative    Test results must be correlated with clinical data. If necessary, results should be confirmed by a molecular assay or viral culture.   Group A Streptococcus PCR Throat Swab     Status: Normal    Specimen: Throat; Swab   Result Value Ref Range    Group A strep by PCR Not Detected Not Detected    Narrative    The Xpert Xpress Strep A test, performed on the [employer], is a rapid, qualitative in vitro diagnostic test for the detection of Streptococcus pyogenes (Group A ß-hemolytic Streptococcus, Strep A) in throat swab specimens from patients with signs and symptoms of pharyngitis. The Xpert Xpress Strep A test can be used as an aid in the diagnosis of Group A Streptococcal pharyngitis. The assay is not intended to monitor treatment for Group A Streptococcus infections. The Xpert Xpress  Strep A test utilizes an automated real-time polymerase chain reaction (PCR) to detect Streptococcus pyogenes DNA.

## 2022-06-11 LAB — SARS-COV-2 RNA RESP QL NAA+PROBE: NEGATIVE

## 2022-08-01 ENCOUNTER — OFFICE VISIT (OUTPATIENT)
Dept: FAMILY MEDICINE | Facility: CLINIC | Age: 82
End: 2022-08-01
Payer: COMMERCIAL

## 2022-08-01 VITALS
BODY MASS INDEX: 29.71 KG/M2 | TEMPERATURE: 96.9 F | RESPIRATION RATE: 16 BRPM | OXYGEN SATURATION: 97 % | HEART RATE: 89 BPM | DIASTOLIC BLOOD PRESSURE: 70 MMHG | SYSTOLIC BLOOD PRESSURE: 138 MMHG | WEIGHT: 177 LBS

## 2022-08-01 DIAGNOSIS — M54.50 ACUTE BILATERAL LOW BACK PAIN WITHOUT SCIATICA: ICD-10-CM

## 2022-08-01 DIAGNOSIS — R35.0 URINARY FREQUENCY: Primary | ICD-10-CM

## 2022-08-01 LAB
ALBUMIN UR-MCNC: NEGATIVE MG/DL
APPEARANCE UR: CLEAR
BACTERIA #/AREA URNS HPF: ABNORMAL /HPF
BILIRUB UR QL STRIP: NEGATIVE
COLOR UR AUTO: YELLOW
GLUCOSE UR STRIP-MCNC: NEGATIVE MG/DL
HGB UR QL STRIP: ABNORMAL
KETONES UR STRIP-MCNC: NEGATIVE MG/DL
LEUKOCYTE ESTERASE UR QL STRIP: NEGATIVE
NITRATE UR QL: NEGATIVE
PH UR STRIP: 6.5 [PH] (ref 5–7)
RBC #/AREA URNS AUTO: ABNORMAL /HPF
SP GR UR STRIP: 1.01 (ref 1–1.03)
SQUAMOUS #/AREA URNS AUTO: ABNORMAL /LPF
UROBILINOGEN UR STRIP-ACNC: 0.2 E.U./DL
WBC #/AREA URNS AUTO: ABNORMAL /HPF

## 2022-08-01 PROCEDURE — 87086 URINE CULTURE/COLONY COUNT: CPT | Performed by: PHYSICIAN ASSISTANT

## 2022-08-01 PROCEDURE — 81001 URINALYSIS AUTO W/SCOPE: CPT | Performed by: PHYSICIAN ASSISTANT

## 2022-08-01 PROCEDURE — 99214 OFFICE O/P EST MOD 30 MIN: CPT | Performed by: PHYSICIAN ASSISTANT

## 2022-08-01 RX ORDER — SENNOSIDES A AND B 8.6 MG/1
1 TABLET, FILM COATED ORAL DAILY
COMMUNITY
End: 2023-07-13

## 2022-08-01 RX ORDER — LIDOCAINE 4 G/G
1 PATCH TOPICAL EVERY 24 HOURS
COMMUNITY
End: 2023-07-13

## 2022-08-01 RX ORDER — METHOCARBAMOL 500 MG/1
250 TABLET, FILM COATED ORAL 4 TIMES DAILY PRN
COMMUNITY
End: 2022-08-11

## 2022-08-01 RX ORDER — POLYETHYLENE GLYCOL 3350 17 G/17G
1 POWDER, FOR SOLUTION ORAL DAILY
COMMUNITY
End: 2023-10-25

## 2022-08-01 RX ORDER — OXYCODONE HYDROCHLORIDE 5 MG/1
5 TABLET ORAL EVERY 6 HOURS PRN
COMMUNITY
End: 2022-08-11

## 2022-08-01 RX ORDER — CEPHALEXIN 500 MG/1
500 CAPSULE ORAL 2 TIMES DAILY
Qty: 10 CAPSULE | Refills: 0 | Status: SHIPPED | OUTPATIENT
Start: 2022-08-01 | End: 2022-08-06

## 2022-08-01 RX ORDER — ACETAMINOPHEN 500 MG
500-1000 TABLET ORAL EVERY 6 HOURS PRN
COMMUNITY

## 2022-08-01 RX ORDER — SULFAMETHOXAZOLE/TRIMETHOPRIM 800-160 MG
1 TABLET ORAL 2 TIMES DAILY
COMMUNITY
End: 2023-07-13

## 2022-08-01 ASSESSMENT — PAIN SCALES - GENERAL: PAINLEVEL: EXTREME PAIN (8)

## 2022-08-01 NOTE — PATIENT INSTRUCTIONS
César Gamboa,    Thank you for allowing [facility] to manage your care.    Switch from Bactrim to cephalexin for treatment of the urinary tract infection.    If you develop worsening/changing symptoms at any time, please call 911 or go to the emergency department for evaluation.    I sent your prescriptions to your pharmacy.    I made a urology referral. Please call the specialty number on your after visit summary.     Please allow 1-2 business days for our office to contact you in regards to your laboratory/radiological studies.  If not done so, I encourage you to login into [employer] ([email or web address] to review your results as well.     For your pain, please use Tylenol 500mg every 4 hours.   Max acetaminophen (Tylenol) 3,000mg/24 hours    If you have any questions or concerns, please feel free to call us at [phone]    Sincerely,    Yoan Sommers PA-C    Did you know?      You can schedule a video visit for follow-up appointments as well as future appointments for certain conditions.  Please see the below link.     [email or web address]    If you have not already done so,  I encourage you to sign up for [employer] ([email or web address]).  This will allow you to review your results, securely communicate with a provider, and schedule virtual visits as well.

## 2022-08-01 NOTE — PROGRESS NOTES
Assessment & Plan   Problem List Items Addressed This Visit    None     Visit Diagnoses     Urinary frequency    -  Primary    Relevant Orders    UA Macro with Reflex to Micro and Culture - lab collect (Completed)    Adult Urology  Referral    Urine Microscopic (Completed)    Urine Culture Aerobic Bacterial - lab collect (Completed)    Acute bilateral low back pain without sciatica        Relevant Medications    aspirin (ASA) 325 MG EC tablet    oxyCODONE (ROXICODONE) 5 MG tablet    methocarbamol (ROBAXIN) 500 MG tablet    acetaminophen (TYLENOL) 500 MG tablet    diclofenac (VOLTAREN) 1 % topical gel         Patient improving overall at home post discharge. Sister is a retired nurse and helping out. Has home care set up and mobility is slowly improving after pelvic fractures managed conservatively. Ongoing polyuria despite Bactrim course. Will switch to cephalexin based on past UC results. UA equivocal with UC pending, though UTI could partially be masked by Bactrim. Will refer to urology for input.    Complete history and physical exam as below. AF with normal VS.    DDx and Dx discussed with and explained to the pt to their satisfaction.  All questions were answered at this time. Pt expressed understanding of and agreement with this dx, tx, and plan. No further workup warranted and standard medication warnings given. I have given the patient a list of pertinent indications for re-evaluation. Will go to the Emergency Department if symptoms worsen or new concerning symptoms arise. Patient left in no apparent distress.     Ordering of each unique test  Prescription drug management  36 minutes spent on the date of the encounter doing chart review, history and exam, documentation and further activities per the note     See Patient Instructions    Return in about 2 weeks (around 8/15/2022) for a recheck with your primary care provider, or call 911/go to an ER anytime if worsening.    JUANA Blevins  [facility] TYLOR Gamboa is a 81 year old presenting for the following health issues:  Hospital F/U      [facility]       Hospital Follow-up Visit:    Hospital/Nursing Home/IP Rehab Facility: [facility]  Date of Admission: 7/9/22  Date of Discharge: 7/15/22  Reason(s) for Admission: Fall    Was your hospitalization related to COVID-19? No   Problems taking medications regularly:  None  Medication changes since discharge: None  Problems adhering to non-medication therapy:  None    Pressure and frequency of urination. Just discharged from [facility] 3 days ago. On Bactrim currently for UTI for 2 more days  Summary of hospitalization:  CareEverywhere information obtained and reviewed  Diagnostic Tests/Treatments reviewed.  Follow up needed:  With urology  Other Healthcare Providers Involved in Patient s Care:         Homecare, Physical Therapy and OT  Update since discharge: fluctuating course.     Plan of care communicated with patient, family and caregiver     Review of Systems   Constitutional, HEENT, cardiovascular, pulmonary, gi and gu systems are negative, except as otherwise noted.      Objective    /70   Pulse 89   Temp 96.9  F (36.1  C) (Tympanic)   Resp 16   Wt 80.3 kg (177 lb)   SpO2 97%   BMI 29.71 kg/m    Body mass index is 29.71 kg/m .  Physical Exam  Vitals and nursing note reviewed.   Constitutional:       General: She is not in acute distress.     Appearance: She is not ill-appearing or diaphoretic.   HENT:      Head: Normocephalic and atraumatic.      Mouth/Throat:      Mouth: Mucous membranes are moist.   Eyes:      Conjunctiva/sclera: Conjunctivae normal.   Cardiovascular:      Rate and Rhythm: Normal rate and regular rhythm.      Heart sounds: Normal heart sounds. No murmur heard.    No friction rub. No gallop.   Pulmonary:      Effort: Pulmonary effort is normal. No respiratory distress.      Breath sounds: Normal breath sounds. No  stridor. No wheezing, rhonchi or rales.   Abdominal:      General: Bowel sounds are normal. There is no distension.      Palpations: Abdomen is soft. There is no mass.      Tenderness: There is no abdominal tenderness. There is no right CVA tenderness, left CVA tenderness, guarding or rebound.      Hernia: No hernia is present.   Skin:     General: Skin is warm and dry.   Neurological:      General: No focal deficit present.      Mental Status: She is alert. Mental status is at baseline.   Psychiatric:         Mood and Affect: Mood normal.         Behavior: Behavior normal.          Results for orders placed or performed in visit on 08/01/22   UA Macro with Reflex to Micro and Culture - lab collect     Status: Abnormal    Specimen: Urine, Midstream   Result Value Ref Range    Color Urine Yellow Colorless, Straw, Light Yellow, Yellow    Appearance Urine Clear Clear    Glucose Urine Negative Negative mg/dL    Bilirubin Urine Negative Negative    Ketones Urine Negative Negative mg/dL    Specific Gravity Urine 1.010 1.003 - 1.035    Blood Urine Trace (A) Negative    pH Urine 6.5 5.0 - 7.0    Protein Albumin Urine Negative Negative mg/dL    Urobilinogen Urine 0.2 0.2, 1.0 E.U./dL    Nitrite Urine Negative Negative    Leukocyte Esterase Urine Negative Negative   Urine Microscopic     Status: Abnormal   Result Value Ref Range    Bacteria Urine None Seen None Seen /HPF    RBC Urine 0-2 0-2 /HPF /HPF    WBC Urine 0-5 0-5 /HPF /HPF    Squamous Epithelials Urine Few (A) None Seen /LPF    Narrative    Urine Culture not indicated   Urine Culture Aerobic Bacterial - lab collect     Status: None    Specimen: Urine, Midstream   Result Value Ref Range    Culture No Growth        .  ..

## 2022-08-03 ENCOUNTER — TELEPHONE (OUTPATIENT)
Dept: FAMILY MEDICINE | Facility: CLINIC | Age: 82
End: 2022-08-03

## 2022-08-03 LAB — BACTERIA UR CULT: NO GROWTH

## 2022-08-03 NOTE — TELEPHONE ENCOUNTER
Geovanni calling to confirm [facility] will follow patient for home care. Ok to leave a detailed message.

## 2022-08-05 ENCOUNTER — TELEPHONE (OUTPATIENT)
Dept: FAMILY MEDICINE | Facility: CLINIC | Age: 82
End: 2022-08-05

## 2022-08-05 ENCOUNTER — OFFICE VISIT (OUTPATIENT)
Dept: UROLOGY | Facility: CLINIC | Age: 82
End: 2022-08-05
Attending: PHYSICIAN ASSISTANT
Payer: COMMERCIAL

## 2022-08-05 DIAGNOSIS — R35.1 NOCTURIA: Primary | ICD-10-CM

## 2022-08-05 DIAGNOSIS — R33.9 INCOMPLETE BLADDER EMPTYING: ICD-10-CM

## 2022-08-05 PROCEDURE — 51798 US URINE CAPACITY MEASURE: CPT | Performed by: PHYSICIAN ASSISTANT

## 2022-08-05 PROCEDURE — 99205 OFFICE O/P NEW HI 60 MIN: CPT | Mod: 25 | Performed by: PHYSICIAN ASSISTANT

## 2022-08-05 RX ORDER — MULTIVITAMIN WITH IRON
1 TABLET ORAL 2 TIMES DAILY
COMMUNITY

## 2022-08-05 RX ORDER — LIDOCAINE 4 G/G
2 PATCH TOPICAL
COMMUNITY
Start: 2022-07-14 | End: 2023-07-13

## 2022-08-05 NOTE — LETTER
Patient:  Bee Yepez  :   1940  MRN:     [id number]        Ms.Sharon KINGSLEY Yepez  [address]  [facility] [address]        2022    Dear Ms.Lucho,    At your appointment earlier with Chetna Farias PA-C it was discussed that you will need to schedule a Urodynamics appointment at the [place]. We have mailed you a 3 day bladder diary to your address on file. Please fill this out and bring it with you to your Urodynamics appointment. This does not have to be a consecutive 3 days. To measure output you can  a urine collection pan at your local pharmacy.    Thank you,  Your Urology Care Team

## 2022-08-05 NOTE — PROGRESS NOTES
Bee Yepez's goals for this visit include:   Chief Complaint   Patient presents with     New Patient     Urinary frequency,       She requests these members of her care team be copied on today's visit information:     PCP: Marisol Sandhu    Referring Provider:  JUANA Blevins  [id number] [place] [id number]    post void residual: 250 ml      Do you need any medication refills at today's visit?     Aure Mcdaniel, KARRIE on 8/5/2022 at 1:43 PM

## 2022-08-05 NOTE — TELEPHONE ENCOUNTER
Please call patient with the following unread Revstr[employer] message. Thanks.      Dear Bee,      Your urine culture did not grow a urinary tract infection. That said, if you are feeling improved on the cephalexin, please finish the course. If you are not feeling improved, please discontinue it and be seen again in clinic or urgent care for re-evaluation.     Please let us know if you have any questions or concerns.     Regards,  JUANA Blevins

## 2022-08-05 NOTE — PATIENT INSTRUCTIONS
UROLOGY CLINIC VISIT PATIENT INSTRUCTIONS    Schedule urodynamics testing with Chetna Farias PA-C next available.     If you continue to have trouble emptying your bladder with frequent nighttime urination into next week and would like to return for a nurse visit to learn self catheterization, call us at the number below.     URODYNAMIC TESTING    Where should I go for this test?  The procedure is performed at:     [facility]   [address] [place] [address]   Floor 4    If you have questions about your test, please call our nurse triage line at [phone], option #2. If you need to cancel or reschedule your test for any reason, please notify us as soon as possible.    Please check in approximately 15 minutes prior to your procedure time.      What is urodynamic testing?   Urodynamic testing refers to a group of tests used to assess bladder function by measuring various aspects of urine storage and emptying. The test takes about 75 minutes. For most patients, the test is not painful.     How should I get ready for this test?  Please try to arrive with a comfortably full bladder if possible because you will be asked to try and urinate prior to your study.  If you received a bladder diary, please complete this prior to your urodynamic test and bring it with you to your appointment. A bladder diary measures how much fluid you are drinking and how often and how much you are urinating. You can also record any urinary leakage that may have occurred and what you were doing when you leaked.    If you have chronic constipation, please take stool softeners for two days before your test.    What happens during the test?  You will first be asked to empty your bladder in a private restroom into a special toilet called a uroflow machine which measures the rate of urine flow.  A nurse will then place a very small tube (called a catheter) into your bladder. This  drains any urine left over after urinating and also measures the pressures inside of your bladder during your test. Another small catheter will then be placed into your rectum to measure abdominal pressures. Two small sticky patches will be placed on the skin near your anus to measure pelvic floor function.   We will then instill contrast dye into your bladder through the bladder catheter. The contrast is very dense and will allow us to take x-ray pictures of your bladder intermittently during your test.  You will be asked to tell us when you first start to feel like your bladder is filling up, when you have moderate urgency to urinate, when you have very strong urgency to urinate and finally when you feel that your bladder is full. Once you feel full you will be asked to try and urinate.    You may be asked to cough or bear down several times during your procedure. The provider running your study will be looking for urine leakage during this time.      What happens after the test?  The provider running your urodynamic study will share the results of your test on the day of your procedure or very soon after.  After your test, you may go about your day as normal. You may notice some blood in your urine for a couple of days which should clear up on its own. You may also feel a more urgent need to use the toilet or you may need to go more often - this is due to having a catheter placed and should resolve on its own in a few days.      If you have any issues, questions or concerns in the meantime, do not hesitate to contact us at [phone] or via [employer].     It was a pleasure meeting with you today.  Thank you for allowing me and my team the privilege of caring for you today.  YOU are the reason we are here, and I truly hope we provided you with the excellent service you deserve.  Please let us know if there is anything else we can do for you so that we can be sure you are leaving completely satisfied with your care  experience.

## 2022-08-05 NOTE — TELEPHONE ENCOUNTER
Notified patient of the message below.  She stated that she is feeling better, and will finish the course.  If symptoms come back. She will follow up in clinic, or UC.    Patient stated understanding and agreeable with the plan of care.     Elin SALCEDO,BSN  Triage Nurse  [facility]: [facility]

## 2022-08-05 NOTE — PROGRESS NOTES
Name: Bee Yepez    MRN: [id number]   YOB: 1940                 Chief Complaint:   Nocturia         Assessment and Plan:   81 year old female with new onset nocturia, straining to void, and incomplete bladder emptying following a fall with associated pelvic fracture which was managed conservatively. Had Hua catheter while hospitalized, subsequently removed at TCU. She is now back home. PVR today slightly elevated at 250 mL This incomplete emptying may be contributing to nighttime urinary frequency. We discussed management options to include CIC, Hua catheter, and/or further evaluation with urodynamics. At this time, she is hesitant to proceed with catheterization due to increased infection risk and prefers to carefully observe symptoms over the next week as she has noticed some improvement in the last few days. Her sister is a retired nurse and has offered to complete intermittent straight catheterization if needed. The shared decision was therefore made to proceed as follows:  -Monitor symptoms over the weekend. If still having significant difficulty urinating with frequent nighttime urination next week, they can call to schedule a nurse visit for [employer] teaching. Would use 14 Fr straight catheter. She can then perform this once daily at bedtime and an order for supplies can be created.  -Will schedule urodynamic testing in the event that symptoms do not improve.   -Continue to elevate legs, particularly before bed.  -Limit fluids before bed.  -Timed voiding during the day.    Chetna Farias PA-C  August 5, 2022          History of Present Illness:   Bee Yepez is an 81 year old female seen in consultation from JUANA Hunt for evaluation of nocturia. She was recently hospitalized at [facility] after sustaining a fall with associated pelvic fracture (left sacral ala, right pubic bone, and pubic ramus bilaterally), managed conservatively. While admitted, she was found to  have urinary retention and had Hua catheter placed. Discharged to skilled nursing facility on 7/15/22 with Hua catheter in place. Hua was subsequently removed but she has had urinary frequency and urgency since then. Treated for a possible UTI 7/26/22 (UA with leukocytes and nitrites; unclear if urine culture done but no results available to review). Repeat UA/UC 4 days ago on 8/1/22 were negative.    Today, her main concern is nocturia q2 hours at night. This has been an issue because she is unable to ambulate independently and requires assistance from her sister. She has urgency to urinate but denies incontinence. She actually feels that these symptoms have improved slightly over the last few days. She has been doing chiropractor adjustments for her spine following her fall / pelvic fracture, and wonders if this may be helping to relieve pressure on the nerves controlling the bladder.     Notably, she did not have issues with nocturia prior to her recent fall.     She currently denies daytime urinary frequency. Also denies dysuria or gross hematuria. She has no prior history of frequent UTIs.     Sister reports that she snores but no apneic episodes witnessed.    She has mild swelling in her ankles and tries to sit with her feet elevated as much as possible.    Another change following her fall is that she now has to push and strain to empty her bladder. If she takes her time, she does feel to empty.         Past Medical History:     Past Medical History:   Diagnosis Date     Ex-smoker     3 PPD for 25 years ago, quit in 1985.     Family history of breast cancer     sister at age 45     Family history of colon cancer     sister in her 60s     Osteopenia     per patient report.            Past Surgical History:     Past Surgical History:   Procedure Laterality Date     CHOLECYSTECTOMY      in the 1970s     TUBAL LIGATION              Social History:     Social History     Tobacco Use     Smoking status:  Former Smoker     Smokeless tobacco: Never Used   Substance Use Topics     Alcohol use: No     Alcohol/week: 0.0 standard drinks            Family History:     Family History   Problem Relation Age of Onset     Coronary Artery Disease Father         had a pacemaker     Breast Cancer Sister          at age 45     Colon Cancer Sister         Dx in her 60s     Lung Cancer Sister         Dx in her 60s     Diabetes No family hx of             Allergies:   No Known Allergies         Medications:     Current Outpatient Medications   Medication Sig     acetaminophen (TYLENOL) 500 MG tablet Take 500-1,000 mg by mouth every 6 hours as needed for mild pain     aspirin (ASA) 325 MG EC tablet Take 325 mg by mouth every 6 hours as needed for moderate pain     calcium carbonate-vitamin D 600-400 MG-UNIT CHEW Take 1 chew tab by mouth 2 times daily     carBAMazepine (TEGRETOL XR) 200 MG 12 hr tablet TAKE ONE TABLET BY MOUTH TWICE DAILY     cephALEXin (KEFLEX) 500 MG capsule Take 1 capsule (500 mg) by mouth 2 times daily for 5 days     Cyanocobalamin (B-12 PO)      diclofenac (VOLTAREN) 1 % topical gel Apply 2 g topically 4 times daily as needed for moderate pain     Lidocaine (LIDOCARE) 4 % Patch Place 1 patch onto the skin every 24 hours To prevent lidocaine toxicity, patient should be patch free for 12 hrs daily.     methocarbamol (ROBAXIN) 500 MG tablet Take 250 mg by mouth 4 times daily as needed for muscle spasms     oxyCODONE (ROXICODONE) 5 MG tablet Take 5 mg by mouth every 6 hours as needed for severe pain     polyethylene glycol (MIRALAX) 17 g packet Take 1 packet by mouth daily     senna (SENOKOT) 8.6 MG tablet Take 1 tablet by mouth daily     sulfamethoxazole-trimethoprim (BACTRIM DS) 800-160 MG tablet Take 1 tablet by mouth 2 times daily     triamcinolone (KENALOG) 0.1 % external ointment Apply topically 2 times daily (Patient not taking: No sig reported)     No current facility-administered medications for this  visit.             Review of Systems:    ROS: 14 point ROS neg other than the symptoms noted above in the HPI and PMH.          Physical Exam:   There were no vitals taken for this visit.  General: age-appropriate appearing female in NAD.  HEENT: Head AT/NC, EOMI, CN Grossly intact  Resp: no respiratory distress.  : deferred.  LE: 1+ edema.   Neuro: grossly intact  Motor: excellent strength throughout  Skin: clear of rashes or ecchymoses.     PVR: 250 mL by bladder scan       Labs:      Color Urine (no units)   Date Value   08/01/2022 Yellow   06/09/2020 Yellow     Appearance Urine (no units)   Date Value   08/01/2022 Clear   06/09/2020 Slightly Cloudy     Glucose Urine (mg/dL)   Date Value   08/01/2022 Negative   06/09/2020 Negative     Bilirubin Urine (no units)   Date Value   08/01/2022 Negative   06/09/2020 Negative     Ketones Urine (mg/dL)   Date Value   08/01/2022 Negative   06/09/2020 Negative     Specific Gravity Urine (no units)   Date Value   08/01/2022 1.010   06/09/2020 1.020     pH Urine   Date Value   08/01/2022 6.5   06/09/2020 5.0 pH     Protein Albumin Urine (mg/dL)   Date Value   08/01/2022 Negative   06/09/2020 Negative     Urobilinogen Urine   Date Value   08/01/2022 0.2 E.U./dL   06/09/2020 0.2 EU/dL     Nitrite Urine (no units)   Date Value   08/01/2022 Negative   06/09/2020 Negative     Leukocyte Esterase Urine (no units)   Date Value   08/01/2022 Negative   06/09/2020 Trace (A)       Lab Results   Component Value Date    CULTURE No Growth 08/01/2022       Creatinine   Date Value Ref Range Status   06/09/2020 0.87 0.52 - 1.04 mg/dL Final           Imaging:    No recent  imaging.      Outside records:    I spent 10 minutes reviewing outside records from [facility] and [facility].         60 minutes spent on the date of the encounter doing chart review, review of outside records, review of test results, interpretation of tests, patient visit, documentation and discussion with family

## 2022-08-05 NOTE — TELEPHONE ENCOUNTER
Looks like the course of cephalexin would be completed tomorrow.    Patient was seen on 8/1/22 for hosp follow up.    Attempted to call patient at home/mobile number, no answer, left message on voicemail; patient was instructed to return call to [facility] at [phone].    Cele Biggs RN  [facility]

## 2022-08-12 ENCOUNTER — TELEPHONE (OUTPATIENT)
Dept: FAMILY MEDICINE | Facility: CLINIC | Age: 82
End: 2022-08-12

## 2022-08-12 ENCOUNTER — MYC REFILL (OUTPATIENT)
Dept: FAMILY MEDICINE | Facility: CLINIC | Age: 82
End: 2022-08-12

## 2022-08-12 DIAGNOSIS — M54.50 ACUTE BILATERAL LOW BACK PAIN WITHOUT SCIATICA: ICD-10-CM

## 2022-08-12 PROCEDURE — G0180 MD CERTIFICATION HHA PATIENT: HCPCS | Performed by: NURSE PRACTITIONER

## 2022-08-12 NOTE — TELEPHONE ENCOUNTER
Orders approved per [facility] standing orders/policy for Home Care, Assisted Living or Nursing Home Evaluations and Treatments.  Notified Jose that the orders below are ok, per above standing orders.    Elin RN,BSN  Triage Nurse  [facility]: [facility]  Ph: [phone]

## 2022-08-12 NOTE — TELEPHONE ENCOUNTER
"Voltaren gel refill requested by patient via [employer].   I see 100 grams, no refills was sent to [facility] on 8/1/22    I called pharmacy, it is possible she is out as the dosage is 2 grams 4 times a day so 100 grams is 12 day supply.    See plan per 8/1/22 visit with nikia Del Cid follow up for fall.    She is scheduled for follow up on 8/31/22 with Dr. Murillo.    Requested Prescriptions   Pending Prescriptions Disp Refills     diclofenac (VOLTAREN) 1 % topical gel 100 g 0     Sig: Apply 2 g topically 4 times daily as needed for moderate pain       Topical Steroids and Nonsteroidals Protocol Passed - 8/12/2022  6:27 AM        Passed - Patient is age 6 or older        Passed - Authorizing prescriber's most recent note related to this medication read.     If refill request is for ophthalmic use, please forward request to provider for approval.          Passed - High potency steroid not ordered        Passed - Recent (12 mo) or future (30 days) visit within the authorizing provider's specialty     Patient has had an office visit with the authorizing provider or a provider within the authorizing providers department within the previous 12 mos or has a future within next 30 days. See \"Patient Info\" tab in inbasket, or \"Choose Columns\" in Meds & Orders section of the refill encounter.              Passed - Medication is active on med list           Meets protocol for RN refill.    Prescription approved per [facility] Refill Protocol.    Cele Biggs RN  [facility]          "

## 2022-08-12 NOTE — TELEPHONE ENCOUNTER
Jose calling to request verbal orders for HHA 1x a week for 7 weeks for showering.    Ok to leave voicemail.

## 2022-08-15 DIAGNOSIS — H40.9 UNSPECIFIED GLAUCOMA: ICD-10-CM

## 2022-08-15 DIAGNOSIS — G50.0 TRIGEMINAL NEURALGIA: ICD-10-CM

## 2022-08-15 DIAGNOSIS — I48.91 ATRIAL FIBRILLATION, UNSPECIFIED TYPE (H): ICD-10-CM

## 2022-08-15 DIAGNOSIS — R33.9 RETENTION OF URINE, UNSPECIFIED: ICD-10-CM

## 2022-08-15 DIAGNOSIS — S32.110D: Primary | ICD-10-CM

## 2022-08-17 ENCOUNTER — TELEPHONE (OUTPATIENT)
Dept: FAMILY MEDICINE | Facility: CLINIC | Age: 82
End: 2022-08-17

## 2022-08-17 NOTE — TELEPHONE ENCOUNTER
Forms received from: [facility]   Phone number listed: [phone]   Fax listed: [phone]  Date received: na  Form description: na  Once forms are completed, please return to [facility] via fax [phone].  Is patient requesting to be contacted when forms are completed:  na  Phone: na  Form placed:  To Marisol's basket  Michelle Young

## 2022-08-24 DIAGNOSIS — H40.9 UNSPECIFIED GLAUCOMA: ICD-10-CM

## 2022-08-24 DIAGNOSIS — G50.0 TRIGEMINAL NEURALGIA: ICD-10-CM

## 2022-08-24 DIAGNOSIS — I48.91 ATRIAL FIBRILLATION, UNSPECIFIED TYPE (H): ICD-10-CM

## 2022-08-24 DIAGNOSIS — R33.9 RETENTION OF URINE, UNSPECIFIED: ICD-10-CM

## 2022-08-24 DIAGNOSIS — S32.110D: Primary | ICD-10-CM

## 2022-08-24 PROCEDURE — G0180 MD CERTIFICATION HHA PATIENT: HCPCS | Performed by: NURSE PRACTITIONER

## 2022-08-25 ENCOUNTER — MEDICAL CORRESPONDENCE (OUTPATIENT)
Dept: HEALTH INFORMATION MANAGEMENT | Facility: CLINIC | Age: 82
End: 2022-08-25

## 2022-08-31 ENCOUNTER — OFFICE VISIT (OUTPATIENT)
Dept: FAMILY MEDICINE | Facility: CLINIC | Age: 82
End: 2022-08-31
Payer: COMMERCIAL

## 2022-08-31 VITALS
HEART RATE: 92 BPM | HEIGHT: 65 IN | WEIGHT: 169 LBS | SYSTOLIC BLOOD PRESSURE: 154 MMHG | OXYGEN SATURATION: 95 % | RESPIRATION RATE: 16 BRPM | TEMPERATURE: 98.2 F | DIASTOLIC BLOOD PRESSURE: 83 MMHG | BODY MASS INDEX: 28.16 KG/M2

## 2022-08-31 DIAGNOSIS — S32.110D: Primary | ICD-10-CM

## 2022-08-31 PROCEDURE — 99213 OFFICE O/P EST LOW 20 MIN: CPT | Performed by: STUDENT IN AN ORGANIZED HEALTH CARE EDUCATION/TRAINING PROGRAM

## 2022-08-31 ASSESSMENT — PAIN SCALES - GENERAL: PAINLEVEL: SEVERE PAIN (7)

## 2022-08-31 NOTE — PROGRESS NOTES
Assessment & Plan     1. Nondisplaced zone i fracture of sacrum, subsequent encounter for fracture with routine healing  Her mobility is improving and pain is improving as well.  Her UTI is resolved.  Continue conservative management.  Gerson the low-attenuation lesion medially from the left kidney that was seen on a CT spine hospitalization.  I Recommend ultrasound for further evaluation, however, patient declined at this time  Return in about 4 weeks (around 9/28/2022) for Follow up, in person.    An Sosa MD  [facility] TYLOR Gamboa is a 81 year old, presenting with her Son for the following health issues:  Pain      History of Present Illness       Reason for visit:  Followup to last visit    She eats 2-3 servings of fruits and vegetables daily.She consumes 0 sweetened beverage(s) daily.She exercises with enough effort to increase her heart rate 9 or less minutes per day.  She exercises with enough effort to increase her heart rate 3 or less days per week. She is missing 1 dose(s) of medications per week.  She is not taking prescribed medications regularly due to side effects.         Pain History:  When did you first notice your pain? - Acute Pain   Have you seen anyone else for your pain? No  Where in your body do you have pain? Musculoskeletal problem/pain  Onset/Duration: Since July   Description  Location: Bilateral hips - having pelvic pain    Joint Swelling: No  Redness: No  Pain: YES  Warmth: No  Intensity:  moderate, severe  Progression of Symptoms:  intermittent  Accompanying signs and symptoms:   Fevers: No  Numbness/tingling/weakness: No  History  Trauma to the area: YES- due to a fall.   Recent illness:  No  Previous similar problem: No  Previous evaluation:  YES- MRI and Xray   Precipitating or alleviating factors:  Aggravating factors include: walking  Therapies tried and outcome: acetaminophen does help with pain.     Patient is currently undergoing  "physical therapy.  Pain has improved but sometimes exacerbated by activities.  She is no longer using Robaxin and oxycodone.    Review of Systems   Constitutional, HEENT, cardiovascular, pulmonary, gi and gu systems are negative, except as otherwise noted.      Objective    BP (!) 154/83   Pulse 92   Temp 98.2  F (36.8  C) (Temporal)   Resp 16   Ht 1.644 m (5' 4.72\")   Wt 76.7 kg (169 lb)   SpO2 95%   BMI 28.36 kg/m    Body mass index is 28.36 kg/m .  Physical Exam   GENERAL: healthy, alert and no distress  RESP: lungs clear to auscultation - no rales, rhonchi or wheezes  CV: regular rate and rhythm, normal S1 S2,   ABDOMEN: soft, nontender, no hepatosplenomegaly, no masses and bowel sounds normal  MS: Decrease range of motion the lower extremity, no edema, she walks with a walker, however , she was able to get on the exam table recently to support      "

## 2022-09-06 ENCOUNTER — MEDICAL CORRESPONDENCE (OUTPATIENT)
Dept: HEALTH INFORMATION MANAGEMENT | Facility: CLINIC | Age: 82
End: 2022-09-06

## 2022-09-11 ENCOUNTER — HEALTH MAINTENANCE LETTER (OUTPATIENT)
Age: 82
End: 2022-09-11

## 2022-09-20 DIAGNOSIS — Z53.9 DIAGNOSIS NOT YET DEFINED: Primary | ICD-10-CM

## 2022-09-20 PROCEDURE — G0179 MD RECERTIFICATION HHA PT: HCPCS | Performed by: NURSE PRACTITIONER

## 2022-09-26 ENCOUNTER — TELEPHONE (OUTPATIENT)
Dept: FAMILY MEDICINE | Facility: CLINIC | Age: 82
End: 2022-09-26

## 2022-09-26 NOTE — TELEPHONE ENCOUNTER
Forms received from: [facility]   Phone number listed: [phone]   Fax listed: [phone]  Date received: 9/22/22  Form description:Home Health Certification and Plan of Care   Once forms are completed, please return to [facility]  via fax [phone].  Is patient requesting to be contacted when forms are completed: na  Phone: na  Form placed:  To Marisol's basket  Michelle Young

## 2023-02-21 ENCOUNTER — TRANSFERRED RECORDS (OUTPATIENT)
Dept: HEALTH INFORMATION MANAGEMENT | Facility: CLINIC | Age: 83
End: 2023-02-21

## 2023-03-09 ENCOUNTER — TRANSFERRED RECORDS (OUTPATIENT)
Dept: HEALTH INFORMATION MANAGEMENT | Facility: CLINIC | Age: 83
End: 2023-03-09

## 2023-04-30 ENCOUNTER — HEALTH MAINTENANCE LETTER (OUTPATIENT)
Age: 83
End: 2023-04-30

## 2023-07-05 ENCOUNTER — TELEPHONE (OUTPATIENT)
Dept: FAMILY MEDICINE | Facility: CLINIC | Age: 83
End: 2023-07-05
Payer: COMMERCIAL

## 2023-07-05 NOTE — TELEPHONE ENCOUNTER
Pt calling in stating she she needs to get an appt for frequent falls     Pt last fell this this morning but prior to that 2 weeks ago   Pt did not her head and denies LOC   Pt does live alone and uses cane and walker and baseline.,   Denies being hurt at this time. States her arm is a bit sore but denies swelling . And her bottom .   Not on blood thinners.     Unable to articulate how many falls per week .     Reports using walker and cane at baseline.     Denies Urinary symptoms at this time.    Pt states she just looses her balance at times and wants to see an MD   Reports being dizzy and lightheaded at time but not presently.    Pt placed on Scheduled with MD next week. Pt also instructed if she feels dizzy and or lightheaded and continues to experience frequent falls prior to the appt she needs to be evaluated in the emergency room.     PT verbalized her understanding and denies further questions.    Inga Lacey RN  [facility]

## 2023-07-13 ENCOUNTER — OFFICE VISIT (OUTPATIENT)
Dept: FAMILY MEDICINE | Facility: CLINIC | Age: 83
End: 2023-07-13
Payer: COMMERCIAL

## 2023-07-13 ENCOUNTER — TELEPHONE (OUTPATIENT)
Dept: VASCULAR SURGERY | Facility: CLINIC | Age: 83
End: 2023-07-13

## 2023-07-13 VITALS
DIASTOLIC BLOOD PRESSURE: 74 MMHG | HEIGHT: 65 IN | WEIGHT: 182.8 LBS | BODY MASS INDEX: 30.46 KG/M2 | HEART RATE: 104 BPM | SYSTOLIC BLOOD PRESSURE: 150 MMHG | TEMPERATURE: 97.9 F | RESPIRATION RATE: 22 BRPM | OXYGEN SATURATION: 96 %

## 2023-07-13 DIAGNOSIS — R29.6 FALLS FREQUENTLY: ICD-10-CM

## 2023-07-13 DIAGNOSIS — I78.1 SPIDER VEIN, SYMPTOMATIC: ICD-10-CM

## 2023-07-13 DIAGNOSIS — M54.2 NECK PAIN: ICD-10-CM

## 2023-07-13 DIAGNOSIS — M83.9 ADULT OSTEOMALACIA, UNSPECIFIED: ICD-10-CM

## 2023-07-13 DIAGNOSIS — R42 DIZZINESS: ICD-10-CM

## 2023-07-13 DIAGNOSIS — Z13.29 SCREENING FOR THYROID DISORDER: ICD-10-CM

## 2023-07-13 DIAGNOSIS — Z13.1 SCREENING FOR DIABETES MELLITUS: ICD-10-CM

## 2023-07-13 DIAGNOSIS — Z00.00 ENCOUNTER FOR MEDICARE ANNUAL WELLNESS EXAM: Primary | ICD-10-CM

## 2023-07-13 DIAGNOSIS — I48.91 ATRIAL FIBRILLATION, UNSPECIFIED TYPE (H): ICD-10-CM

## 2023-07-13 DIAGNOSIS — R79.9 ABNORMAL FINDING OF BLOOD CHEMISTRY, UNSPECIFIED: ICD-10-CM

## 2023-07-13 DIAGNOSIS — Z13.220 SCREENING CHOLESTEROL LEVEL: ICD-10-CM

## 2023-07-13 DIAGNOSIS — R53.83 FATIGUE, UNSPECIFIED TYPE: ICD-10-CM

## 2023-07-13 LAB
BASOPHILS # BLD AUTO: 0.1 10E3/UL (ref 0–0.2)
BASOPHILS NFR BLD AUTO: 1 %
CHOLEST SERPL-MCNC: 228 MG/DL
EOSINOPHIL # BLD AUTO: 0.5 10E3/UL (ref 0–0.7)
EOSINOPHIL NFR BLD AUTO: 5 %
ERYTHROCYTE [DISTWIDTH] IN BLOOD BY AUTOMATED COUNT: 12.2 % (ref 10–15)
FASTING STATUS PATIENT QL REPORTED: YES
FERRITIN SERPL-MCNC: 819 NG/ML (ref 11–328)
GLUCOSE SERPL-MCNC: 104 MG/DL (ref 70–99)
HCT VFR BLD AUTO: 37.9 % (ref 35–47)
HDLC SERPL-MCNC: 73 MG/DL
HGB BLD-MCNC: 13.1 G/DL (ref 11.7–15.7)
IMM GRANULOCYTES # BLD: 0 10E3/UL
IMM GRANULOCYTES NFR BLD: 0 %
IRON BINDING CAPACITY (ROCHE): 226 UG/DL (ref 240–430)
IRON SATN MFR SERPL: 41 % (ref 15–46)
IRON SERPL-MCNC: 93 UG/DL (ref 37–145)
LDLC SERPL CALC-MCNC: 123 MG/DL
LYMPHOCYTES # BLD AUTO: 2.7 10E3/UL (ref 0.8–5.3)
LYMPHOCYTES NFR BLD AUTO: 28 %
MCH RBC QN AUTO: 34.5 PG (ref 26.5–33)
MCHC RBC AUTO-ENTMCNC: 34.6 G/DL (ref 31.5–36.5)
MCV RBC AUTO: 100 FL (ref 78–100)
MONOCYTES # BLD AUTO: 0.8 10E3/UL (ref 0–1.3)
MONOCYTES NFR BLD AUTO: 9 %
NEUTROPHILS # BLD AUTO: 5.3 10E3/UL (ref 1.6–8.3)
NEUTROPHILS NFR BLD AUTO: 57 %
NONHDLC SERPL-MCNC: 155 MG/DL
PLATELET # BLD AUTO: 269 10E3/UL (ref 150–450)
RBC # BLD AUTO: 3.8 10E6/UL (ref 3.8–5.2)
TRIGL SERPL-MCNC: 159 MG/DL
TSH SERPL DL<=0.005 MIU/L-ACNC: 2.97 UIU/ML (ref 0.3–4.2)
VIT B12 SERPL-MCNC: 3173 PG/ML (ref 232–1245)
WBC # BLD AUTO: 9.4 10E3/UL (ref 4–11)

## 2023-07-13 PROCEDURE — 83540 ASSAY OF IRON: CPT | Performed by: NURSE PRACTITIONER

## 2023-07-13 PROCEDURE — 82728 ASSAY OF FERRITIN: CPT | Performed by: NURSE PRACTITIONER

## 2023-07-13 PROCEDURE — 85025 COMPLETE CBC W/AUTO DIFF WBC: CPT | Performed by: NURSE PRACTITIONER

## 2023-07-13 PROCEDURE — 84443 ASSAY THYROID STIM HORMONE: CPT | Performed by: NURSE PRACTITIONER

## 2023-07-13 PROCEDURE — 82306 VITAMIN D 25 HYDROXY: CPT | Performed by: NURSE PRACTITIONER

## 2023-07-13 PROCEDURE — 99214 OFFICE O/P EST MOD 30 MIN: CPT | Mod: 25 | Performed by: NURSE PRACTITIONER

## 2023-07-13 PROCEDURE — G0439 PPPS, SUBSEQ VISIT: HCPCS | Performed by: NURSE PRACTITIONER

## 2023-07-13 PROCEDURE — 83550 IRON BINDING TEST: CPT | Performed by: NURSE PRACTITIONER

## 2023-07-13 PROCEDURE — 36415 COLL VENOUS BLD VENIPUNCTURE: CPT | Performed by: NURSE PRACTITIONER

## 2023-07-13 PROCEDURE — 82607 VITAMIN B-12: CPT | Performed by: NURSE PRACTITIONER

## 2023-07-13 PROCEDURE — 80061 LIPID PANEL: CPT | Performed by: NURSE PRACTITIONER

## 2023-07-13 PROCEDURE — 82947 ASSAY GLUCOSE BLOOD QUANT: CPT | Performed by: NURSE PRACTITIONER

## 2023-07-13 RX ORDER — MECLIZINE HYDROCHLORIDE 25 MG/1
25 TABLET ORAL 3 TIMES DAILY PRN
Qty: 90 TABLET | Refills: 1 | Status: SHIPPED | OUTPATIENT
Start: 2023-07-13 | End: 2024-03-14

## 2023-07-13 ASSESSMENT — PAIN SCALES - GENERAL: PAINLEVEL: MILD PAIN (2)

## 2023-07-13 NOTE — TELEPHONE ENCOUNTER
7-13-23 Spoke w/pt; she does not want to pursue at this time; she wcb if she changes her mind.  sm

## 2023-07-13 NOTE — PATIENT INSTRUCTIONS
Patient Education   Personalized Prevention Plan  You are due for the preventive services outlined below.  Your care team is available to assist you in scheduling these services.  If you have already completed any of these items, please share that information with your care team to update in your medical record.  Health Maintenance Due   Topic Date Due     Pneumococcal Vaccine (1 - PCV) Never done     Zoster (Shingles) Vaccine (1 of 2) Never done     COVID-19 Vaccine (4 - Pfizer series) 01/26/2022     Annual Wellness Visit  03/18/2023       Exercise for a Healthier Heart  You may wonder how you can improve the health of your heart. If you re thinking about exercise, you re on the right track. You don t need to become an athlete. But you do need a certain amount of brisk exercise to help strengthen your heart. If you have been diagnosed with a heart condition, your healthcare provider may advise exercise to help your condition. To help make exercise a habit, choose safe, fun activities.      Exercise with a friend. When activity is fun, you're more likely to stick with it.     Before you start  Check with your healthcare provider before starting an exercise program. This is especially important if you haven't been active for a while. It's also important if you have a long-term (chronic) health problem such as heart disease, diabetes, or obesity. Also check with your provider if you're at high risk for having these problems.   Why exercise?  Exercising regularly offers many healthy rewards. It can help you do all of these:     Improve your blood cholesterol level to help prevent further heart trouble.    Lower your blood pressure to help prevent a stroke or heart attack.    Control diabetes or reduce your risk of getting this disease.    Improve your heart and lung function.    Reach and stay at a healthy weight.    Make your muscles stronger so you can stay active.    Prevent falls and fractures by slowing the loss  of bone mass (osteoporosis).    Manage stress better.    Improve your sense of self and your body image.  Exercise tips      Ease into your routine. Set small goals. Then build on them. Talk with your healthcare provider first before starting an exercise routine if you're not sure what your activity level should be.    Exercise on most days. Aim for a total of at least 150 minutes (2 hours and 30 minutes) or more of moderate-intensity aerobic activity each week. You could also do 75 minutes (1 hour and 15 minutes) or more of vigorous-intensity aerobic activity each week. Or try for a combination of both. Moderate activity means that you breathe heavier and your heart rate increases, but you can still talk. Think about doing at least 30 minutes of moderate exercise, 5 times a week. It's OK to work up to the 30-minute period over time. Examples of moderate-intensity activity are brisk walking, gardening, and water aerobics.    Step up your daily activity level.  Along with your exercise program, try being more active the whole day. Walk instead of drive. Or park further away so that you take more steps each day. Do more household tasks or yard work. You may not be able to meet the advised amount of physical activity. But doing some moderate- or vigorous-intensity aerobic activity can help reduce your risk for heart disease. Your healthcare provider can help you figure out what is best for you.    Choose 1 or more activities you enjoy.  Walking is one of the easiest things you can do. You can also try swimming, riding a bike, dancing, or taking an exercise class.    Call 911  Call 911 right away if any of these occur:     Chest pain that doesn't go away quickly with rest    New burning, tightness, pressure, or heaviness in your chest, neck, shoulders, back, or arms    Abnormal or severe shortness of breath    A very fast or irregular heartbeat (palpitations)    Fainting  When to call your healthcare provider  Call your  healthcare provider if you have any of these:     Dizziness or lightheadedness    Mild shortness of breath or chest pain    Increased or new joint or muscle pain    [employer] last reviewed this educational content on 7/1/2022 2000-2023 [employer]. All rights reserved. This information is not intended as a substitute for professional medical care. Always follow your healthcare professional's instructions.          Understanding USDA MyPlate  The USDA has guidelines to help you make healthy food choices. These are called MyPlate. MyPlate shows the food groups that make up healthy meals using the image of a place setting. Before you eat, think about the healthiest choices for what to put on your plate or in your cup or bowl. To learn more about building a healthy plate, visit www.myplate.gov.     The food groups    Fruits. Any fruit or 100% fruit juice counts as part of the Fruit Group. Fruits may be fresh, canned, frozen, or dried, and may be whole, cut-up, or pureed. Make 1/2 of your plate fruits and vegetables.    Vegetables. Any vegetable or 100% vegetable juice counts as a member of the Vegetable Group. Vegetables may be fresh, frozen, canned, or dried. They can be served raw or cooked and may be whole, cut-up, or mashed. Make 1/2 of your plate fruits and vegetables.    Grains. All foods made from grains are part of the Grains Group. These include wheat, rice, oats, cornmeal, and barley. Grains are often used to make foods such as bread, pasta, oatmeal, cereal, tortillas, and grits. Grains should be no more than 1/4 of your plate. At least half of your grains should be whole grains.    Protein. This group includes meat, poultry, seafood, beans and peas, eggs, processed soy products (such as tofu), nuts (including nut butters), and seeds. Make protein choices no more than 1/4 of your plate. Meat and poultry choices should be lean or low fat.    Dairy. The Dairy Group includes all fluid milk  products and foods made from milk that contain calcium, such as yogurt and cheese. (Foods that have little calcium, such as cream, butter, and cream cheese, are not part of this group.) Most dairy choices should be low-fat or fat-free.  Things to limit  Eating healthy also means limiting these things in your diet:    Oils. Oils aren't a food group, but they do contain essential nutrients. These are fats that are liquid at room temperature. Including small amounts of oils in your diet is fine and can even be good for you. But it's important to watch how much oil you include in your diet. Oils include avocado, canola, corn, olive, soybean, vegetable, and sunflower. Foods that are mainly oil include mayonnaise, certain salad dressings, and soft margarines. You likely already get your daily oil allowance from the foods you eat.    Salt (sodium).  Many processed foods have a lot of sodium. To keep sodium intake down, eat fresh vegetables, meats, poultry, and seafood when possible. Buy low-sodium, reduced-sodium, or no-salt-added food products at the store. And don't add salt to your meals at home. Instead, season them with herbs and spices such as dill, oregano, cumin, and paprika. Or try adding flavor with vinegar, onion, garlic, or lemon or lime zest and juice.    Saturated fat . Saturated fats are most often found in animal products such as beef, pork, and chicken. They are often solid at room temperature, such as butter. To reduce your saturated fat intake, choose leaner cuts of meat and poultry. And try healthier cooking methods such as grilling, broiling, roasting, or baking. For a simple lower-fat swap, use plain nonfat yogurt instead of mayonnaise when making potato salad or macaroni salad.    Added sugars.  These are sugars added to foods. They are in foods such as ice cream, candy, soda, fruit drinks, sports drinks, energy drinks, cookies, pastries, jams, and syrups. Cut down on added sugars by sharing sweet  treats with a family member or friend. You can also choose fruit for dessert, and drink water or other unsweetened beverages.    Alcohol. Alcohol contains calories but few nutrients. If you don't drink alcohol, don't start drinking for any reason. But if you do choose to drink alcohol, do so only in moderation. This means no more than 2 drinks in a day for men and no more than 1 drink per day for women, on days when you have alcohol.  [employer] last reviewed this educational content on 1/1/2023 2000-2023 [employer]. All rights reserved. This information is not intended as a substitute for professional medical care. Always follow your healthcare professional's instructions.        Activities of Daily Living    Your Health Risk Assessment indicates you have difficulties with activities of daily living such as housework, bathing, preparing meals, taking medication, etc. Please make a follow up appointment for us to address this issue in more detail.    Urinary Incontinence, Female (Adult)   Urinary incontinence means loss of bladder control. This problem affects many women, especially as they get older. If you have incontinence, you may be embarrassed to ask for help. But know that this problem can be treated.   Types of Incontinence  There are different types of incontinence. Two of the main types are described here. You can have more than one type.     Stress incontinence. With this type, urine leaks when pressure (stress) is put on the bladder. This may happen when you cough, sneeze, or laugh. Stress incontinence most often occurs because the pelvic floor muscles that support the bladder and urethra are weak. This can happen after pregnancy and vaginal childbirth or a hysterectomy. It can also be due to excess body weight or hormone changes.    Urge incontinence (also called overactive bladder). With this type, a sudden urge to urinate is felt often. This may happen even though there may not be much  urine in the bladder. The need to urinate often during the night is common. Urge incontinence most often occurs because of bladder spasms. This may be due to bladder irritation or infection. Damage to bladder nerves or pelvic muscles, constipation, and certain medicines can also lead to urge incontinence.  Treatment depends on the cause. Further evaluation is needed to find the type you have. This will likely include an exam and certain tests. Based on the results, you and your healthcare provider can then plan treatment. Until a diagnosis is made, the home care tips below can help ease symptoms.   Home care    Do pelvic floor muscle exercises, if they are prescribed. The pelvic floor muscles help support the bladder and urethra. Many women find that their symptoms improve when doing special exercises that strengthen these muscles. To do the exercises, contract the muscles you would use to stop your stream of urine. But do this when you re not urinating. Hold for 10 seconds, then relax. Repeat 10 to 20 times in a row, at least 3 times a day. Your healthcare provider may give you other instructions for how to do the exercises and how often.    Keep a bladder diary. This helps track how often and how much you urinate over a set period of time. Bring this diary with you to your next visit with the provider. The information can help your provider learn more about your bladder problem.    Lose weight, if advised to by your provider. Extra weight puts pressure on the bladder. Your provider can help you create a weight-loss plan that s right for you. This may include exercising more and making certain diet changes.    Don't have foods and drinks that may irritate the bladder. These can include alcohol and caffeinated drinks.    Quit smoking. Smoking and other tobacco use can lead to a long-term (chronic) cough that strains the pelvic floor muscles. Smoking may also damage the bladder and urethra. Talk with your provider  about treatments or methods you can use to quit smoking.    If drinking large amounts of fluid makes you have symptoms, you may be advised to limit your fluid intake. You may also be advised to drink most of your fluids during the day and to limit fluids at night.    If you re worried about urine leakage or accidents, you may wear absorbent pads to catch urine. Change the pads often. This helps reduce discomfort. It may also reduce the risk of skin or bladder infections.    Follow-up care  Follow up with your healthcare provider, or as directed. It may take some to find the right treatment for your problem. But healthy lifestyle changes can be made right away. These include such things as exercising on a regular basis, eating a healthy diet, losing weight (if needed), and quitting smoking. Your treatment plan may include special therapies or medicines. Certain procedures or surgery may also be options. Talk about any questions you have with your provider.   When to seek medical advice  Call the healthcare provider right away if any of these occur:    Fever of 100.4 F (38 C) or higher, or as directed by your provider    Bladder pain or fullness    Belly swelling    Nausea or vomiting    Back pain    Weakness, dizziness, or fainting  [employer] last reviewed this educational content on 1/1/2020 2000-2022 [employer]. All rights reserved. This information is not intended as a substitute for professional medical care. Always follow your healthcare professional's instructions.          Preventing Falls at Home  A person can fall for many reasons. Older adults may fall because reaction time slows as we age. Your muscles and joints may get stiff, weak, or less flexible because of illness, medicines, or a physical condition.   Other health problems that make falls more likely include:     Arthritis    Dizziness or lightheadedness when you stand up (orthostatic hypotension)    History of a  stroke    Dizziness    Anemia    Certain medicines taken for mental illness or to control blood pressure.    Problems with balance or gait    Bladder or urinary problems    History of falling    Changes in vision (vision impairment)    Changes in thinking skills and memory (cognitive impairment)    Muscle weakness    Excessive alcohol use  Falls can cause serious injuries, such as head trauma, broken bones, dislocated joints, internal bleeding, and cuts. Injuries like these can limit your independence.   Prevention tips  To help prevent falls and fall-related injuries, follow the tips below.    Floors  To make floors safer:     Put nonskid pads under area rugs.    Remove small rugs.    Replace worn floor coverings.    Tack carpets firmly to each step on carpeted stairs. Put nonskid strips on the edges of uncarpeted stairs.    Keep floors and stairs free of clutter and cords.    Keep floors and stairs clear of animal and children's toys    Arrange furniture so there are clear pathways.    Clean up any spills right away. Don't walk on wet floors.  Bathrooms    To make bathrooms safer:     Install grab bars in the tub or shower.    Install a raised (elevated) toilet or toilet seat.    Apply nonskid strips or put a nonskid rubber mat in the tub or shower.    Sit on a bath chair to bathe.    Use bathmats with nonskid backing.  Lighting  To improve visibility in your home:      Keep a flashlight in each room. Or put a lamp next to the bed within easy reach.    Put nightlights in the bedrooms, hallways, kitchen, and bathrooms.    Make sure all stairways have good lighting. There should be a light switch at the bottom and the top of each stairway.  Other changes to make    Look around to find any safety hazards. Look closely at doorways, walkways, and the driveway. Remove or repair any safety problems that you find.    Wear shoes that fit well. Never go barefoot or wear socks or slippers with smooth soles.    See your eye  "care provider once a year if you wear glasses. This is to be sure the prescription is still right for you.    Take your time when going up and down stairs; always use handrails. Never carry items in both hands.    Wear an alert necklace or bracelet if you are already prone to falling.    Put handrails on both sides of stairs and in walkways for more support. To prevent injury to your wrist or arm, don t use handrails to pull yourself up.    Use a \"reach stick\" to grab hard-to-reach items.    Install grab bars wherever needed to pull yourself up.    Arrange items that you use often. This will make them easier to find or reach.    Keep track of where your pets are so you don't trip over them when you are walking.    [employer] last reviewed this educational content on 11/1/2022 2000-2023 [employer]. All rights reserved. This information is not intended as a substitute for professional medical care. Always follow your healthcare professional's instructions.           "

## 2023-07-13 NOTE — PROGRESS NOTES
"  Assessment & Plan     Encounter for Medicare annual wellness exam      Fatigue, unspecified type    - Vitamin D Deficiency; Future  - Vitamin B12; Future  - Iron and iron binding capacity; Future  - Ferritin; Future  - CBC with platelets and differential; Future  - Physical Therapy Referral; Future  - CBC with platelets and differential  - Ferritin  - Iron and iron binding capacity  - Vitamin B12  - Vitamin D Deficiency    Falls frequently    - Physical Therapy Referral; Future    Screening for diabetes mellitus    - Glucose; Future  - Glucose    Screening for thyroid disorder    - TSH with free T4 reflex; Future  - TSH with free T4 reflex    Screening cholesterol level    - Lipid panel reflex to direct LDL Fasting; Future  - Lipid panel reflex to direct LDL Fasting    Neck pain    - Physical Therapy Referral; Future    Dizziness    - meclizine (ANTIVERT) 25 MG tablet; Take 1 tablet (25 mg) by mouth 3 times daily as needed for dizziness  - Physical Therapy Referral; Future    Spider vein, symptomatic    - Vascular Medicine Referral; Future    Adult osteomalacia, unspecified    - Vitamin D Deficiency; Future  - Vitamin D Deficiency    Abnormal finding of blood chemistry, unspecified    - Iron and iron binding capacity; Future  - Ferritin; Future  - Ferritin  - Iron and iron binding capacity    30 minutes spent by me on the date of the encounter doing chart review, history and exam, documentation and further activities per the note     BMI:   Estimated body mass index is 30.64 kg/m  as calculated from the following:    Height as of this encounter: 1.645 m (5' 4.76\").    Weight as of this encounter: 82.9 kg (182 lb 12.8 oz).   Weight management plan: Discussed healthy diet and exercise guidelines discussed need for physical therapy, daily walking/ exercise to improve strength, balance, energy, mood    See Patient Instructions: schedule with physical therapy. Follow up as needed.     JOSE E GODINEZ  M " "[facility] TYLOR Gamboa is a 82 year old, presenting for the following health issues:  Fall and Medicare Visit        7/13/2023    10:54 AM   Additional Questions   Roomed by Cleo   Accompanied by n/a         7/13/2023    10:54 AM   Patient Reported Additional Medications   Patient reports taking the following new medications multivitamin     Fall    History of Present Illness       Reason for visit:  Frequent falls    She eats 2-3 servings of fruits and vegetables daily.She consumes 1 sweetened beverage(s) daily.She exercises with enough effort to increase her heart rate 9 or less minutes per day.  She exercises with enough effort to increase her heart rate 3 or less days per week.   She is taking medications regularly.       Annual Wellness Visit    Patient has been advised of split billing requirements and indicates understanding: Yes     Are you in the first 12 months of your Medicare Part B coverage?  No    Physical Health:    In general, how would you rate your overall physical health? good    Outside of work, how many days during the week do you exercise?none    Outside of work, approximately how many minutes a day do you exercise?not applicable    If you drink alcohol do you typically have >3 drinks per day or >7 drinks per week? No    Do you usually eat at least 4 servings of fruit and vegetables a day, include whole grains & fiber and avoid regularly eating high fat or \"junk\" foods? No    Do you have any problems taking medications regularly? No    Do you have any side effects from medications? none    Needs assistance for the following daily activities: shopping, preparing meals and housework    Which of the following safety concerns are present in your home?  none identified     Hearing impairment: No    In the past 6 months, have you been bothered by leaking of urine? yes    Mental Health:    In general, how would you rate your overall mental or emotional health? " good  PHQ-2 Score: 2    Do you feel safe in your environment? Yes    Have you ever done Advance Care Planning? (For example, a Health Directive, POLST, or a discussion with a medical provider or your loved ones about your wishes)? Yes, patient states has an Advance Care Planning document and will bring a copy to the clinic.    Fall risk:  Fallen 2 or more times in the past year?: Yes  Any fall with injury in the past year?: Yes    Cognitive Screenin) Repeat 3 items (Leader, Season, Table)    2) Clock draw: NORMAL  3) 3 item recall: Recalls 3 objects  Results: 3 items recalled: COGNITIVE IMPAIRMENT LESS LIKELY    Mini-CogTM Copyright S [employer]. Licensed by the author for use in [facility]; reprinted with permission (soob@.[email or web address]). All rights reserved.      Do you have sleep apnea, excessive snoring or daytime drowsiness?: some drowsiness during the day     Social History     Tobacco Use     Smoking status: Former     Smokeless tobacco: Never   Substance Use Topics     Alcohol use: No     Alcohol/week: 0.0 standard drinks of alcohol             3/18/2022    10:30 AM   Alcohol Use   Prescreen: >3 drinks/day or >7 drinks/week? Not Applicable     Do you have a current opioid prescription? No  Do you use any other controlled substances or medications that are not prescribed by a provider? None    Current providers sharing in care for this patient include:   Patient Care Team:  Marisol Sandhu NP as PCP - General (Family Medicine)  Care, [facility]  Chetna Farias PA-C as Assigned Surgical Provider  Betina Pierce MD as Assigned PCP    Patient has been advised of split billing requirements and indicates understanding: Yes    I have reviewed Opioid Use Disorder and Substance Use Disorder risk factors and made any needed referrals.       Review of Systems   She reports recurrent falls, unsteady gait, neck pain. MH stable, some sadness r/t lack of  "care given by younger generation for aging adults. Lives alone. Attends [employer], has friends. Dizziness- meclizine helps. Spider veins- symptomatic. Denies chest pain, shortness of breath. In agreement to labs.       Objective    BP (!) 150/74   Pulse 104   Temp 97.9  F (36.6  C) (Temporal)   Resp 22   Ht 1.645 m (5' 4.76\")   Wt 82.9 kg (182 lb 12.8 oz)   SpO2 96%   BMI 30.64 kg/m    Body mass index is 30.64 kg/m .  Physical Exam   GENERAL: healthy, alert and no distress  EYES: Eyes grossly normal to inspection, PERRL and conjunctivae and sclerae normal  HENT: ear canals and TM's normal, nose and mouth without ulcers or lesions  NECK: no adenopathy, no asymmetry, masses, or scars and thyroid normal to palpation  RESP: lungs clear to auscultation - no rales, rhonchi or wheezes  BREAST: deferred per guidelines asymptomatic per pt  CV: regular rate and rhythm, normal S1 S2, no S3 or S4, no murmur, click or rub, no peripheral edema and peripheral pulses strong  ABDOMEN: soft, nontender, no hepatosplenomegaly, no masses and bowel sounds normal   (female): deferred per patient, no concerns  MS: no gross musculoskeletal defects noted, no edema, no CVA tenderness POSITIVE for painful spider veins to legs  SKIN: no suspicious lesions or rashes  NEURO: Normal strength and tone, cranial nerves intact, mentation intact and speech normal  PSYCH: mentation appears normal, affect normal/bright; tearful at times when discussing the lack of concern people have for aging adults  LYMPH: no cervical, supraclavicular, axillaryadenopathy    Slight unsteadiness with ambulation, requires assistance getting onto and off of exam table.     See orders              "

## 2023-07-13 NOTE — PROGRESS NOTES
She is at risk for lack of exercise and has been provided with information to increase physical activity for the benefit of her well-being.  The patient was counseled and encouraged to consider modifying their diet and eating habits. She was provided with information on recommended healthy diet options.  The patient reports that she has difficulty with activities of daily living. I have asked that the patient make a follow up appointment in 8 weeks where this issue will be further evaluated and addressed.  Information on urinary incontinence and treatment options given to patient.  She is at risk for falling and has been provided with information to reduce the risk of falling at home.  Answers for HPI/ROS submitted by the patient on 7/13/2023  What is the reason for your visit today? : frequent falls  How many servings of fruits and vegetables do you eat daily?: 2-3  On average, how many sweetened beverages do you drink each day (Examples: soda, juice, sweet tea, etc.  Do NOT count diet or artificially sweetened beverages)?: 1  How many minutes a day do you exercise enough to make your heart beat faster?: 9 or less  How many days a week do you exercise enough to make your heart beat faster?: 3 or less  How many days per week do you miss taking your medication?: 0

## 2023-07-14 LAB — DEPRECATED CALCIDIOL+CALCIFEROL SERPL-MC: 37 UG/L (ref 20–75)

## 2023-07-14 NOTE — RESULT ENCOUNTER NOTE
Slade Gamboa,    Thank you for your recent office visit.    Here are your recent results.  Vitamin B12 level is pretty high.  Consider reducing supplement dosing.  Your ferritin level is elevated as well.  Your glucose is considered normal, not diabetic.  For a nondiabetic your cholesterol is considered normal.  We base your cholesterol off the bad cholesterol, the LDL, for a nondiabetic we want your cholesterol less than 190.  Your iron level is normal.  Your CBC is normal.  Your vitamin D is normal.    Feel free to contact me via beSUCCESS or call the clinic at [phone].    Sincerely,    RAQUEL Mcneil, FNP-BC

## 2023-07-17 PROBLEM — R53.83 FATIGUE, UNSPECIFIED TYPE: Status: ACTIVE | Noted: 2023-07-17

## 2023-07-17 PROBLEM — R29.6 FALLS FREQUENTLY: Status: ACTIVE | Noted: 2023-07-17

## 2023-07-17 PROBLEM — I78.1 SPIDER VEIN, SYMPTOMATIC: Status: ACTIVE | Noted: 2023-07-17

## 2023-07-17 PROBLEM — M54.2 NECK PAIN: Status: ACTIVE | Noted: 2023-07-17

## 2023-07-17 PROBLEM — R42 DIZZINESS: Status: ACTIVE | Noted: 2023-07-17

## 2023-07-17 ASSESSMENT — ACTIVITIES OF DAILY LIVING (ADL)
CURRENT_FUNCTION: NEEDS ASSISTANCE

## 2023-07-24 ENCOUNTER — OFFICE VISIT (OUTPATIENT)
Dept: VASCULAR SURGERY | Facility: CLINIC | Age: 83
End: 2023-07-24
Payer: COMMERCIAL

## 2023-07-24 DIAGNOSIS — I83.813 VARICOSE VEINS OF BILATERAL LOWER EXTREMITIES WITH PAIN: Primary | ICD-10-CM

## 2023-07-24 PROCEDURE — 99203 OFFICE O/P NEW LOW 30 MIN: CPT | Performed by: SURGERY

## 2023-07-24 NOTE — NURSING NOTE
Patient Reported symptoms:    Right leg   Heaviness A good bit of the time   Achiness A good bit of the time   Swelling Most of the time   Throbbing A good bit of the time   Itching A good bit of the time   Appearance Extremely noticeable   Impact on work/activities Symptoms but full able to participate    Left Leg   Heaviness A good bit of the time   Achiness A good bit of the time   Swelling Most of the time   Throbbing A good bit of the time   Itching A good bit of the time   Appearance Extremely noticeable   Impact on work/activities Symptoms but full able to participate

## 2023-07-24 NOTE — PROGRESS NOTES
VEINSOLUTIONS CONSULTATION    HPI:    Bee Yepez is a pleasant 82 year old female referred by Marisol Rojas NP for evaluation of bilateral lower extremity swelling and pain.  She was noted to have significant tenderness of the distal medial legs on exam and a recent clinic visit and was sent for further evaluation.  She says she has had varicose veins for years but has not paid much attention to them.  She does admit that pedicures are quite painful.  The patient has noted no new erythema, or increase in swelling.  She has no history of venous thromboembolism or hemorrhage.  The pain is not much of a concern unless the leg is bumped, sometimes occurring when she rolls over at night during sleep.  She has not worn compression hose to any significant extent.    Her family history is significant for varicose veins in her mother and her sister.  There is no family history of venous thromboembolism to her knowledge.    The patient does report balance problems and wonders if vein problems could be contributing to this.    PAST MEDICAL HISTORY:   Past Medical History:   Diagnosis Date    Ex-smoker     3 PPD for 25 years ago, quit in .    Family history of breast cancer     sister at age 45    Family history of colon cancer     sister in her 60s    Osteopenia     per patient report.       PAST SURGICAL HISTORY:   Past Surgical History:   Procedure Laterality Date    CHOLECYSTECTOMY      in the 1970s    TUBAL LIGATION         FAMILY HISTORY:   Family History   Problem Relation Age of Onset    Coronary Artery Disease Father         had a pacemaker    Breast Cancer Sister          at age 45    Colon Cancer Sister         Dx in her 60s    Lung Cancer Sister         Dx in her 60s    Diabetes No family hx of        SOCIAL HISTORY:   Social History     Tobacco Use    Smoking status: Former    Smokeless tobacco: Never   Substance Use Topics    Alcohol use: No     Alcohol/week: 0.0 standard drinks of alcohol        REVIEW OF SYSTEMS: Review Of Systems  Skin: Rash, itching  Eyes: negative  Ears/Nose/Throat: Dizziness  Respiratory: No shortness of breath, dyspnea on exertion, cough, or hemoptysis  Cardiovascular: negative  Gastrointestinal: negative  Genitourinary: negative  Musculoskeletal: Leg swelling  Neurologic: Weakness  Psychiatric: anxiety  Hematologic/Lymphatic/Immunologic: Easy bruising  Endocrine: negative      Vital signs:  There were no vitals taken for this visit.    Current Outpatient Medications   Medication Sig Dispense Refill    acetaminophen (TYLENOL) 500 MG tablet Take 500-1,000 mg by mouth every 6 hours as needed for mild pain      aspirin (ASA) 325 MG EC tablet Take 325 mg by mouth every 6 hours as needed for moderate pain      calcium carbonate-vitamin D 600-400 MG-UNIT CHEW Take 1 chew tab by mouth 2 times daily      carBAMazepine (TEGRETOL XR) 200 MG 12 hr tablet TAKE ONE TABLET BY MOUTH TWICE DAILY 60 tablet 11    Cyanocobalamin (B-12 PO)       magnesium 250 MG tablet Take 1 tablet by mouth 2 times daily      meclizine (ANTIVERT) 25 MG tablet Take 1 tablet (25 mg) by mouth 3 times daily as needed for dizziness 90 tablet 1    polyethylene glycol (MIRALAX) 17 g packet Take 1 packet by mouth daily         PHYSICAL EXAM:  General: Pleasant, NAD.   HEENT: Normocephalic, atraumatic, external ears and nose normal.   Respiratory: Normal respiratory effort.   Cardiovascular: Pulse is regular.   Musculoskeletal: Gait and station normal.  The joints of her fingers and toes without deformity.  There is no cyanosis of her nailbeds.   EXTREMITIES: Right lower extremity: Small varicosities over the distal medial thigh and proximal medial calf.  No stasis changes.  Edema of her distal leg with some tenderness of the tissues.  No significant lipodermatosclerosis.    Left lower extremity: 3 to 4 mm varicosity over the left proximal medial calf.  No significant venous stasis changes.  Trace to 1+ edema.  Her toes  are somewhat cool and slightly dusky.  She does not admit to symptoms of rest pain, however.  PULSES: R/L (3=normal pulse, 0=no palpable pulse) dorsalis pedis: 3/0; posterior tibial: 0/0; popliteal 3/2.      Neurologic: Grossly normal  Psychiatric: Mood, affect, judgment and insight are normal     ASSESSMENT:  Bilateral lower extremity tenderness may well be related to chronic edema with secondary inflammation.  Mrs. Yepez is quite frail and I do not believe to be able to don compression hose.  I encouraged her to elevate her legs when possible to help control the edema.  I also encouraged her to remain ambulatory as much as safely possible, utilizing her walker for stability.    We discussed the dangers of ambulating if she continues to have balance disturbance.  Vein problems should not contribute to this.    I will recommend continued conservative measures.  I discussed this with the patient who voiced understanding and his questions were answered.    PLAN:  Continue conservative measures     Juan Diego Pratt MD    Dictated using Dragon voice recognition software which may result in transcription errors          VEIN CLINIC LEG DRAWING:

## 2023-07-24 NOTE — LETTER
2023         RE: Bee Yepez  [address]  [place] [address]        Dear Colleague,    Thank you for referring your patient, Bee Yepez, to the [facility] [place]. Please see a copy of my visit note below.    VEINSOLUTIONS CONSULTATION    HPI:    Bee Yepez is a pleasant 82 year old female referred by Marisol Rojas NP for evaluation of bilateral lower extremity swelling and pain.  She was noted to have significant tenderness of the distal medial legs on exam and a recent clinic visit and was sent for further evaluation.  She says she has had varicose veins for years but has not paid much attention to them.  She does admit that pedicures are quite painful.  The patient has noted no new erythema, or increase in swelling.  She has no history of venous thromboembolism or hemorrhage.  The pain is not much of a concern unless the leg is bumped, sometimes occurring when she rolls over at night during sleep.  She has not worn compression hose to any significant extent.    Her family history is significant for varicose veins in her mother and her sister.  There is no family history of venous thromboembolism to her knowledge.    The patient does report balance problems and wonders if vein problems could be contributing to this.    PAST MEDICAL HISTORY:   Past Medical History:   Diagnosis Date     Ex-smoker     3 PPD for 25 years ago, quit in .     Family history of breast cancer     sister at age 45     Family history of colon cancer     sister in her 60s     Osteopenia     per patient report.       PAST SURGICAL HISTORY:   Past Surgical History:   Procedure Laterality Date     CHOLECYSTECTOMY      in the 1970s     TUBAL LIGATION         FAMILY HISTORY:   Family History   Problem Relation Age of Onset     Coronary Artery Disease Father         had a pacemaker     Breast Cancer Sister          at age 45     Colon Cancer Sister         Dx in her 60s      Lung Cancer Sister         Dx in her 60s     Diabetes No family hx of        SOCIAL HISTORY:   Social History     Tobacco Use     Smoking status: Former     Smokeless tobacco: Never   Substance Use Topics     Alcohol use: No     Alcohol/week: 0.0 standard drinks of alcohol       REVIEW OF SYSTEMS: Review Of Systems  Skin: Rash, itching  Eyes: negative  Ears/Nose/Throat: Dizziness  Respiratory: No shortness of breath, dyspnea on exertion, cough, or hemoptysis  Cardiovascular: negative  Gastrointestinal: negative  Genitourinary: negative  Musculoskeletal: Leg swelling  Neurologic: Weakness  Psychiatric: anxiety  Hematologic/Lymphatic/Immunologic: Easy bruising  Endocrine: negative      Vital signs:  There were no vitals taken for this visit.    Current Outpatient Medications   Medication Sig Dispense Refill     acetaminophen (TYLENOL) 500 MG tablet Take 500-1,000 mg by mouth every 6 hours as needed for mild pain       aspirin (ASA) 325 MG EC tablet Take 325 mg by mouth every 6 hours as needed for moderate pain       calcium carbonate-vitamin D 600-400 MG-UNIT CHEW Take 1 chew tab by mouth 2 times daily       carBAMazepine (TEGRETOL XR) 200 MG 12 hr tablet TAKE ONE TABLET BY MOUTH TWICE DAILY 60 tablet 11     Cyanocobalamin (B-12 PO)        magnesium 250 MG tablet Take 1 tablet by mouth 2 times daily       meclizine (ANTIVERT) 25 MG tablet Take 1 tablet (25 mg) by mouth 3 times daily as needed for dizziness 90 tablet 1     polyethylene glycol (MIRALAX) 17 g packet Take 1 packet by mouth daily         PHYSICAL EXAM:  General: Pleasant, NAD.   HEENT: Normocephalic, atraumatic, external ears and nose normal.   Respiratory: Normal respiratory effort.   Cardiovascular: Pulse is regular.   Musculoskeletal: Gait and station normal.  The joints of her fingers and toes without deformity.  There is no cyanosis of her nailbeds.   EXTREMITIES: Right lower extremity: Small varicosities over the distal medial thigh and proximal  medial calf.  No stasis changes.  Edema of her distal leg with some tenderness of the tissues.  No significant lipodermatosclerosis.    Left lower extremity: 3 to 4 mm varicosity over the left proximal medial calf.  No significant venous stasis changes.  Trace to 1+ edema.  Her toes are somewhat cool and slightly dusky.  She does not admit to symptoms of rest pain, however.  PULSES: R/L (3=normal pulse, 0=no palpable pulse) dorsalis pedis: 3/0; posterior tibial: 0/0; popliteal 3/2.      Neurologic: Grossly normal  Psychiatric: Mood, affect, judgment and insight are normal     ASSESSMENT:  Bilateral lower extremity tenderness may well be related to chronic edema with secondary inflammation.  Mrs. Yepez is quite frail and I do not believe to be able to don compression hose.  I encouraged her to elevate her legs when possible to help control the edema.  I also encouraged her to remain ambulatory as much as safely possible, utilizing her walker for stability.    We discussed the dangers of ambulating if she continues to have balance disturbance.  Vein problems should not contribute to this.    I will recommend continued conservative measures.  I discussed this with the patient who voiced understanding and his questions were answered.    PLAN:  Continue conservative measures     Juan Diego Pratt MD    Dictated using Dragon voice recognition software which may result in transcription errors          VEIN CLINIC LEG DRAWING:                Again, thank you for allowing me to participate in the care of your patient.        Sincerely,        Juan Diego Pratt MD

## 2023-08-19 ENCOUNTER — THERAPY VISIT (OUTPATIENT)
Dept: PHYSICAL THERAPY | Facility: CLINIC | Age: 83
End: 2023-08-19
Attending: NURSE PRACTITIONER
Payer: COMMERCIAL

## 2023-08-19 DIAGNOSIS — R53.83 FATIGUE, UNSPECIFIED TYPE: ICD-10-CM

## 2023-08-19 DIAGNOSIS — R42 DIZZINESS: ICD-10-CM

## 2023-08-19 DIAGNOSIS — R29.6 FALLS FREQUENTLY: Primary | ICD-10-CM

## 2023-08-19 DIAGNOSIS — M54.2 NECK PAIN: ICD-10-CM

## 2023-08-19 PROCEDURE — 97161 PT EVAL LOW COMPLEX 20 MIN: CPT | Mod: GP | Performed by: PHYSICAL THERAPIST

## 2023-08-19 PROCEDURE — 97110 THERAPEUTIC EXERCISES: CPT | Mod: GP | Performed by: PHYSICAL THERAPIST

## 2023-08-19 NOTE — PROGRESS NOTES
"PHYSICAL THERAPY EVALUATION  Type of Visit: Evaluation    See electronic medical record for Abuse and Falls Screening details.    Subjective       Presenting condition or subjective complaint:  I can't recover from that fall (pelvis). some days in a lot of pain. Points ot left buttock/low back.  Seems to stay about the same, if I start moving around more than normal it bothers me. I am dizzi, probably just for minutes.  Doesn't happen in bed or in chair (pretty stable then). I get and start moving I feel it. Last week was on the stool, diarrhea and couldn't get up then got nauseated and vomited. Eventually got up and went to bed. Normally no nausea complaints. Since the bad fall and broke pelvis my balance has been worse.  2 weeks ago was feeling good and had cane and tripped on curb. Hit her face but did not break her glasses.  [facility] 72/100  Vestibular form in [facility]: symptoms: off balance, nausea/vomiting, likely to fall, lightheadedness  Ears have constant ringing  What helps? sitting in my chair  What makes worse?  Too much moving  Activities that create your symptoms? Turning while walking, unstable surfaces, walking up/down stairs, bending over, reaching up    Date of onset: 07/13/23 (order date)  order date 7/13/2023  Relevant medical history:   see epic, trigeminal neuralgia  Dates & types of surgery:    Prior diagnostic imaging/testing results:     EMG at [facility] distal sensorimotor axonal peripheral neuropathy  Prior therapy history for the same diagnosis, illness or injury:    had home care after her pelvis fracture but no outpt therapy. \"I like the idea of coming here, I dont do the exercises at home\".     Prior Level of Function  Transfers: Independent  Ambulation: Independent, Assistive equipment  ADL: Independent, Assistive equipment  IADL: Driving, Finances, Housekeeping, Laundry, Meal preparation, Medication management. Has housekeeping once a month. No yard work. Not very often anymore where she goes " walking with 4ww for exercise.    Living Environment  Social support:   2 kids but not over to her home often, They have helped a lot after the bad fall. Her sister stayed with her for 6 weeks after her pelvis fracture. Friends.  Type of home:   [facility], live alone, one level  Stairs to enter the home:       none, threshold is it  Ramp:   none  Stairs inside the home:       none  Help at home:  none  Equipment owned:   cane (only if I am feeling really good), 2ww in house, 4ww with seat for exercise walks, POV at grocery store (too hard to get around with just the cart)  Employment:    retired.  Hobbies/Interests:  cards weekly at [employer]. (uses her cane)  Patient goals for therapy:  help my balance    Pain assessment: Pain present pelvis mild seated today, really doesn't hurt seated.      Objective   Cognitive Status Examination  Orientation: Oriented to person, place and time   Level of Consciousness: Alert  Follows Commands and Answers Questions: 100% of the time  Personal Safety and Judgement: Intact  Memory: Impaired, per pt I have trouble bringing up my thoughts sometimes. I can hardly play cards anymore due to my brain.     OBSERVATION:   INTEGUMENTARY: Intact  POSTURE: WFL  PALPATION:   RANGE OF MOTION: neck WFLs including retraction/protraction, arms WNLS,  STRENGTH:   BED MOBILITY:   TRANSFERS: Independent    GAIT:   Level of [place]: Independent  Assistive Device(s): Walker (front wheeled)  Gait Deviations: WFL  Gait Distance: n/a  Stairs: n/a    SPECIAL TESTS  Functional Gait Assessment (FGA)      10 Meter Walk Test (Comfortable)  4ww 11.03 seconds and 17 steps. Repeat 10.93 seconds and 17 steps.  2ww 11.78 seconds and 19 steps.   SEC 10.63 seconds and 16 steps. Repeat 11 seconds and 17 steps.    As we were walking in [place] for > 5 minutes she then reported feeling spinning sensation while standing, she agreed feels like she could faint. Once seated felt better.   10 Meter Walk Test (Fast)   2ww 8.88 seconds and 16 steps      Gait with 2ww: she picks it up to turn, multiple times. I raised it up an inch in ht.   Gait with head motion horizontal and vertical with walker she does not like it. Tells me she doesn't do this. She slowed down significantly.      vitals  159/86 and HR 92 BPMS left arm seated, After walking 180/89 and HR 90 BMS                                       30 Second Sit to Stand (reps/height) 20 inch ht, 4 reps, She had 2 reps that she didn't get fully upright and then went backwards without much control to sit (not counted in total).  No pain.         SENSATION:  mild neuropathy  REFLEXES:   COORDINATION:   MUSCLE TONE:     Assessment & Plan   CLINICAL IMPRESSIONS  Medical Diagnosis: falls, fatigue, dizziness, neck pain    Treatment Diagnosis: multifactorial fall risk   Impression/Assessment: Patient is a 82 year old female with balance complaints: hx of falls, dizziness and fatigue.  Her BP is elevated and needs to be further monitored.  The following significant findings have been identified: Pain, Decreased ROM/flexibility, Decreased joint mobility, Decreased strength, Impaired balance, Decreased proprioception, Impaired sensation, Impaired gait, Impaired muscle performance, Decreased activity tolerance, Impaired posture, and Dizziness. These impairments interfere with their ability to perform self care tasks, recreational activities, household chores, household mobility, and community mobility as compared to previous level of function.     Clinical Decision Making (Complexity):  Clinical Presentation: Stable/Uncomplicated  Clinical Presentation Rationale: based on medical and personal factors listed in PT evaluation  Clinical Decision Making (Complexity): Low complexity    PLAN OF CARE  Treatment Interventions:  Interventions: Gait Training, Manual Therapy, Neuromuscular Re-education, Therapeutic Exercise    Long Term Goals     PT Goal 1  Goal Identifier: leg strength/control  Goal  Description: She will be able to do 8 reps of STS from 20 inch ht chair consistently  Rationale: to maximize safety and independence with performance of ADLs and functional tasks;to maximize safety and independence within the home;to maximize safety and independence within the community  Target Date: 11/16/23  PT Goal 2  Goal Identifier: HEP  Goal Description: She will be able to do 3-5 exercises a day for her strength, balance and activity tolerance  Rationale: to maximize safety and independence with performance of ADLs and functional tasks;to maximize safety and independence within the home;to maximize safety and independence within the community  Target Date: 11/16/23  PT Goal 3  Goal Identifier: DHI  Goal Description: Improve on DHI (self rating of symptoms) from 72/100 to 50/100 or less  Rationale: to maximize safety and independence within the home;to maximize safety and independence with performance of ADLs and functional tasks;to maximize safety and independence within the community  Target Date: 11/16/23  PT Goal 4  Goal Identifier: gait with head motion  Goal Description: She will be able to walk with 2ww or 4ww with head motion with only minimal change in her gait speed or quality AND she will feel comfortable  Rationale: to maximize safety and independence with performance of ADLs and functional tasks;to maximize safety and independence within the home;to maximize safety and independence with transportation;to maximize safety and independence with self cares  Target Date: 11/16/23      Frequency of Treatment: 1 x every week to 2 weeks  Duration of Treatment: 12 weeks    Recommended Referrals to Other Professionals:     Education Assessment:   Learner/Method: Patient;Listening;Reading;Demonstration;Pictures/Video    Risks and benefits of evaluation/treatment have been explained.   Patient/Family/caregiver agrees with Plan of Care.     Evaluation Time:     PT Eval, Low Complexity Minutes (12640):  35    Signing Clinician: Stephanie Dueñas, PT, NCS      [facility]                                                                                   OUTPATIENT PHYSICAL THERAPY      PLAN OF TREATMENT FOR OUTPATIENT REHABILITATION   Patient's Last Name, First Name, Bee Brito YOB: 1940   Provider's Name   [facility]   Medical Record No.  [id number]     Onset Date: 07/13/23 (order date)  Start of Care Date: 08/19/23     Medical Diagnosis:  falls, fatigue, dizziness, neck pain      PT Treatment Diagnosis:  multifactorial fall risk Plan of Treatment  Frequency/Duration: 1 x every week to 2 weeks/ 12 weeks    Certification date from 08/19/23 to 11/16/23         See note for plan of treatment details and functional goals     Stephanie Dueñas PT                         I CERTIFY THE NEED FOR THESE SERVICES FURNISHED UNDER        THIS PLAN OF TREATMENT AND WHILE UNDER MY CARE     (Physician attestation of this document indicates review and certification of the therapy plan).                Referring Provider:  Marisol Sandhu      Initial Assessment  See Epic Evaluation- Start of Care Date: 08/19/23

## 2023-08-25 ENCOUNTER — THERAPY VISIT (OUTPATIENT)
Dept: PHYSICAL THERAPY | Facility: CLINIC | Age: 83
End: 2023-08-25
Attending: NURSE PRACTITIONER
Payer: COMMERCIAL

## 2023-08-25 DIAGNOSIS — R29.6 FALLS FREQUENTLY: ICD-10-CM

## 2023-08-25 DIAGNOSIS — R42 DIZZINESS: Primary | ICD-10-CM

## 2023-08-25 PROCEDURE — 97116 GAIT TRAINING THERAPY: CPT | Mod: GP | Performed by: PHYSICAL THERAPIST

## 2023-08-25 PROCEDURE — 97110 THERAPEUTIC EXERCISES: CPT | Mod: GP | Performed by: PHYSICAL THERAPIST

## 2023-08-25 PROCEDURE — 97535 SELF CARE MNGMENT TRAINING: CPT | Mod: GP | Performed by: PHYSICAL THERAPIST

## 2023-08-31 ENCOUNTER — THERAPY VISIT (OUTPATIENT)
Dept: PHYSICAL THERAPY | Facility: CLINIC | Age: 83
End: 2023-08-31
Attending: NURSE PRACTITIONER
Payer: COMMERCIAL

## 2023-08-31 DIAGNOSIS — R42 DIZZINESS: Primary | ICD-10-CM

## 2023-08-31 DIAGNOSIS — R29.6 FALLS FREQUENTLY: ICD-10-CM

## 2023-08-31 PROCEDURE — 97110 THERAPEUTIC EXERCISES: CPT | Mod: GP | Performed by: PHYSICAL THERAPIST

## 2023-09-06 ENCOUNTER — THERAPY VISIT (OUTPATIENT)
Dept: PHYSICAL THERAPY | Facility: CLINIC | Age: 83
End: 2023-09-06
Attending: NURSE PRACTITIONER
Payer: COMMERCIAL

## 2023-09-06 DIAGNOSIS — R42 DIZZINESS: Primary | ICD-10-CM

## 2023-09-06 DIAGNOSIS — R29.6 FALLS FREQUENTLY: ICD-10-CM

## 2023-09-06 PROCEDURE — 97112 NEUROMUSCULAR REEDUCATION: CPT | Mod: GP

## 2023-09-06 PROCEDURE — 97110 THERAPEUTIC EXERCISES: CPT | Mod: GP

## 2023-09-11 ENCOUNTER — NURSE TRIAGE (OUTPATIENT)
Dept: NURSING | Facility: CLINIC | Age: 83
End: 2023-09-11
Payer: COMMERCIAL

## 2023-09-11 ENCOUNTER — OFFICE VISIT (OUTPATIENT)
Dept: URGENT CARE | Facility: URGENT CARE | Age: 83
End: 2023-09-11
Payer: COMMERCIAL

## 2023-09-11 VITALS
TEMPERATURE: 97.9 F | OXYGEN SATURATION: 98 % | DIASTOLIC BLOOD PRESSURE: 84 MMHG | HEART RATE: 89 BPM | RESPIRATION RATE: 18 BRPM | WEIGHT: 180 LBS | BODY MASS INDEX: 30.17 KG/M2 | SYSTOLIC BLOOD PRESSURE: 138 MMHG

## 2023-09-11 DIAGNOSIS — I48.91 ATRIAL FIBRILLATION, UNSPECIFIED TYPE (H): ICD-10-CM

## 2023-09-11 DIAGNOSIS — R03.0 ELEVATED BLOOD PRESSURE READING WITHOUT DIAGNOSIS OF HYPERTENSION: Primary | ICD-10-CM

## 2023-09-11 PROBLEM — H40.9 GLAUCOMA: Chronic | Status: ACTIVE | Noted: 2019-03-08

## 2023-09-11 PROBLEM — M62.838 SPASM OF MUSCLE: Status: ACTIVE | Noted: 2020-03-04

## 2023-09-11 PROBLEM — F17.201 TOBACCO ABUSE, IN REMISSION: Status: ACTIVE | Noted: 2019-03-08
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
## 2023-09-11 PROBLEM — G62.9 NEUROPATHY: Status: ACTIVE | Noted: 2023-02-21

## 2023-09-11 PROBLEM — M54.81 OCCIPITAL NEURALGIA: Status: ACTIVE | Noted: 2020-03-04

## 2023-09-11 PROBLEM — G24.1 FRAGMENTS OF TORSION DYSTONIA: Status: ACTIVE | Noted: 2020-03-04
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
## 2023-09-11 PROBLEM — R41.89 COGNITIVE IMPAIRMENT: Status: ACTIVE | Noted: 2019-03-09

## 2023-09-11 PROCEDURE — 99213 OFFICE O/P EST LOW 20 MIN: CPT | Performed by: NURSE PRACTITIONER

## 2023-09-11 NOTE — PROGRESS NOTES
Assessment & Plan      Diagnosis Comments   1. Elevated blood pressure reading without diagnosis of hypertension        2. Atrial fibrillation, unspecified type (H)        Recheck of blood pressure with wall-mounted in exam room blood pressure was within normal range.  Discussed recommendations with patient to continue to monitor blood pressure closely 2-3 times per day with heart rate she will keep a journal of this and schedule a follow-up appointment with her primary care provider she states that she has been having a hard time getting into see her primary care provider.  We discussed possibly doing a video or virtual visit she was unable to log into her [employer] via her home computer we did look at this today and I gave her a contact number to get in touch with someone that can help her with her MyChart at home.  Patient verbalized understanding of that she will continue physical therapy as she has been.  We discussed red flags that would warrant emergent evaluation.      RAQUEL Rojo [facility]    Ashley Gamboa is a 82 year old female who presents to clinic today for the following health issues:  Chief Complaint   Patient presents with    Urgent Care    Hypertension     Per patient states  blood pressure was 158/107 taken this morning after she had been up for a while.  Patient states that she had a blood pressure and high as 190/?.  Patient was unsure of diastolic reading, no other symptoms, states that PT has also commented at times that her BP is high      HPI    Patient notes she has been checking her blood pressure at home that it has been elevated 190's /100's when checking her heart rate.  Patient does have a history of atrial fibrillation however she states she did not realize this.  She denies chest pain, irregular heart rhythm or heart palpitations.  Denies headache.  She states that she does have dizziness that she is being treated for through  physical therapy and feels that she may be more dizzy than normal.  Denies nausea vomiting diarrhea states she has been drinking fluids throughout the day.      Review of Systems  Constitutional, HEENT, cardiovascular, pulmonary, gi and gu systems are negative, except as otherwise noted.      Objective    BP (!) 170/80   Pulse 99   Temp 97.9  F (36.6  C) (Tympanic)   Resp 18   Wt 81.6 kg (180 lb)   SpO2 98%   BMI 30.17 kg/m    Physical Exam   GENERAL: healthy, alert and no distress  EYES: Eyes grossly normal to inspection, PERRL and conjunctivae and sclerae normal  HENT: ear canals and TM's normal, nose and mouth without ulcers or lesions  NECK: no adenopathy, no asymmetry, masses, or scars and thyroid normal to palpation  RESP: lungs clear to auscultation - no rales, rhonchi or wheezes  CV: regular rate and rhythm, normal S1 S2, no S3 or S4, no murmur, click or rub, no peripheral edema and peripheral pulses strong  ABDOMEN: soft, nontender, no hepatosplenomegaly, no masses and bowel sounds normal  MS: no gross musculoskeletal defects noted, no edema  SKIN: no suspicious lesions or rashes  NEURO: Normal strength and tone, mentation intact and speech normal

## 2023-09-11 NOTE — TELEPHONE ENCOUNTER
Nurse Triage SBAR    Is this a 2nd Level Triage? NO    Situation:  blood pressure    Background:  patient's last blood pressure was 158/107 taken this morning after she had been up for a while.  Patient states that she had a blood pressure and high as 190/?.  Patient was unsure of diastolic reading.  Patient denies shortness of breath, swelling, frequent headaches.  Patient states that she does not trust her blood pressure machine.  Patient goes to physical therapy and they have commented to her that her blood pressures have been getting higher.     Assessment:  elevated blood pressures    Protocol Recommended Disposition:   See PCP Within 3 Days    Recommendation:  patient verbalized understanding of care advice    Brina Escobedo RN on 9/11/2023 at 5:15 PM      Does the patient meet one of the following criteria for ADS visit consideration? No      Reason for Disposition   Systolic BP  >= 160 OR Diastolic >= 100    Additional Information   Negative: Difficult to awaken or acting confused (e.g., disoriented, slurred speech)   Negative: SEVERE difficulty breathing (e.g., struggling for each breath, speaks in single words)   Negative: [1] Weakness of the face, arm or leg on one side of the body AND [2] new-onset   Negative: [1] Numbness (i.e., loss of sensation) of the face, arm or leg on one side of the body AND [2] new-onset   Negative: [1] Chest pain lasts > 5 minutes AND [2] history of heart disease (i.e., heart attack, bypass surgery, angina, angioplasty, CHF)   Negative: [1] Chest pain AND [2] took nitrogylcerin AND [3] pain was not relieved   Negative: Sounds like a life-threatening emergency to the triager   Negative: [1] Systolic BP  >= 160 OR Diastolic >= 100 AND [2] cardiac (e.g., breathing difficulty, chest pain) or neurologic symptoms (e.g., new-onset blurred or double vision, unsteady gait)   Negative: [1] Pregnant 20 or more weeks (or postpartum < 6 weeks) AND [2] new hand or face swelling   Negative:  [1] Pregnant 20 or more weeks (or postpartum < 6 weeks) AND [2] Systolic BP >= 160 OR Diastolic >= 110   Negative: [1] Systolic BP  >= 200 OR Diastolic >= 120 AND [2] having NO cardiac or neurologic symptoms   Negative: [1] Pregnant 20 or more weeks (or postpartum < 6 weeks) AND [2] Systolic BP  >= 140 OR Diastolic >= 90   Negative: [1] Systolic BP  >= 180 OR Diastolic >= 110 AND [2] missed most recent dose of blood pressure medication   Negative: Systolic BP  >= 180 OR Diastolic >= 110   Negative: Ran out of BP medications    Protocols used: Blood Pressure - High-A-

## 2023-09-11 NOTE — PATIENT INSTRUCTIONS
Recommend that you monitor your blood pressure and heart rate 2-3 times per day and document these numbers.     Follow up with you primary care provider for this may be a virtual visit if unable to make an in person visit right away.

## 2023-09-20 ENCOUNTER — THERAPY VISIT (OUTPATIENT)
Dept: PHYSICAL THERAPY | Facility: CLINIC | Age: 83
End: 2023-09-20
Attending: NURSE PRACTITIONER
Payer: COMMERCIAL

## 2023-09-20 DIAGNOSIS — R42 DIZZINESS: Primary | ICD-10-CM

## 2023-09-20 DIAGNOSIS — R29.6 FALLS FREQUENTLY: ICD-10-CM

## 2023-09-20 PROCEDURE — 97116 GAIT TRAINING THERAPY: CPT | Mod: GP | Performed by: PHYSICAL THERAPIST

## 2023-09-20 PROCEDURE — 97112 NEUROMUSCULAR REEDUCATION: CPT | Mod: GP | Performed by: PHYSICAL THERAPIST

## 2023-09-21 NOTE — PROGRESS NOTES
09/20/23 0500   PT Goal 1   Goal Identifier leg strength/control   Goal Description She will be able to do 8 reps of STS from 20 inch ht chair consistently   Rationale to maximize safety and independence with performance of ADLs and functional tasks;to maximize safety and independence within the home;to maximize safety and independence within the community   Goal Progress not met, not retested. Yong does this ex at home from her recliner sometimes.   Target Date 11/16/23   PT Goal 2   Goal Identifier HEP   Goal Description She will be able to do 3-5 exercises a day for her strength, balance and activity tolerance   Rationale to maximize safety and independence with performance of ADLs and functional tasks;to maximize safety and independence within the home;to maximize safety and independence within the community   Target Date 11/16/23   Goal Progress not met. she is not following through with HEP much.   PT Goal 3   Goal Identifier DHI   Goal Description Improve on DHI (self rating of symptoms) from 72/100 to 50/100 or less   Rationale to maximize safety and independence within the home;to maximize safety and independence with performance of ADLs and functional tasks;to maximize safety and independence within the community   Target Date 11/16/23   Goal Progress not met. not retested but she reprots not feeling dizziness more off balance. Not getting better.   Date Met 09/20/23   PT Goal 4   Goal Identifier gait with head motion   Goal Description She will be able to walk with 2ww or 4ww with head motion with only minimal change in her gait speed or quality AND she will feel comfortable   Rationale to maximize safety and independence with performance of ADLs and functional tasks;to maximize safety and independence within the home;to maximize safety and independence with transportation;to maximize safety and independence with self cares   Target Date 11/16/23   Goal Progress not met, with 2ww she does have minimal  symptoms but she slows down   Date Met 23     She was seen for 5 visits. Balance/falls is more of the problem then dizziness. She will continue to fall.Multifactorial balance problem.     DISCHARGE  Reason for Discharge: Patient chooses to discontinue therapy.    Equipment Issued: none    Discharge Plan: Other services: recommend she have more PT now and after she moves to assisted living. Needs regular therapy since she does not do the HEP consistently.    Referring Provider:  Marisol Dueñas DPT, MPT, NCS  Physical Therapist   Board Certified Neurologic Clinical Specialist     [facility] [place] [facility]   [address] [place] [address]   [email or web address]   [phone]   Clinic: [phone] //   Fax: [phone]

## 2023-10-09 ENCOUNTER — TELEPHONE (OUTPATIENT)
Dept: FAMILY MEDICINE | Facility: CLINIC | Age: 83
End: 2023-10-09
Payer: COMMERCIAL

## 2023-10-09 DIAGNOSIS — G50.0 TRIGEMINAL NEURALGIA: ICD-10-CM

## 2023-10-09 NOTE — TELEPHONE ENCOUNTER
Patients daughter brought in paperwork to be filled out for her Nursing home. When finished please fax to [facility] at [phone]-

## 2023-10-10 ENCOUNTER — TELEPHONE (OUTPATIENT)
Dept: FAMILY MEDICINE | Facility: CLINIC | Age: 83
End: 2023-10-10
Payer: COMMERCIAL

## 2023-10-10 NOTE — TELEPHONE ENCOUNTER
Can you print her letter to be co signed? I have not received an electronic note to co sign. RAQUEL Mcneil, FNP-BC

## 2023-10-12 ENCOUNTER — TELEPHONE (OUTPATIENT)
Dept: FAMILY MEDICINE | Facility: CLINIC | Age: 83
End: 2023-10-12
Payer: COMMERCIAL

## 2023-10-16 NOTE — TELEPHONE ENCOUNTER
Patient Returning Call    Reason for call:  She is calling to get an update on the forms that were dropped off last week. Please contact her with the status     Please contact her at [phone]     She is wanting someone to contact her about if this has been faxed.    Tiffany Gallegos,

## 2023-10-17 ENCOUNTER — MEDICAL CORRESPONDENCE (OUTPATIENT)
Dept: FAMILY MEDICINE | Facility: CLINIC | Age: 83
End: 2023-10-17
Payer: COMMERCIAL

## 2023-10-17 RX ORDER — CARBAMAZEPINE 200 MG/1
200 TABLET, EXTENDED RELEASE ORAL 2 TIMES DAILY
COMMUNITY
Start: 2023-10-17

## 2023-10-17 NOTE — TELEPHONE ENCOUNTER
Form filled out signed faxed and sent to scanning.  Also informed needed visit to fill out POLST form will call back to set up.

## 2023-10-17 NOTE — TELEPHONE ENCOUNTER
Forms completed to the best of my ability.  She will need to come in to discuss POLST form. RAQUEL Mcneil, FNP-BC

## 2023-10-25 ENCOUNTER — DOCUMENTATION ONLY (OUTPATIENT)
Dept: FAMILY MEDICINE | Facility: CLINIC | Age: 83
End: 2023-10-25

## 2023-10-25 ENCOUNTER — OFFICE VISIT (OUTPATIENT)
Dept: FAMILY MEDICINE | Facility: CLINIC | Age: 83
End: 2023-10-25
Payer: COMMERCIAL

## 2023-10-25 VITALS
BODY MASS INDEX: 31 KG/M2 | OXYGEN SATURATION: 97 % | WEIGHT: 181.6 LBS | RESPIRATION RATE: 16 BRPM | TEMPERATURE: 97.8 F | DIASTOLIC BLOOD PRESSURE: 84 MMHG | HEIGHT: 64 IN | HEART RATE: 95 BPM | SYSTOLIC BLOOD PRESSURE: 128 MMHG

## 2023-10-25 DIAGNOSIS — Z78.9 POLST (PHYSICIAN ORDERS FOR LIFE-SUSTAINING TREATMENT): ICD-10-CM

## 2023-10-25 DIAGNOSIS — R53.83 FATIGUE, UNSPECIFIED TYPE: Primary | ICD-10-CM

## 2023-10-25 DIAGNOSIS — R53.83 FATIGUE, UNSPECIFIED TYPE: ICD-10-CM

## 2023-10-25 DIAGNOSIS — R41.89 COGNITIVE AND BEHAVIORAL CHANGES: Primary | ICD-10-CM

## 2023-10-25 DIAGNOSIS — Z29.11 NEED FOR VACCINATION AGAINST RESPIRATORY SYNCYTIAL VIRUS: ICD-10-CM

## 2023-10-25 DIAGNOSIS — R46.89 COGNITIVE AND BEHAVIORAL CHANGES: Primary | ICD-10-CM

## 2023-10-25 DIAGNOSIS — Z23 NEED FOR SHINGLES VACCINE: ICD-10-CM

## 2023-10-25 DIAGNOSIS — M53.3 SACROILIAC JOINT PAIN: ICD-10-CM

## 2023-10-25 LAB
BASOPHILS # BLD AUTO: 0.1 10E3/UL (ref 0–0.2)
BASOPHILS NFR BLD AUTO: 1 %
CALCIUM SERPL-MCNC: 9.5 MG/DL (ref 8.8–10.2)
EOSINOPHIL # BLD AUTO: 0.7 10E3/UL (ref 0–0.7)
EOSINOPHIL NFR BLD AUTO: 7 %
ERYTHROCYTE [DISTWIDTH] IN BLOOD BY AUTOMATED COUNT: 12.3 % (ref 10–15)
FERRITIN SERPL-MCNC: 809 NG/ML (ref 11–328)
HCT VFR BLD AUTO: 38.4 % (ref 35–47)
HGB BLD-MCNC: 12.8 G/DL (ref 11.7–15.7)
IMM GRANULOCYTES # BLD: 0 10E3/UL
IMM GRANULOCYTES NFR BLD: 0 %
IRON BINDING CAPACITY (ROCHE): 236 UG/DL (ref 240–430)
IRON SATN MFR SERPL: 49 % (ref 15–46)
IRON SERPL-MCNC: 116 UG/DL (ref 37–145)
LYMPHOCYTES # BLD AUTO: 3.5 10E3/UL (ref 0.8–5.3)
LYMPHOCYTES NFR BLD AUTO: 34 %
MCH RBC QN AUTO: 33.6 PG (ref 26.5–33)
MCHC RBC AUTO-ENTMCNC: 33.3 G/DL (ref 31.5–36.5)
MCV RBC AUTO: 101 FL (ref 78–100)
MONOCYTES # BLD AUTO: 1 10E3/UL (ref 0–1.3)
MONOCYTES NFR BLD AUTO: 10 %
NEUTROPHILS # BLD AUTO: 5 10E3/UL (ref 1.6–8.3)
NEUTROPHILS NFR BLD AUTO: 49 %
PLATELET # BLD AUTO: 256 10E3/UL (ref 150–450)
RBC # BLD AUTO: 3.81 10E6/UL (ref 3.8–5.2)
VIT D+METAB SERPL-MCNC: 39 NG/ML (ref 20–50)
WBC # BLD AUTO: 10.3 10E3/UL (ref 4–11)

## 2023-10-25 PROCEDURE — 83550 IRON BINDING TEST: CPT | Performed by: NURSE PRACTITIONER

## 2023-10-25 PROCEDURE — 82306 VITAMIN D 25 HYDROXY: CPT | Performed by: NURSE PRACTITIONER

## 2023-10-25 PROCEDURE — 99214 OFFICE O/P EST MOD 30 MIN: CPT | Performed by: NURSE PRACTITIONER

## 2023-10-25 PROCEDURE — 83540 ASSAY OF IRON: CPT | Performed by: NURSE PRACTITIONER

## 2023-10-25 PROCEDURE — 36415 COLL VENOUS BLD VENIPUNCTURE: CPT | Performed by: NURSE PRACTITIONER

## 2023-10-25 PROCEDURE — 82310 ASSAY OF CALCIUM: CPT | Performed by: NURSE PRACTITIONER

## 2023-10-25 PROCEDURE — 85025 COMPLETE CBC W/AUTO DIFF WBC: CPT | Performed by: NURSE PRACTITIONER

## 2023-10-25 PROCEDURE — 82728 ASSAY OF FERRITIN: CPT | Performed by: NURSE PRACTITIONER

## 2023-10-25 RX ORDER — RESPIRATORY SYNCYTIAL VIRUS VACCINE 120MCG/0.5
0.5 KIT INTRAMUSCULAR ONCE
Qty: 1 EACH | Refills: 0 | Status: SHIPPED | OUTPATIENT
Start: 2023-10-25 | End: 2023-10-25

## 2023-10-25 RX ORDER — CETIRIZINE HYDROCHLORIDE 10 MG/1
10 TABLET ORAL DAILY PRN
COMMUNITY

## 2023-10-25 ASSESSMENT — PAIN SCALES - GENERAL: PAINLEVEL: MODERATE PAIN (5)

## 2023-10-25 NOTE — PROGRESS NOTES
Assessment & Plan     POLST (Physician Orders for Life-Sustaining Treatment)  -Completed and given to patient copy scanned into chart    Cognitive and behavioral changes    - Adult Neurology  Referral; Future    Fatigue, unspecified type    - Vitamin D Deficiency; Future  - Iron and iron binding capacity; Future  - Ferritin; Future  - CBC with platelets and differential; Future  - Vitamin D Deficiency  - Iron and iron binding capacity  - Ferritin  - CBC with platelets and differential    Sacroiliac joint pain    - diclofenac (VOLTAREN) 1 % topical gel; Apply 4 g topically 4 times daily    Need for shingles vaccine    - zoster vaccine recombinant adjuvanted (SHINGRIX) injection; Inject 0.5 mLs into the muscle once for 1 dose Pharmacist administered    Need for vaccination against respiratory syncytial virus    - respiratory syncytial virus vaccine, bivalent (ABRYSVO) injection; Inject 0.5 mLs into the muscle once for 1 dose    Review of external notes as documented elsewhere in note  Ordering of each unique test  Prescription drug management  30 minutes spent by me on the date of the encounter doing chart review, history and exam, documentation and further activities per the note     Regular exercise  See Patient Instructions    JOSE E GODINEZ  [facility] TYLOR Gamboa is a 83 year old, presenting for the following health issues:  Forms        10/25/2023     9:37 AM   Additional Questions   Roomed by Cleo   Accompanied by daughter in  - Margie         10/25/2023     9:37 AM   Patient Reported Additional Medications   Patient reports taking the following new medications Benadryl-discussed using alternative allergy medication such as Zyrtec as Benadryl can cause fatigue and patient reports fatigue also on the beers list due to risk for falls and sedation.  Patient reports she likes it because it helps her fall back asleep in the morning.  Discussed melatonin      Patient and daughter-in-law came in to discuss POLST form.  Questions answered by patient; agreed on by daughter-in-law Margie and writer.  Copy of POLST forms scanned into chart and given to patient to bring to assisted living facility.    They would like referral for neurology for cognitive memory testing.  Patient more frequently forgetful at times.  Issues remembering situations that just occurred.  She has a neurologist for her trigeminal neuralgia; discussed bringing this to her current neurologist attention versus referral.  They would like a referral.  Daughter-in-law would like MyChart proxy to access patient's chart.    She reports issues with low lateral pelvic pain-she is pointing to the SI joint area.  She reports it does not feel like sciatica.  Discussed treatment of SI joint pain including physical therapy.  Patient does not feel she can go to physical therapy she will be moving into assisted living facility beginning of November.  She is excited to move into assisted living facility to meet friends and be part of activities.  Discussed need to walk and exercise regularly to maintain her independence.  Discussed doing physical therapy for SI joint pain off of YouTube.  If doing twice a day every day symptoms will improve.  Discussed SI joint belt and Voltaren gel.  She is interested in trying these.    She reports dizziness is intermittent and not prolonged.  Discussed causes for dizziness including dehydration.  Advised to sit on edge of bed if feeling dizzy before getting up.  Discussed physical therapy exercises to help with dizziness.  She reports meclizine makes her very tired.  She only will take this if dizziness is significant.    They would like labs done for fatigue including vitamin D, iron, thyroid, ferritin, CBC.  She reports her mental health is doing well.      CONSTITUTIONAL: NEGATIVE for fever, chills, change in weight  INTEGUMENTARY/SKIN: NEGATIVE for worrisome rashes, moles or  "lesions  EYES: NEGATIVE for vision changes or irritation  ENT: NEGATIVE for ear, mouth and throat problems  RESP: NEGATIVE for significant cough or SOB  CV: NEGATIVE for chest pain, palpitations or peripheral edema  GI: NEGATIVE for nausea, abdominal pain, heartburn, or change in bowel habits   male: negative for dysuria, hematuria, decreased urinary stream, erectile dysfunction, urethral discharge  MUSCULOSKELETAL: POSITIVE for SI joint pain  NEURO: NEGATIVE for weakness, paresthesias POSITIVE for vertigo  ENDOCRINE: NEGATIVE for temperature intolerance, skin/hair changes  HEME/ALLERGY/IMMUNE: NEGATIVE for bleeding problems  PSYCHIATRIC: NEGATIVE for changes in mood or affect    OBJECTIVE:   /84   Pulse 95   Temp 97.8  F (36.6  C) (Temporal)   Resp 16   Ht 1.636 m (5' 4.41\")   Wt 82.4 kg (181 lb 9.6 oz)   SpO2 97%   BMI 30.78 kg/m      GENERAL: Healthy, alert and no distress  EYES: Eyes grossly normal to inspection.  No discharge or erythema, or obvious scleral/conjunctival abnormalities.  RESP: No audible wheeze, cough, or visible cyanosis.  No visible retractions or increased work of breathing.    SKIN: Visible skin clear. No significant rash, abnormal pigmentation or lesions.  NEURO: Cranial nerves grossly intact.  Mentation and speech appropriate for age.  PSYCH: Mentation appears normal, affect normal/bright, judgement and insight intact, normal speech and appearance well-groomed.       Diagnostic Test Results:  Labs reviewed in Epic  See orders  Answers submitted by the patient for this visit:  General Questionnaire (Submitted on 10/25/2023)  Chief Complaint: Chronic problems general questions HPI Form  What is the reason for your visit today? : polst  How many servings of fruits and vegetables do you eat daily?: 0-1  On average, how many sweetened beverages do you drink each day (Examples: soda, juice, sweet tea, etc.  Do NOT count diet or artificially sweetened beverages)?: 0  How many " minutes a day do you exercise enough to make your heart beat faster?: 9 or less  How many days a week do you exercise enough to make your heart beat faster?: 3 or less  How many days per week do you miss taking your medication?: 1

## 2023-10-25 NOTE — RESULT ENCOUNTER NOTE
Slade Gamboa,    Thank you for your recent office visit.    Here are your recent results.    Normal CBC and hemoglobin.  Your iron level is normal.  Your ferritin level is elevated.  Your calcium level is normal.  Your vitamin D level is normal.  Feel free to contact me via [facility] or call the clinic at [phone].    Sincerely,    RAQUEL Mcneil, FNP-BC

## 2023-10-26 ENCOUNTER — DOCUMENTATION ONLY (OUTPATIENT)
Dept: OTHER | Facility: CLINIC | Age: 83
End: 2023-10-26
Payer: COMMERCIAL

## 2023-11-07 ENCOUNTER — TELEPHONE (OUTPATIENT)
Dept: FAMILY MEDICINE | Facility: CLINIC | Age: 83
End: 2023-11-07
Payer: COMMERCIAL

## 2023-11-07 NOTE — TELEPHONE ENCOUNTER
Forms received from:  [facility]   Phone number listed: [phone]   Fax listed: [phone]  Date received: 11/7/23  Form description: MD orders  Once forms are completed, please return to [facility] via fax.  Is patient requesting to be contacted when forms are completed: na  Phone: na  Form placed:  Marisol Young

## 2023-11-09 ENCOUNTER — MEDICAL CORRESPONDENCE (OUTPATIENT)
Dept: HEALTH INFORMATION MANAGEMENT | Facility: CLINIC | Age: 83
End: 2023-11-09
Payer: COMMERCIAL

## 2023-12-05 ENCOUNTER — TELEPHONE (OUTPATIENT)
Dept: FAMILY MEDICINE | Facility: CLINIC | Age: 83
End: 2023-12-05
Payer: COMMERCIAL

## 2023-12-05 DIAGNOSIS — R29.6 RECURRENT FALLS: ICD-10-CM

## 2023-12-05 DIAGNOSIS — M62.81 GENERALIZED MUSCLE WEAKNESS: Primary | ICD-10-CM

## 2023-12-05 NOTE — TELEPHONE ENCOUNTER
New PT order placed. I was notified that he POLST form is not valid: If section A is marked: attempt resuscitation, then B has to be Full treatment; not selective treatment as we marked. Can they verify if patient wants resuscitation or no resuscitation with selective treatment?  We can complete a new form for her. Thanks. Marisol Kamara, RAQUEL, FNP-BC

## 2023-12-05 NOTE — TELEPHONE ENCOUNTER
La YOON from [facility] was calling in regards to PT referral placed today. She stated the the diagnosis put down for reason why PT referral was placed medicare is flagging as symptom codes. They need a concrete diagnosis to what could be causing the generalized muscle weakness and recurrent falls for medicare to approve.     La stated she saw patient has peripheral neuropathy listed and felt that could qualify patient for PT services through medicare if provider agrees.      La stated we could either refax order or someone from the care team can call her back and give her a verbal for the dx.      Fax number for [facility] is [phone] if needed.    Ok to leave detailed VM if calling La back.     Tiny Pacheco RN on 12/5/2023 at 3:50 PM

## 2023-12-05 NOTE — TELEPHONE ENCOUNTER
Please call back and give verbal approval for peripheral neuropathy for physical therapy. RAQUEL Mcneil, FNP-BC

## 2023-12-05 NOTE — TELEPHONE ENCOUNTER
Received call from [facility] with [facility].    Patient recently moved in; at the time of admission she refused PT (which was ordered d/t recurrent falls/weakness). She states that she is now open to it but home care PT state they need a new referral written, as the current order is >2 weeks old. She is wondering if new referral could be written; she specifically requested that for insurance to cover it, it needs to be attached to specific diagnosis, instead of symptoms. Written order by provider can be faxed to below #.    Fax: [phone]    YEN Valderrama RN  [facility], [facility]

## 2023-12-05 NOTE — TELEPHONE ENCOUNTER
Routing to team  please fax referral to number listed    PCP    RN spoke with assisted living.    They just had CC with patient and family.    They reviewed POLST and patient does want CPR but with the limitations and treatment listed in section E.    Serg Chen, RN, BSN, PHN  [facility] listed on back

## 2023-12-05 NOTE — TELEPHONE ENCOUNTER
I called number provided for La with home care, no answer, reached her secure voicemail so left detailed message advising of provider's response to use peripheral neuropathy for PT order diagnosis.    BE nurse call back number provided in case of questions.    Cele BELL RN  [facility] Triage

## 2023-12-07 DIAGNOSIS — M62.81 GENERALIZED MUSCLE WEAKNESS: Primary | ICD-10-CM

## 2023-12-07 DIAGNOSIS — R53.83 FATIGUE, UNSPECIFIED TYPE: ICD-10-CM

## 2023-12-07 DIAGNOSIS — R29.6 RECURRENT FALLS: ICD-10-CM

## 2023-12-07 NOTE — PROGRESS NOTES
Called patient and left a voicemail to return our call to the clinic.       Tiny Pacheco RN on 12/7/2023 at 1:11 PM

## 2023-12-07 NOTE — PROGRESS NOTES
Home care referral for physical therapy placed.    I need to complete a new POLST form for patient can you clarify her preference so it can be completed?  If she chooses attempt resuscitation on the form in section A,  then we have to silvino full treatment in section B (use intubation, advanced airway interventions, mechanical ventilation as indicated.  Transfer to hospital and/or intensive care unit if indicated all patients will receive comfort focused treatments).    If she only wants selective treatments in section B (use medical treatment, antibiotics, IV fluids and cardiac monitor as indicated.  No intubation, advanced airways or mechanical ventilation.  May consider less invasive airway support such as CPAP or BiPAP.  Transfer to hospital if indicated.  Generally avoid the intensive care unit.  All patients will receive comfort focused treatment), then in section A we can only Silvino do not attempt resuscitation/DNR.    RAQUEL Mcneil, FNP-BC

## 2023-12-12 ENCOUNTER — MEDICAL CORRESPONDENCE (OUTPATIENT)
Dept: HEALTH INFORMATION MANAGEMENT | Facility: CLINIC | Age: 83
End: 2023-12-12

## 2023-12-12 NOTE — PROGRESS NOTES
Called patient. Did they answer the phone: No, left a message on voicemail to return call to the [facility]] at [phone], and to ask for any available triage nurse.    Elin RN BSN  Triage Nurse  [facility]

## 2023-12-14 NOTE — PROGRESS NOTES
Called patient. Did they answer the phone: No, left a message on voicemail to return call to the [facility] at [phone], and to ask for any available triage nurse.    Elin RN BSN  Triage Nurse  [facility]

## 2023-12-14 NOTE — PROGRESS NOTES
"Patient called back.   I tried to explain to her the issues and choices that are involved in a POLST, she became frustrated and feels like \"those questions were already all filled out\" and her facility has the form.    I do see the original message is from 12/7/23, perhaps this has been resolved?   If not, it might be better to schedule a phone visit to go over this?    Routed to Marisol Kamara to address, is this clarification still needed?   If so, a phone visit would be indicated.    Cele BELL RN  [facility] Triage    "

## 2023-12-15 NOTE — PROGRESS NOTES
Attempted to call patient with number on file to relay provider's message below with no answer, left voicemail to call clinic back at [phone].    Dena Barber RN on 12/15/2023 at 9:51 AM

## 2023-12-15 NOTE — PROGRESS NOTES
I received a message from [employer]oring [employer] that the form is invalid because we have to fill it out like I stated in my first message. RAQUEL Mcneil, FNP-BC

## 2023-12-18 ENCOUNTER — TELEPHONE (OUTPATIENT)
Dept: FAMILY MEDICINE | Facility: CLINIC | Age: 83
End: 2023-12-18

## 2023-12-18 DIAGNOSIS — R29.6 FALLS FREQUENTLY: ICD-10-CM

## 2023-12-18 DIAGNOSIS — I10 HTN, GOAL BELOW 150/90: Primary | ICD-10-CM

## 2023-12-18 RX ORDER — LISINOPRIL 2.5 MG/1
2.5 TABLET ORAL DAILY
Qty: 90 TABLET | Refills: 1 | Status: SHIPPED | OUTPATIENT
Start: 2023-12-18 | End: 2024-01-03

## 2023-12-18 NOTE — TELEPHONE ENCOUNTER
Low-dose blood pressure medication sent in for desired blood pressure less than 150/90.  Let us know if patient experiences  chronic cough or dizziness. RAQUEL Mcneil, FNP-BC

## 2023-12-18 NOTE — TELEPHONE ENCOUNTER
[facility]ia PT [facility] [id number]    158/84 - at rest     Last week 146/89     Asymptomatic     Ok with higher BP, if so need new parameters    Marisol Grace RN  [facility]

## 2023-12-18 NOTE — PROGRESS NOTES
"I spoke with Bee and explained the 2 options for the POLST to her.     Bee clearly stated that she    wants selective treatments in section B (use medical treatment, antibiotics, IV fluids and cardiac monitor as indicated.  No intubation, advanced airways or mechanical ventilation.  May consider less invasive airway support such as CPAP or BiPAP.  Transfer to hospital if indicated.  Generally avoid the intensive care unit.  All patients will receive comfort focused treatment), then in section A we can only Chon do not attempt resuscitation/DNR.     Patient states that she thought this was already noted in her chart.     She would like a copy of the completed POLST form mailed to her so that she can put it on her refrigerator.     I also encouraged patient to purchase a \"DNR\" bracelet and she thought that was a good idea that she will discuss with her family.     Shanthi Abdi RN BSN  [facility]     "

## 2023-12-19 ENCOUNTER — TELEPHONE (OUTPATIENT)
Dept: FAMILY MEDICINE | Facility: CLINIC | Age: 83
End: 2023-12-19
Payer: COMMERCIAL

## 2023-12-19 DIAGNOSIS — Z53.9 DIAGNOSIS NOT YET DEFINED: Primary | ICD-10-CM

## 2023-12-19 PROCEDURE — G0180 MD CERTIFICATION HHA PATIENT: HCPCS | Performed by: NURSE PRACTITIONER

## 2023-12-19 NOTE — TELEPHONE ENCOUNTER
Called gloria (PT)to relay pcp's message below for fyi of BP parameters. Gloria verbalized understanding.     RN called pt to relay pcp's message below. Pt states that she does not want to start BP lowering medication and wanting to do home care such as daily exercise (walking) and other home care to help lower bp without medications. RN offered pt an appointment with pcp and a BP recheck in clinic to discuss desired care plans. Pt declined an appointment at this time as she does not want to come to clinic.     RN advised pt to call clinic as there is a 24/7 nurse line available if she develops headache or dizziness. Pt verbalized good understanding and stated no further questions.     RN called Gloria (PT) again to inform her to pt's decision, Gloria states that she will continue to educate pt on BP goal of less than 150/90.    Do you want a telephone appointment with pt to discuss high BP care plan? Or ok to monitor?    Dena Barber RN on 12/19/2023 at 9:18 AM

## 2023-12-19 NOTE — TELEPHONE ENCOUNTER
Forms received from: [facility]    Phone number listed: [phone]   Fax listed: [phone]  Date received: 12/16/23  Form description: Home health certification & plan of care ID # [id number]  Once forms are completed, please return to  [facility] via fax.  Is patient requesting to be contacted when forms are completed: na  Phone: na  Form placed:  Marisol Young

## 2023-12-28 ENCOUNTER — TELEPHONE (OUTPATIENT)
Dept: FAMILY MEDICINE | Facility: CLINIC | Age: 83
End: 2023-12-28
Payer: COMMERCIAL

## 2023-12-28 DIAGNOSIS — R68.89 BLOOD PRESSURE ALTERATION: ICD-10-CM

## 2023-12-28 DIAGNOSIS — I10 BENIGN ESSENTIAL HYPERTENSION: Primary | ICD-10-CM

## 2023-12-28 NOTE — TELEPHONE ENCOUNTER
Gloria with PT calling again to report to PCP low and high BP readings this week.     Tuesday 12/26/23 /63, 118/61, 105/65    Today BP is high 165/103, 161/89 recheck, right arm 180/89  HR 75-77    No symptoms with readings today. Gloria stated patient did not want to take the low dose medication, please see 12/18/23 TE.     If any guidance is given Gloria and the patient would both like to be called. Gloria has a confidential voicemail.     Thank you,  Ángela Owen, RN

## 2024-01-03 RX ORDER — PROPRANOLOL HYDROCHLORIDE 10 MG/1
10 TABLET ORAL 3 TIMES DAILY PRN
Qty: 270 TABLET | Refills: 1 | Status: SHIPPED | OUTPATIENT
Start: 2024-01-03 | End: 2024-03-14

## 2024-01-03 NOTE — TELEPHONE ENCOUNTER
Pt returning call. RN relayed pcp's message below. Pt states that she does not want to check her BP TID for this medication and she does not have a BP machine to check her BP.      Pt requesting a low dose medication that she can take once a day.     Dena Barber RN on 1/3/2024 at 4:24 PM

## 2024-01-03 NOTE — TELEPHONE ENCOUNTER
If patient does not want to take blood pressure medication, there is nothing I can do to address her blood pressures. There are risks of uncontrolled high blood pressure including enlarged heart, stroke. There are risks of low blood pressure as well- including falls. She should eat a no added salt diet. RAQUEL Mcneil, FNP-BC

## 2024-01-03 NOTE — TELEPHONE ENCOUNTER
[facility] [phone].     Please review message below from PCP.     Message left for Gloria to call the clinic.   Her voicemail did not state her title or agency, so will need to speak with in person.     Shanthi [facility]

## 2024-01-03 NOTE — TELEPHONE ENCOUNTER
RN left message for patient to return call to clinic [phone]    Fabiana Yu RN on 1/3/2024 at 2:17 PM

## 2024-01-03 NOTE — TELEPHONE ENCOUNTER
Gloria calling back.  Relayed provider's message.  Per Gloria, patient was not interested in starting BP med initially but did decide she was open to it.  However, due to concerns about some lower BP numbers in normal range, Gloria unsure if provider would want to start on BP meds or evaluate patient further.  Gloria did advise patient and daughter in law to schedule a visit with provider to discuss further.     Routing to provider as FYI unless provider would like to start BP med now (patient had agreed)  Will need to call patient back directly if starting new med    Teena Webster RN  [facility]

## 2024-01-04 NOTE — TELEPHONE ENCOUNTER
Called patient, she is interested in talking to the cardiologist about this and would like the referral placed.     Thank you,  Ángela Owen RN

## 2024-01-04 NOTE — TELEPHONE ENCOUNTER
Please relay last message to nurse. If they want referral to cardiology let me know. RAQUEL Mcneil, FNP-BC

## 2024-01-04 NOTE — TELEPHONE ENCOUNTER
Marisol Sandhu, NP  Her, Dena, RN17 hours ago (4:39 PM)     MG  I do not think that is a good idea when she is occasionally having low blood pressures.  She may want to go to a cardiologist to discuss this. RAQUEL Mcneil, FNP-BC

## 2024-01-10 ENCOUNTER — TRANSFERRED RECORDS (OUTPATIENT)
Dept: HEALTH INFORMATION MANAGEMENT | Facility: CLINIC | Age: 84
End: 2024-01-10
Payer: COMMERCIAL

## 2024-01-12 ENCOUNTER — TELEPHONE (OUTPATIENT)
Dept: FAMILY MEDICINE | Facility: CLINIC | Age: 84
End: 2024-01-12
Payer: COMMERCIAL

## 2024-01-12 NOTE — TELEPHONE ENCOUNTER
Forms received from: [facility]    Phone number listed: [phone]   Fax listed: [phone]  Date received: 1/10/24  Form description:  Physician order ID # [id number]  Once forms are completed, please return to  [facility] via fax.  Is patient requesting to be contacted when forms are completed: na  Phone: na  Form placed:  Marisol Young

## 2024-01-16 ENCOUNTER — TELEPHONE (OUTPATIENT)
Dept: FAMILY MEDICINE | Facility: CLINIC | Age: 84
End: 2024-01-16

## 2024-01-16 ENCOUNTER — MEDICAL CORRESPONDENCE (OUTPATIENT)
Dept: HEALTH INFORMATION MANAGEMENT | Facility: CLINIC | Age: 84
End: 2024-01-16
Payer: COMMERCIAL

## 2024-01-16 NOTE — TELEPHONE ENCOUNTER
Received call from [facility] with [facility].     Reporting a fall on 1/14/24 - no injuries sustained.    She does not have any other information at this time.    Routing as FYI; no action needed.    BLAKE ValderramaN RN  [facility], [facility]

## 2024-01-31 ENCOUNTER — OFFICE VISIT (OUTPATIENT)
Dept: CARDIOLOGY | Facility: CLINIC | Age: 84
End: 2024-01-31
Attending: NURSE PRACTITIONER
Payer: COMMERCIAL

## 2024-01-31 VITALS
WEIGHT: 182 LBS | SYSTOLIC BLOOD PRESSURE: 139 MMHG | HEART RATE: 86 BPM | OXYGEN SATURATION: 98 % | DIASTOLIC BLOOD PRESSURE: 79 MMHG | BODY MASS INDEX: 30.84 KG/M2

## 2024-01-31 DIAGNOSIS — R68.89 BLOOD PRESSURE ALTERATION: Primary | ICD-10-CM

## 2024-01-31 PROCEDURE — 99203 OFFICE O/P NEW LOW 30 MIN: CPT | Performed by: INTERNAL MEDICINE

## 2024-01-31 ASSESSMENT — PATIENT HEALTH QUESTIONNAIRE - PHQ9: SUM OF ALL RESPONSES TO PHQ QUESTIONS 1-9: 7

## 2024-01-31 NOTE — LETTER
1/31/2024      RE: Bee Yepez  [address]  [facility] [address]       Dear Colleague,    Thank you for the opportunity to participate in the care of your patient, Bee Yepez, at the [facility]. Please see a copy of my visit note below.                                                                                                General Cardiology Clinic-[place]      Referring provider:Marisol Sandhu NP     HPI: Ms. Bee Yepez is a 83 year old  female with PMH significant for    -Hypertension ?  -Single episode of  paroxysmal A-fib in 2019 spontaneously converted to sinus rhythm in the setting of influenza  -Cognitive behavioral changes  -Falls at home  Patient is being seen today for concerns regarding blood pressure.  In July and August of last year she had blood pressure in the range of 150/74 mmHg.  Since then her blood pressure is normal. Patient tells me that she has been dealing with severe diarrhea lately.  This was very stressful for her.  In the meantime she is getting physical therapy to strengthen her lower body.  Physical therapist come to assisted living that she lives in.  They check her blood pressure and if it is high they decline physical therapy.  Lately her blood pressure has improved.  Daughter tells me that diarrhea is under control now and patient is less stressful.  Patient tells me that she is very much fatigued.  Legs feel weak.  She goes to exercise classes 2-3 times a week at her assisted living facility but otherwise she is very sedentary.  Denies chest pain, shortness of breath, palpitations, syncope.  She tells me that she feels dizzy at times.  Even now when she is sitting on the chair in clinic she feels like she is dizzy.  Patient was seen in cardiology in 2019 for single episode of A-fib in the setting of influenza but given potential infection triggering A-fib  she was not anticoagulated.  At that time she was recommended aspirin and metoprolol.  She is not on these medications at this time. It appears she has not had any recurrent A-fib since 2019.  No recent cardiac workup.  Patient is not on any cardiac medications.    Medications, personal, family, and social history reviewed with patient and revised.    PAST MEDICAL HISTORY:  Past Medical History:   Diagnosis Date    Ex-smoker     3 PPD for 25 years ago, quit in 1985.    Family history of breast cancer     sister at age 45    Family history of colon cancer     sister in her 60s    Osteopenia     per patient report.       CURRENT MEDICATIONS:  Current Outpatient Medications   Medication Sig Dispense Refill    acetaminophen (TYLENOL) 500 MG tablet Take 500-1,000 mg by mouth every 6 hours as needed for mild pain      calcium carbonate-vitamin D 600-400 MG-UNIT CHEW Take 1 chew tab by mouth 2 times daily For low bone density      carBAMazepine (TEGRETOL XR) 200 MG 12 hr tablet Take 1 tablet (200 mg) by mouth 2 times daily For trigeminal neuralgia      cetirizine (ZYRTEC) 10 MG tablet Take 10 mg by mouth daily as needed for allergies      Cyanocobalamin (B-12 PO) One tablet by mouth daily for b12 deficiency      diclofenac (VOLTAREN) 1 % topical gel Apply 4 g topically 4 times daily 350 g 11    magnesium 250 MG tablet Take 1 tablet by mouth 2 times daily For muscle cramps      meclizine (ANTIVERT) 25 MG tablet Take 1 tablet (25 mg) by mouth 3 times daily as needed for dizziness (Patient not taking: Reported on 10/25/2023) 90 tablet 1    melatonin 5 MG CAPS Take by mouth nightly as needed      propranolol (INDERAL) 10 MG tablet Take 1 tablet (10 mg) by mouth 3 times daily as needed (give if BP > 150/90 at rest.) 270 tablet 1       PAST SURGICAL HISTORY:  Past Surgical History:   Procedure Laterality Date    CHOLECYSTECTOMY      in the 1970s    TUBAL LIGATION         ALLERGIES:     Allergies   Allergen Reactions    Seasonal  Allergies Other (See Comments)     Sneezing and congestion        FAMILY HISTORY:  Family History   Problem Relation Age of Onset    Coronary Artery Disease Father         had a pacemaker    Breast Cancer Sister          at age 45    Colon Cancer Sister         Dx in her 60s    Lung Cancer Sister         Dx in her 60s    Diabetes No family hx of          SOCIAL HISTORY:  Social History     Tobacco Use    Smoking status: Former    Smokeless tobacco: Never   Vaping Use    Vaping Use: Never used   Substance Use Topics    Alcohol use: No     Alcohol/week: 0.0 standard drinks of alcohol    Drug use: No       ROS:   Constitutional: No fever, chills, or sweats. Weight stable.   Cardiovascular: As per HPI.       Exam:  /79 (BP Location: Left arm, Patient Position: Chair, Cuff Size: Adult Large)   Pulse 86   Wt 82.6 kg (182 lb)   SpO2 98%   BMI 30.84 kg/m    GENERAL APPEARANCE: alert and no distress  HEENT: no icterus, no central cyanosis  LYMPH/NECK: no adenopathy, no asymmetry, JVP not elevated  RESPIRATORY: lungs clear to auscultation - no rales, rhonchi or wheezes, no use of accessory muscles, no retractions, respirations are unlabored, normal respiratory rate  CARDIOVASCULAR: regular rhythm, normal S1, S2, no S3 or S4 and no murmur, click or rub, precordium quiet with normal PMI.  GI: soft, non tender  EXTREMITIES: no edema  NEURO: alert, normal speech,and affect  SKIN: no ecchymoses, no rashes     I have reviewed the labs and personally reviewed the imaging below and made my comment in the assessment and plan.    Labs:  CBC RESULTS:   Lab Results   Component Value Date    WBC 10.3 10/25/2023    WBC 9.0 2020    RBC 3.81 10/25/2023    RBC 3.87 2020    HGB 12.8 10/25/2023    HGB 12.8 2020    HCT 38.4 10/25/2023    HCT 38.2 2020     (H) 10/25/2023    MCV 99 2020    MCH 33.6 (H) 10/25/2023    MCH 33.1 (H) 2020    MCHC 33.3 10/25/2023    MCHC 33.5 2020    RDW  12.3 10/25/2023    RDW 12.0 06/09/2020     10/25/2023     06/09/2020       BMP RESULTS:  Lab Results   Component Value Date     06/09/2020    POTASSIUM 4.4 06/09/2020    CHLORIDE 101 06/09/2020    CO2 26 06/09/2020    ANIONGAP 8 06/09/2020     (H) 07/13/2023     (H) 02/11/2021    BUN 14 06/09/2020    CR 0.87 06/09/2020    GFRESTIMATED 62 06/09/2020    GFRESTBLACK 72 06/09/2020    SACHA 9.5 10/25/2023    SACHA 8.8 06/09/2020      Echocardiogram 2019  Final Conclusion    Technically difficult study - contrast was used to enhance endocardial definition due to   suboptimal image quality.    Normal left ventricular ejection fraction estimated at 55-60%.    Aortic valve sclerosis without significant stenosis.    Normal estimated PA pressures on this study.    Normal IVC size.    Estimated EF: 55-60%     Assessment :   Patient is being seen today with regards to concerns related to blood pressure.  Daughter tells me that she has been dealing with diarrhea lately which was very stressful for her. This has settled now.  Blood pressure is normal in clinic today for her age.  I do not think she needs blood pressure treatment.  I discussed this with the daughter and the patient.  They are agreeable.  Patient tells me that in the past she was offered blood pressure treatment which she declined.    Recommendations:  -Do not recommend blood pressure treatment now.  Blood pressure in clinic is normal (she brought a log.  Latest home measurements are normal as well)  -I think given her history of frequent falls, leg weakness she might be at risk of orthostatic hypotension from blood pressure treatment therefore I think she needs a higher cut off like 150/90 mmHg to initiate blood pressure treatment.    Return to clinic as needed.    Total time spent today for this visit is 40 minutes including precharting, face-to-face clinic visit, review of labs/imaging and medical documentation.    Trino FONTANA  MD  [facility] Division of Cardiology  Pager [phone]      2-point gait

## 2024-01-31 NOTE — PROGRESS NOTES
General Cardiology Clinic-[place]      Referring provider:Marisol Sandhu NP     HPI: Ms. Bee Yepez is a 83 year old  female with PMH significant for    -Hypertension ?  -Single episode of  paroxysmal A-fib in 2019 spontaneously converted to sinus rhythm in the setting of influenza  -Cognitive behavioral changes  -Falls at home  Patient is being seen today for concerns regarding blood pressure.  In July and August of last year she had blood pressure in the range of 150/74 mmHg.  Since then her blood pressure is normal. Patient tells me that she has been dealing with severe diarrhea lately.  This was very stressful for her.  In the meantime she is getting physical therapy to strengthen her lower body.  Physical therapist come to assisted living that she lives in.  They check her blood pressure and if it is high they decline physical therapy.  Lately her blood pressure has improved.  Daughter tells me that diarrhea is under control now and patient is less stressful.  Patient tells me that she is very much fatigued.  Legs feel weak.  She goes to exercise classes 2-3 times a week at her assisted living facility but otherwise she is very sedentary.  Denies chest pain, shortness of breath, palpitations, syncope.  She tells me that she feels dizzy at times.  Even now when she is sitting on the chair in clinic she feels like she is dizzy.  Patient was seen in cardiology in 2019 for single episode of A-fib in the setting of influenza but given potential infection triggering A-fib she was not anticoagulated.  At that time she was recommended aspirin and metoprolol.  She is not on these medications at this time. It appears she has not had any recurrent A-fib since 2019.  No recent cardiac workup.  Patient is not on any cardiac medications.    Medications, personal, family, and social history reviewed with patient and  revised.    PAST MEDICAL HISTORY:  Past Medical History:   Diagnosis Date    Ex-smoker     3 PPD for 25 years ago, quit in .    Family history of breast cancer     sister at age 45    Family history of colon cancer     sister in her 60s    Osteopenia     per patient report.       CURRENT MEDICATIONS:  Current Outpatient Medications   Medication Sig Dispense Refill    acetaminophen (TYLENOL) 500 MG tablet Take 500-1,000 mg by mouth every 6 hours as needed for mild pain      calcium carbonate-vitamin D 600-400 MG-UNIT CHEW Take 1 chew tab by mouth 2 times daily For low bone density      carBAMazepine (TEGRETOL XR) 200 MG 12 hr tablet Take 1 tablet (200 mg) by mouth 2 times daily For trigeminal neuralgia      cetirizine (ZYRTEC) 10 MG tablet Take 10 mg by mouth daily as needed for allergies      Cyanocobalamin (B-12 PO) One tablet by mouth daily for b12 deficiency      diclofenac (VOLTAREN) 1 % topical gel Apply 4 g topically 4 times daily 350 g 11    magnesium 250 MG tablet Take 1 tablet by mouth 2 times daily For muscle cramps      meclizine (ANTIVERT) 25 MG tablet Take 1 tablet (25 mg) by mouth 3 times daily as needed for dizziness (Patient not taking: Reported on 10/25/2023) 90 tablet 1    melatonin 5 MG CAPS Take by mouth nightly as needed      propranolol (INDERAL) 10 MG tablet Take 1 tablet (10 mg) by mouth 3 times daily as needed (give if BP > 150/90 at rest.) 270 tablet 1       PAST SURGICAL HISTORY:  Past Surgical History:   Procedure Laterality Date    CHOLECYSTECTOMY      in the 1970s    TUBAL LIGATION         ALLERGIES:     Allergies   Allergen Reactions    Seasonal Allergies Other (See Comments)     Sneezing and congestion        FAMILY HISTORY:  Family History   Problem Relation Age of Onset    Coronary Artery Disease Father         had a pacemaker    Breast Cancer Sister          at age 45    Colon Cancer Sister         Dx in her 60s    Lung Cancer Sister         Dx in her 60s    Diabetes No  family hx of          SOCIAL HISTORY:  Social History     Tobacco Use    Smoking status: Former    Smokeless tobacco: Never   Vaping Use    Vaping Use: Never used   Substance Use Topics    Alcohol use: No     Alcohol/week: 0.0 standard drinks of alcohol    Drug use: No       ROS:   Constitutional: No fever, chills, or sweats. Weight stable.   Cardiovascular: As per HPI.       Exam:  /79 (BP Location: Left arm, Patient Position: Chair, Cuff Size: Adult Large)   Pulse 86   Wt 82.6 kg (182 lb)   SpO2 98%   BMI 30.84 kg/m    GENERAL APPEARANCE: alert and no distress  HEENT: no icterus, no central cyanosis  LYMPH/NECK: no adenopathy, no asymmetry, JVP not elevated  RESPIRATORY: lungs clear to auscultation - no rales, rhonchi or wheezes, no use of accessory muscles, no retractions, respirations are unlabored, normal respiratory rate  CARDIOVASCULAR: regular rhythm, normal S1, S2, no S3 or S4 and no murmur, click or rub, precordium quiet with normal PMI.  GI: soft, non tender  EXTREMITIES: no edema  NEURO: alert, normal speech,and affect  SKIN: no ecchymoses, no rashes     I have reviewed the labs and personally reviewed the imaging below and made my comment in the assessment and plan.    Labs:  CBC RESULTS:   Lab Results   Component Value Date    WBC 10.3 10/25/2023    WBC 9.0 06/09/2020    RBC 3.81 10/25/2023    RBC 3.87 06/09/2020    HGB 12.8 10/25/2023    HGB 12.8 06/09/2020    HCT 38.4 10/25/2023    HCT 38.2 06/09/2020     (H) 10/25/2023    MCV 99 06/09/2020    MCH 33.6 (H) 10/25/2023    MCH 33.1 (H) 06/09/2020    MCHC 33.3 10/25/2023    MCHC 33.5 06/09/2020    RDW 12.3 10/25/2023    RDW 12.0 06/09/2020     10/25/2023     06/09/2020       BMP RESULTS:  Lab Results   Component Value Date     06/09/2020    POTASSIUM 4.4 06/09/2020    CHLORIDE 101 06/09/2020    CO2 26 06/09/2020    ANIONGAP 8 06/09/2020     (H) 07/13/2023     (H) 02/11/2021    BUN 14 06/09/2020    CR  0.87 06/09/2020    GFRESTIMATED 62 06/09/2020    GFRESTBLACK 72 06/09/2020    SACHA 9.5 10/25/2023    SACHA 8.8 06/09/2020      Echocardiogram 2019  Final Conclusion    Technically difficult study - contrast was used to enhance endocardial definition due to   suboptimal image quality.    Normal left ventricular ejection fraction estimated at 55-60%.    Aortic valve sclerosis without significant stenosis.    Normal estimated PA pressures on this study.    Normal IVC size.    Estimated EF: 55-60%     Assessment :   Patient is being seen today with regards to concerns related to blood pressure.  Daughter tells me that she has been dealing with diarrhea lately which was very stressful for her. This has settled now.  Blood pressure is normal in clinic today for her age.  I do not think she needs blood pressure treatment.  I discussed this with the daughter and the patient.  They are agreeable.  Patient tells me that in the past she was offered blood pressure treatment which she declined.    Recommendations:  -Do not recommend blood pressure treatment now.  Blood pressure in clinic is normal (she brought a log.  Latest home measurements are normal as well)  -I think given her history of frequent falls, leg weakness she might be at risk of orthostatic hypotension from blood pressure treatment therefore I think she needs a higher cut off like 150/90 mmHg to initiate blood pressure treatment.    Return to clinic as needed.    Total time spent today for this visit is 40 minutes including precharting, face-to-face clinic visit, review of labs/imaging and medical documentation.    Trino FONTANA MD  [facility] Division of Cardiology  Pager [phone]

## 2024-01-31 NOTE — PATIENT INSTRUCTIONS
Thank you for coming to the [facility] @ [place]; please note the following instructions:    1. Cardiology follow up as needed    2. Reach out if you have any questions.     3. If your blood pressure is consistently running over 150 let us know        If you have any questions regarding your visit please contact your care team:     Cardiology  Telephone Number   Deb JIMÉNEZ., RN  Caitlin DUQUE, RN  Alis CAUSEY, RN  Vaishali SOLORIO, RMA  Rebecca BRADY, JAZZY ARAYA, Visit Facilitator  Annie FUNES [facility] [phone] (option 1)   For scheduling appts:     [phone] (select option 1)       For the Device Clinic (Pacemakers and ICD's)  RN's :  Shanthi Henley   During business hours: [phone]    *After business hours:  [phone] (select option 4)      Normal test result notifications will be released via [employer] or mailed within 7 business days.  All other test results, will be communicated via telephone once reviewed by your cardiologist.    If you need a medication refill please contact your pharmacy.  Please allow 3 business days for your refill to be completed.    As always, thank you for trusting us with your health care needs!

## 2024-01-31 NOTE — NURSING NOTE
"Chief Complaint   Patient presents with    Hypertension     New general cardiology for Benign essential hypertension       Initial /79 (BP Location: Left arm, Patient Position: Chair, Cuff Size: Adult Large)   Pulse 86   Wt 82.6 kg (182 lb)   SpO2 98%   BMI 30.84 kg/m   Estimated body mass index is 30.84 kg/m  as calculated from the following:    Height as of 10/25/23: 1.636 m (5' 4.41\").    Weight as of this encounter: 82.6 kg (182 lb)..  BP completed using cuff size: JAZZY Mcintosh    "

## 2024-02-21 ENCOUNTER — TRANSFERRED RECORDS (OUTPATIENT)
Dept: HEALTH INFORMATION MANAGEMENT | Facility: CLINIC | Age: 84
End: 2024-02-21
Payer: COMMERCIAL

## 2024-03-14 ENCOUNTER — OFFICE VISIT (OUTPATIENT)
Dept: FAMILY MEDICINE | Facility: CLINIC | Age: 84
End: 2024-03-14
Payer: COMMERCIAL

## 2024-03-14 VITALS
SYSTOLIC BLOOD PRESSURE: 116 MMHG | BODY MASS INDEX: 30.42 KG/M2 | WEIGHT: 178.2 LBS | RESPIRATION RATE: 16 BRPM | DIASTOLIC BLOOD PRESSURE: 68 MMHG | OXYGEN SATURATION: 92 % | TEMPERATURE: 97 F | HEART RATE: 106 BPM | HEIGHT: 64 IN

## 2024-03-14 DIAGNOSIS — K57.30 DIVERTICULAR DISEASE OF COLON: ICD-10-CM

## 2024-03-14 DIAGNOSIS — Z09 HOSPITAL DISCHARGE FOLLOW-UP: Primary | ICD-10-CM

## 2024-03-14 DIAGNOSIS — Z86.79 PERSONAL HISTORY OF ATRIAL FIBRILLATION: ICD-10-CM

## 2024-03-14 DIAGNOSIS — M62.81 MUSCLE WEAKNESS (GENERALIZED): ICD-10-CM

## 2024-03-14 DIAGNOSIS — E87.1 LOW SODIUM LEVELS: ICD-10-CM

## 2024-03-14 DIAGNOSIS — M83.9 ADULT OSTEOMALACIA, UNSPECIFIED: ICD-10-CM

## 2024-03-14 DIAGNOSIS — R79.89 TROPONIN LEVEL ELEVATED: ICD-10-CM

## 2024-03-14 DIAGNOSIS — E46 PROTEIN-CALORIE MALNUTRITION, UNSPECIFIED SEVERITY (H): ICD-10-CM

## 2024-03-14 DIAGNOSIS — I48.91 ATRIAL FIBRILLATION, UNSPECIFIED TYPE (H): ICD-10-CM

## 2024-03-14 DIAGNOSIS — R53.83 FATIGUE, UNSPECIFIED TYPE: ICD-10-CM

## 2024-03-14 LAB
ALT SERPL W P-5'-P-CCNC: 14 U/L (ref 0–50)
ANION GAP SERPL CALCULATED.3IONS-SCNC: 12 MMOL/L (ref 7–15)
AST SERPL W P-5'-P-CCNC: 21 U/L (ref 0–45)
BUN SERPL-MCNC: 13.7 MG/DL (ref 8–23)
CALCIUM SERPL-MCNC: 9.2 MG/DL (ref 8.8–10.2)
CHLORIDE SERPL-SCNC: 95 MMOL/L (ref 98–107)
CK SERPL-CCNC: 49 U/L (ref 26–192)
CREAT SERPL-MCNC: 0.98 MG/DL (ref 0.51–0.95)
DEPRECATED HCO3 PLAS-SCNC: 23 MMOL/L (ref 22–29)
EGFRCR SERPLBLD CKD-EPI 2021: 57 ML/MIN/1.73M2
GLUCOSE SERPL-MCNC: 90 MG/DL (ref 70–99)
IRON BINDING CAPACITY (ROCHE): 211 UG/DL (ref 240–430)
IRON SATN MFR SERPL: 37 % (ref 15–46)
IRON SERPL-MCNC: 79 UG/DL (ref 37–145)
LDH SERPL L TO P-CCNC: 198 U/L (ref 0–250)
POTASSIUM SERPL-SCNC: 4.4 MMOL/L (ref 3.4–5.3)
SODIUM SERPL-SCNC: 130 MMOL/L (ref 135–145)
TROPONIN T SERPL HS-MCNC: 16 NG/L
TSH SERPL DL<=0.005 MIU/L-ACNC: 2.59 UIU/ML (ref 0.3–4.2)
VIT D+METAB SERPL-MCNC: 28 NG/ML (ref 20–50)

## 2024-03-14 PROCEDURE — 80048 BASIC METABOLIC PNL TOTAL CA: CPT | Performed by: NURSE PRACTITIONER

## 2024-03-14 PROCEDURE — 99214 OFFICE O/P EST MOD 30 MIN: CPT | Performed by: NURSE PRACTITIONER

## 2024-03-14 PROCEDURE — 84443 ASSAY THYROID STIM HORMONE: CPT | Performed by: NURSE PRACTITIONER

## 2024-03-14 PROCEDURE — 84460 ALANINE AMINO (ALT) (SGPT): CPT | Performed by: NURSE PRACTITIONER

## 2024-03-14 PROCEDURE — 83970 ASSAY OF PARATHORMONE: CPT | Performed by: NURSE PRACTITIONER

## 2024-03-14 PROCEDURE — 82550 ASSAY OF CK (CPK): CPT | Performed by: NURSE PRACTITIONER

## 2024-03-14 PROCEDURE — 84450 TRANSFERASE (AST) (SGOT): CPT | Performed by: NURSE PRACTITIONER

## 2024-03-14 PROCEDURE — 83550 IRON BINDING TEST: CPT | Mod: GZ | Performed by: NURSE PRACTITIONER

## 2024-03-14 PROCEDURE — 83615 LACTATE (LD) (LDH) ENZYME: CPT | Performed by: NURSE PRACTITIONER

## 2024-03-14 PROCEDURE — 82306 VITAMIN D 25 HYDROXY: CPT | Mod: GZ | Performed by: NURSE PRACTITIONER

## 2024-03-14 PROCEDURE — 86376 MICROSOMAL ANTIBODY EACH: CPT | Performed by: NURSE PRACTITIONER

## 2024-03-14 PROCEDURE — 36415 COLL VENOUS BLD VENIPUNCTURE: CPT | Performed by: NURSE PRACTITIONER

## 2024-03-14 PROCEDURE — 84484 ASSAY OF TROPONIN QUANT: CPT | Performed by: NURSE PRACTITIONER

## 2024-03-14 PROCEDURE — 83540 ASSAY OF IRON: CPT | Mod: GZ | Performed by: NURSE PRACTITIONER

## 2024-03-14 NOTE — PROGRESS NOTES
Assessment & Plan     Hospital discharge follow-up      Fatigue, unspecified type    - TSH with free T4 reflex; Future  - Thyroid peroxidase antibody; Future  - CK total; Future  - ALT; Future  - AST; Future  - Lactate Dehydrogenase; Future  - Parathyroid Hormone Intact; Future  - Vitamin D Deficiency; Future  - Iron and iron binding capacity; Future  - TSH with free T4 reflex  - Thyroid peroxidase antibody  - CK total  - ALT  - AST  - Lactate Dehydrogenase  - Parathyroid Hormone Intact  - Vitamin D Deficiency  - Iron and iron binding capacity    Muscle weakness (generalized)    - TSH with free T4 reflex; Future  - Thyroid peroxidase antibody; Future  - CK total; Future  - ALT; Future  - AST; Future  - Lactate Dehydrogenase; Future  - Parathyroid Hormone Intact; Future  - Vitamin D Deficiency; Future  - Iron and iron binding capacity; Future  - TSH with free T4 reflex  - Thyroid peroxidase antibody  - CK total  - ALT  - AST  - Lactate Dehydrogenase  - Parathyroid Hormone Intact  - Vitamin D Deficiency  - Iron and iron binding capacity    Low sodium levels    - Basic metabolic panel  (Ca, Cl, CO2, Creat, Gluc, K, Na, BUN); Future  - Basic metabolic panel  (Ca, Cl, CO2, Creat, Gluc, K, Na, BUN)    Troponin level elevated    - Troponin T, High Sensitivity; Future  - Troponin T, High Sensitivity    Personal history of atrial fibrillation    - Troponin T, High Sensitivity; Future  - Troponin T, High Sensitivity    Adult osteomalacia, unspecified    - Vitamin D Deficiency; Future  - Iron and iron binding capacity; Future  - Vitamin D Deficiency  - Iron and iron binding capacity    Diverticular disease of colon    - Vitamin D Deficiency; Future  - Iron and iron binding capacity; Future  - Vitamin D Deficiency  - Iron and iron binding capacity    Protein-calorie malnutrition, unspecified severity (H24)    - Iron and iron binding capacity; Future  - Iron and iron binding capacity    Atrial fibrillation, unspecified type  "(H)      Review of external notes as documented elsewhere in note  Ordering of each unique test  Prescription drug management  30 minutes spent by me on the date of the encounter doing chart review, history and exam, documentation and further activities per the note    MED REC REQUIRED  Post Medication Reconciliation Status:  Discharge medications reconciled, continue medications without change  BMI  Estimated body mass index is 30.59 kg/m  as calculated from the following:    Height as of this encounter: 1.626 m (5' 4\").    Weight as of this encounter: 80.8 kg (178 lb 3.2 oz).   Discussed need to do regular exercise to maintain mobility.  Discussed physical therapy, patient reports she does not want to do physical therapy.  Discussed doing home exercises to maintain mobility and muscle strength.  Not doing exercises can also contribute to muscle weakness, deconditioning and fatigue.      Regular exercise  See Patient Instructions    Subjective   Bee is a 83 year old, presenting for the following health issues:  Hospital F/U        3/14/2024     8:37 AM   Additional Questions   Roomed by Popeye     [facility]         Hospital Follow-up Visit:    Hospital/Nursing Home/ Rehab Facility:  [facility]  Date of Admission: 2/28/24  Date of Discharge: 2/28/24  Reason(s) for Admission: Vomiting, diarrhea, passed out     Was your hospitalization related to COVID-19? No   Problems taking medications regularly:  None  Medication changes since discharge: None  Problems adhering to non-medication therapy:  None    Summary of hospitalization:  CareEverywhere information obtained and reviewed  Diagnostic Tests/Treatments reviewed.  Follow up needed: At hospital had low sodium and elevated troponin we will recheck labs.  Additionally we will check for causes of muscle weakness and fatigue.  Discussed following diverticular diet.  History of A-fib she denies chest pain with syncope.  Stomach flu was going around " "assisted living.  She did have vomiting in the ambulance.  Discussed vasovagal syncope.  Discussed history of A-fib she reports this occurred once a long time ago.  She saw cardiology wants after that episode.  Will recheck labs if troponin still elevated will refer to cardiology.  Discussed need to remain hydrated especially when GI illness.  She questions about starting Vyvgart that she has seen on TV for muscle weakness.  Discussed checking labs first.  Discussed need to do regular exercises to maintain muscle strength and mobility.  She does not want to do physical therapy.  Other Healthcare Providers Involved in Patient s Care:          Lives at an assisted living facility  Update since discharge: improved.         Plan of care communicated with patient and family               Review of Systems  Constitutional, HEENT, cardiovascular, pulmonary, GI, , musculoskeletal, neuro, skin, endocrine and psych systems are negative, except as otherwise noted.      Objective    /68   Pulse 106   Temp 97  F (36.1  C)   Resp 16   Ht 1.626 m (5' 4\")   Wt 80.8 kg (178 lb 3.2 oz)   LMP  (LMP Unknown)   SpO2 92%   Breastfeeding No   BMI 30.59 kg/m    Body mass index is 30.59 kg/m .  Physical Exam   GENERAL: alert and no distress  EYES: Eyes grossly normal to inspection  RESP: lungs clear to auscultation - no rales, rhonchi or wheezes  CV: regular rate and rhythm, normal S1 S2, no S3 or S4, no murmur, click or rub, no peripheral edema  ABDOMEN: soft, nontender, no hepatosplenomegaly, no masses and bowel sounds normal  MS: no gross musculoskeletal defects noted, no edema, no CVA tenderness  PSYCH: mentation appears normal, affect normal/bright    See orders        Signed Electronically by: JOSE E GODINEZ    "

## 2024-03-15 LAB
PTH-INTACT SERPL-MCNC: 35 PG/ML (ref 15–65)
THYROPEROXIDASE AB SERPL-ACNC: 46 IU/ML

## 2024-03-20 ENCOUNTER — TELEPHONE (OUTPATIENT)
Dept: FAMILY MEDICINE | Facility: CLINIC | Age: 84
End: 2024-03-20
Payer: COMMERCIAL

## 2024-03-20 DIAGNOSIS — R79.89 ABNORMAL THYROID BLOOD TEST: ICD-10-CM

## 2024-03-20 DIAGNOSIS — M62.81 MUSCLE WEAKNESS (GENERALIZED): Primary | ICD-10-CM

## 2024-03-20 NOTE — TELEPHONE ENCOUNTER
----- Message from Marisol Sandhu NP sent at 3/20/2024 10:08 AM CDT -----  Team - please call patient per her request.    Thyroid peroxidase level is elevated, but not very high.  This can indicate a thyroid condition that could possibly be causing her fatigue.  If she would like to discuss with endocrinology, we can place referral.    Troponin level is still elevated but same as when at the ER.  This can indicate a cardiac cause for her fatigue.  She should schedule with cardiology.  Does she have one currently?    Sodium level is slightly low as well as chloride.  She can add a slight amount of salt to her normal food to increase this.  Low sodium can cause heart palpitations.    Kidney function has improved from previous.  Other labs appear normal.  I do not know if it would be a good idea for her to start vitgart-a medication she requested for muscle weakness without being evaluated for the above conditions.

## 2024-03-20 NOTE — TELEPHONE ENCOUNTER
Patient notified of Provider's message as written.  Patient verbalized understanding.    Patient sees [facility] cardiology  Advised that she follow-up with her cardiologist.    Patient requesting referral to endocrinology    Teena Webster RN  [facility]

## 2024-03-20 NOTE — TELEPHONE ENCOUNTER
Attempted to call patient with number on file to relay provider's message below with no answer, left voicemail to call clinic back at [phone].    Dena Barber, RN on 3/20/2024 at 10:44 AM

## 2024-03-21 NOTE — TELEPHONE ENCOUNTER
Patient called the clinic asking why she has the referral to Endocrinology. She has an appointment scheduled on 11/4/24. She does not want to keep this appointment unless it is helpful and she is confused about what they will accomplish.     I mentioned Cardiology with patient and she didn't think she needed to see them either.     Patient requests a call from Marisol to discuss her recent labs and referrals.     Virtual visit scheduled on 3/27/24.     Shanthi BARLOW  [facility]

## 2024-03-27 ENCOUNTER — VIRTUAL VISIT (OUTPATIENT)
Dept: FAMILY MEDICINE | Facility: CLINIC | Age: 84
End: 2024-03-27
Payer: COMMERCIAL

## 2024-03-27 DIAGNOSIS — R94.6 ABNORMAL RESULTS OF THYROID FUNCTION STUDIES: ICD-10-CM

## 2024-03-27 DIAGNOSIS — M62.81 MUSCLE WEAKNESS (GENERALIZED): ICD-10-CM

## 2024-03-27 DIAGNOSIS — Z09 FOLLOW-UP EXAM: Primary | ICD-10-CM

## 2024-03-27 DIAGNOSIS — R89.9 ABNORMAL LABORATORY TEST: ICD-10-CM

## 2024-03-27 PROCEDURE — 99443 PR PHYSICIAN TELEPHONE EVALUATION 21-30 MIN: CPT | Mod: 93 | Performed by: NURSE PRACTITIONER

## 2024-03-27 NOTE — PROGRESS NOTES
"Bee is a 83 year old who is being evaluated via a billable telephone visit.    What phone number would you like to be contacted at? [phone]  How would you like to obtain your AVS? Willam  Originating Location (pt. Location): Home    Distant Location (provider location):  Off-site    Assessment & Plan     Follow-up exam      Muscle weakness (generalized)      Abnormal laboratory test      Review of external notes as documented elsewhere in note  Ordering of each unique test  Prescription drug management  25 minutes spent by me on the date of the encounter doing chart review, history and exam, documentation and further activities per the note      BMI  Estimated body mass index is 30.59 kg/m  as calculated from the following:    Height as of 3/14/24: 1.626 m (5' 4\").    Weight as of 3/14/24: 80.8 kg (178 lb 3.2 oz).         See Patient Instructions    Subjective   Bee is a 83 year old, presenting for the following health issues:  Lab Result Notice and questions regarding thyroid        3/27/2024    10:30 AM   Additional Questions   Roomed by Ebonie   Accompanied by piedad         3/27/2024    10:30 AM   Patient Reported Additional Medications   Patient reports taking the following new medications none     HPI           Review of Systems  Constitutional, HEENT, cardiovascular, pulmonary, GI, , musculoskeletal, neuro, skin, endocrine and psych systems are negative, except as otherwise noted.      Objective         Abnormal labs- thyroid peroxidase 46 (normal range < 35)  Iron binding 211 (range 240-430)  Troponin 16- same as when in ER- advised to let her cardiologist know- saw them a few months ago. Does not feel issues are addressed well at speciality apppointments. Discussed they may recheck, change a medication or do nothing. Follow up if having chest pain or other symptoms.   Chloride 95 ()  Sodium 130 (135-145)  Vivart- for myasthenia gravis  Should refer to neurology for further eval muscle " weakness. She has neurologist, will discuss with them at next visit  Thyroid can cause fatigue- not that far out of range  GFR slightly low.       Started taking supplement- helping a lot- lijulian childress mushroom- has gotten popular- helping with better walking, weakness. Discussed risks/benefits of supplements    Vitals:  No vitals were obtained today due to virtual visit.    Physical Exam   General: Alert and no distress //Respiratory: No audible wheeze, cough, or shortness of breath // Psychiatric:  Appropriate affect, tone, and pace of words          Phone call duration: 10 minutes  Signed Electronically by: JOSE E GODINEZ

## 2024-03-29 ENCOUNTER — TELEPHONE (OUTPATIENT)
Dept: FAMILY MEDICINE | Facility: CLINIC | Age: 84
End: 2024-03-29
Payer: COMMERCIAL

## 2024-03-29 DIAGNOSIS — M62.81 MUSCLE WEAKNESS (GENERALIZED): ICD-10-CM

## 2024-03-29 DIAGNOSIS — R29.6 RECURRENT FALLS: Primary | ICD-10-CM

## 2024-03-29 NOTE — TELEPHONE ENCOUNTER
Update: no injuries noted. Will updated the provider if anything changes    Italo RN called from [facility]    Update: Pt had another fall today.     Marisol NP,     Italo RN from [facility] called to report and ask for orders.     Update: fall yesterday in apartment. Got up from toilet lost balance fell to floor. Did not hit head, no injury noted. VS: /76, P 76, RR 22, Temp 97.3F, 94% RA, no pain at the time.     Request: one week ago had an off site OT find patient in hallway with balance loss and recommended OT/PT. Asking if she can please have outpatient OT/PT order. They will use the company that comes out to their [facility].    Please Fax orders to [phone] Attention to: Italo SALCEDO    Thanks,  DENISSE Esqueda  [facility]

## 2024-03-29 NOTE — TELEPHONE ENCOUNTER
RN sees [facility] wanted these orders faxed to them.       Asking if she can please have outpatient OT/PT order. They will use the company that comes out to their [facility].     Please Fax orders to [phone] Attention to: Italo Pacheco RN on 3/29/2024 at 1:11 PM

## 2024-04-04 ENCOUNTER — MEDICAL CORRESPONDENCE (OUTPATIENT)
Dept: HEALTH INFORMATION MANAGEMENT | Facility: CLINIC | Age: 84
End: 2024-04-04

## 2024-04-04 ENCOUNTER — TRANSFERRED RECORDS (OUTPATIENT)
Dept: HEALTH INFORMATION MANAGEMENT | Facility: CLINIC | Age: 84
End: 2024-04-04
Payer: COMMERCIAL

## 2024-04-10 ENCOUNTER — TRANSFERRED RECORDS (OUTPATIENT)
Dept: HEALTH INFORMATION MANAGEMENT | Facility: CLINIC | Age: 84
End: 2024-04-10
Payer: COMMERCIAL

## 2024-04-24 ENCOUNTER — TELEPHONE (OUTPATIENT)
Dept: FAMILY MEDICINE | Facility: CLINIC | Age: 84
End: 2024-04-24
Payer: COMMERCIAL

## 2024-04-24 NOTE — TELEPHONE ENCOUNTER
[facility]   Phone number listed: [phone]   Fax listed: [phone]  Date received: 4/19/24  Form description: Outpatient clinic OT eval & plan of treatment  Once forms are completed, please [facility].  Is patient requesting to be contacted when forms are completed: na  Phone: na  Form placed:  Marisol Young

## 2024-05-14 ENCOUNTER — TELEPHONE (OUTPATIENT)
Dept: FAMILY MEDICINE | Facility: CLINIC | Age: 84
End: 2024-05-14
Payer: COMMERCIAL

## 2024-05-14 NOTE — TELEPHONE ENCOUNTER
"Received call from Aston, with [facility].     Patient has had two unwitnessed falls in her apartment, one on 5/8 and one 5/12. She did not sustain any injuries but does note her legs will randomly \"give out\". Patient is enrolled in PT but has been declining eval. They are going to re-attempt.     Aston is also wondering if she can get a verbal order for referral to their in-house psychologist. Patient has been reporting feeling more sad and down lately, with appetite loss and feeling more tired than normal.     BLAKE ValderramaN RN  [facility] [place]  Primary Care  "

## 2024-05-14 NOTE — TELEPHONE ENCOUNTER
Allan with referral to their in-house psychiatrist /psychologist.  She should be working with physical therapy. RAQUEL Mcneil, FNP-BC

## 2024-05-14 NOTE — TELEPHONE ENCOUNTER
Spoke with Aston and relayed pcp's message below. Aston stated that pt refused OT due to copay and they have plan on having a care conference soon discuss PT eval. Assisted living to call clinic for update or concerns.     Dena Barber, RN on 5/14/2024 at 2:44 PM

## 2024-05-21 ENCOUNTER — MEDICAL CORRESPONDENCE (OUTPATIENT)
Dept: HEALTH INFORMATION MANAGEMENT | Facility: CLINIC | Age: 84
End: 2024-05-21
Payer: COMMERCIAL

## 2024-05-21 ENCOUNTER — TELEPHONE (OUTPATIENT)
Dept: FAMILY MEDICINE | Facility: CLINIC | Age: 84
End: 2024-05-21
Payer: COMMERCIAL

## 2024-05-21 NOTE — TELEPHONE ENCOUNTER
Forms received from:  [facility]   Phone number listed: [phone]   Fax listed: [phone]  Date received: 5/17/24  Form description: Referral in house  Once forms are completed, please return to [facility] via fax.  Is patient requesting to be contacted when forms are completed: na  Phone: na  Form placed:  Marisol Young

## 2024-05-22 ENCOUNTER — TELEPHONE (OUTPATIENT)
Dept: FAMILY MEDICINE | Facility: CLINIC | Age: 84
End: 2024-05-22
Payer: COMMERCIAL

## 2024-05-22 NOTE — TELEPHONE ENCOUNTER
Tyrese called from [facility].     Patient had previously declined her physical therapy. However, she has agreed to go ahead with PT now (for Generalized weakness & recent falls).     They need an updated order before they can start Physical Therapy at this point.     Please review and advise.     Shanthi Abdi RN BSN  [facility]

## 2024-05-23 ENCOUNTER — MEDICAL CORRESPONDENCE (OUTPATIENT)
Dept: HEALTH INFORMATION MANAGEMENT | Facility: CLINIC | Age: 84
End: 2024-05-23
Payer: COMMERCIAL

## 2024-05-23 NOTE — TELEPHONE ENCOUNTER
Called Tyrese.  Did they answer the phone: No, left a message on voicemail to return call to the [facility] at [phone], and to ask for any available triage nurse.  (RN did not leave specific details on voicemail for confidential reasons)    Elin LOZANON RN  Triage Nurse  [facility]

## 2024-05-23 NOTE — TELEPHONE ENCOUNTER
Spoke with Tyrese. Relayed provider's message as written. Tyrese verbalized understanding and has no further questions at this time.    Tyrese requesting approval of orders note to be faxed to their facility. Routing to team to please assist with faxing.    Fax #: [phone]    BLAKE OmalleyN DENISSE  [facility]

## 2024-05-23 NOTE — TELEPHONE ENCOUNTER
Called Tyrese- she stated they did a verbal order and nothing needs to be faxed at this time.      Dena Fatima   Lead    [facility]

## 2024-05-29 ENCOUNTER — TELEPHONE (OUTPATIENT)
Dept: FAMILY MEDICINE | Facility: CLINIC | Age: 84
End: 2024-05-29
Payer: COMMERCIAL

## 2024-05-29 NOTE — TELEPHONE ENCOUNTER
Forms received from:  [facility]   Phone number listed: [phone]   Fax listed: [phone]  Date received: 5/28/24  Form description: PT to eval & treat  Once forms are completed, please return to [facility] via fax.  Is patient requesting to be contacted when forms are completed: na  Phone: na  Form placed:  Marisol Young

## 2024-05-30 ENCOUNTER — MEDICAL CORRESPONDENCE (OUTPATIENT)
Dept: HEALTH INFORMATION MANAGEMENT | Facility: CLINIC | Age: 84
End: 2024-05-30
Payer: COMMERCIAL

## 2024-05-31 ENCOUNTER — TELEPHONE (OUTPATIENT)
Dept: FAMILY MEDICINE | Facility: CLINIC | Age: 84
End: 2024-05-31
Payer: COMMERCIAL

## 2024-05-31 NOTE — TELEPHONE ENCOUNTER
Forms received from:  [facility]   Phone number listed: [phone]   Fax listed: [phone]  Date received: 5/28/24  Form description: Outpatient clinic OT eval & plan of treatment  Once forms are completed, please return to [facility] via fax.  Is patient requesting to be contacted when forms are completed: na  Phone: na  Form placed:  Marisol Young

## 2024-06-13 ENCOUNTER — PATIENT OUTREACH (OUTPATIENT)
Dept: CARE COORDINATION | Facility: CLINIC | Age: 84
End: 2024-06-13
Payer: COMMERCIAL

## 2024-06-20 ENCOUNTER — LAB REQUISITION (OUTPATIENT)
Dept: LAB | Facility: CLINIC | Age: 84
End: 2024-06-20
Payer: COMMERCIAL

## 2024-06-20 DIAGNOSIS — I10 ESSENTIAL (PRIMARY) HYPERTENSION: ICD-10-CM

## 2024-06-20 DIAGNOSIS — D64.9 ANEMIA, UNSPECIFIED: ICD-10-CM

## 2024-06-20 DIAGNOSIS — E83.42 HYPOMAGNESEMIA: ICD-10-CM

## 2024-06-24 LAB
ANION GAP SERPL CALCULATED.3IONS-SCNC: 10 MMOL/L (ref 7–15)
BUN SERPL-MCNC: 10.2 MG/DL (ref 8–23)
CALCIUM SERPL-MCNC: 9.1 MG/DL (ref 8.8–10.2)
CHLORIDE SERPL-SCNC: 98 MMOL/L (ref 98–107)
CREAT SERPL-MCNC: 0.98 MG/DL (ref 0.51–0.95)
DEPRECATED HCO3 PLAS-SCNC: 24 MMOL/L (ref 22–29)
EGFRCR SERPLBLD CKD-EPI 2021: 57 ML/MIN/1.73M2
ERYTHROCYTE [DISTWIDTH] IN BLOOD BY AUTOMATED COUNT: 13.2 % (ref 10–15)
GLUCOSE SERPL-MCNC: 121 MG/DL (ref 70–99)
HCT VFR BLD AUTO: 37.5 % (ref 35–47)
HGB BLD-MCNC: 12.7 G/DL (ref 11.7–15.7)
MAGNESIUM SERPL-MCNC: 2 MG/DL (ref 1.7–2.3)
MCH RBC QN AUTO: 33.7 PG (ref 26.5–33)
MCHC RBC AUTO-ENTMCNC: 33.9 G/DL (ref 31.5–36.5)
MCV RBC AUTO: 100 FL (ref 78–100)
PLATELET # BLD AUTO: 276 10E3/UL (ref 150–450)
POTASSIUM SERPL-SCNC: 4 MMOL/L (ref 3.4–5.3)
RBC # BLD AUTO: 3.77 10E6/UL (ref 3.8–5.2)
SODIUM SERPL-SCNC: 132 MMOL/L (ref 135–145)
WBC # BLD AUTO: 9.1 10E3/UL (ref 4–11)

## 2024-06-24 PROCEDURE — P9603 ONE-WAY ALLOW PRORATED MILES: HCPCS | Mod: ORL | Performed by: FAMILY MEDICINE

## 2024-06-24 PROCEDURE — 80048 BASIC METABOLIC PNL TOTAL CA: CPT | Mod: ORL | Performed by: FAMILY MEDICINE

## 2024-06-24 PROCEDURE — 85027 COMPLETE CBC AUTOMATED: CPT | Mod: ORL | Performed by: FAMILY MEDICINE

## 2024-06-24 PROCEDURE — 36415 COLL VENOUS BLD VENIPUNCTURE: CPT | Mod: ORL | Performed by: FAMILY MEDICINE

## 2024-06-24 PROCEDURE — 83735 ASSAY OF MAGNESIUM: CPT | Mod: ORL | Performed by: FAMILY MEDICINE

## 2024-06-27 ENCOUNTER — PATIENT OUTREACH (OUTPATIENT)
Dept: CARE COORDINATION | Facility: CLINIC | Age: 84
End: 2024-06-27
Payer: COMMERCIAL

## 2024-06-27 ENCOUNTER — MEDICAL CORRESPONDENCE (OUTPATIENT)
Dept: HEALTH INFORMATION MANAGEMENT | Facility: CLINIC | Age: 84
End: 2024-06-27
Payer: COMMERCIAL

## 2024-06-27 ENCOUNTER — TELEPHONE (OUTPATIENT)
Dept: FAMILY MEDICINE | Facility: CLINIC | Age: 84
End: 2024-06-27
Payer: COMMERCIAL

## 2024-06-27 NOTE — TELEPHONE ENCOUNTER
Forms received from: [facility]    Phone number listed: [phone]   Fax listed: [phone]  Date received: 6/26/24  Form description: OT & PT outpatient clinic zachary & plan of treatment  Once forms are completed, please return to [facility] via fax.  Is patient requesting to be contacted when forms are completed: na  Phone: na  Form placed:  Marisol Young

## 2024-06-28 ENCOUNTER — TELEPHONE (OUTPATIENT)
Dept: FAMILY MEDICINE | Facility: CLINIC | Age: 84
End: 2024-06-28
Payer: COMMERCIAL

## 2024-06-28 NOTE — TELEPHONE ENCOUNTER
Forms received from: [facility]    Phone number listed: [phone]   Fax listed: [phone]  Date received: 6/27/24  Form description:  PT outpatient clinic PT recert/Updated plan /IN  Once forms are completed, please return to [facility] via fax.  Is patient requesting to be contacted when forms are completed: na  Phone: na  Form placed:  Marisol Young

## 2024-07-22 ENCOUNTER — TELEPHONE (OUTPATIENT)
Dept: CARDIOLOGY | Facility: CLINIC | Age: 84
End: 2024-07-22
Payer: COMMERCIAL

## 2024-07-22 NOTE — TELEPHONE ENCOUNTER
[facility] Call [facility]    Phone Message    May a detailed message be left on voicemail: yes     Reason for Call: Other: This is an FYI message for Roxi to inform her that Bee has an appt to see her tomorrow at 12:45. No further action is needed. Thank you!     Action Taken: Other: Cardiology    Travel Screening: Not Applicable      Thank you!  [facility]       Date of Service:

## 2024-07-23 ENCOUNTER — OFFICE VISIT (OUTPATIENT)
Dept: CARDIOLOGY | Facility: CLINIC | Age: 84
End: 2024-07-23
Payer: COMMERCIAL

## 2024-07-23 VITALS
WEIGHT: 164 LBS | SYSTOLIC BLOOD PRESSURE: 155 MMHG | OXYGEN SATURATION: 94 % | HEART RATE: 84 BPM | DIASTOLIC BLOOD PRESSURE: 83 MMHG | BODY MASS INDEX: 28.15 KG/M2

## 2024-07-23 DIAGNOSIS — E87.1 HYPONATREMIA: ICD-10-CM

## 2024-07-23 DIAGNOSIS — R55 VASOVAGAL SYNCOPE: Primary | ICD-10-CM

## 2024-07-23 PROCEDURE — 99215 OFFICE O/P EST HI 40 MIN: CPT

## 2024-07-23 NOTE — NURSING NOTE
"Chief Complaint   Patient presents with    Syncope     Hx of parox afib  Recent syncope episode. ED advised to see cardiology since she had a previous syncopal episode in the last       [facility] Hospitalization       Initial BP (!) 155/83 (BP Location: Right arm, Patient Position: Chair, Cuff Size: Adult Regular)   Pulse 84   Wt 74.4 kg (164 lb)   LMP  (LMP Unknown)   SpO2 94%   BMI 28.15 kg/m   Estimated body mass index is 28.15 kg/m  as calculated from the following:    Height as of 3/14/24: 1.626 m (5' 4\").    Weight as of this encounter: 74.4 kg (164 lb)..  BP completed using cuff size: regular    JAZZY Agudelo    "

## 2024-07-23 NOTE — LETTER
7/23/2024      RE: Bee Yepez  [address]  [place] [address]       Dear Colleague,    Thank you for the opportunity to participate in the care of your patient, Bee Yepez, at the [facility]ART [facility]. Please see a copy of my visit note below.             General Cardiology Clinic - [place]      HPI: Ms. Bee Yepez is a 83 year old female presenting to cardiology clinic today for syncope. PMH significant for paroxysmal AF in setting of influenza (2019), cognitive decline, dizziness, and falls.    Prior clinic visit on 1/31/24 with Dr. Patton for HTN. She also endorsed frequent dizziness. Blood pressure goals were increased to <150/90, and medication treatment was not recommended, as patient has had normal pressures for several months prior to visit.    Recent ER visit for Syncope at [facility] on 7/20. Patient reports that she was with her assisted living facility staff in the bathroom when she had a syncopal episode on the toilet while having a bowel movement. Endorses she has been having diarrhea recently. Also endorses vomiting after her syncopal episode. Notes she was unconsciousness for about 10 seconds per staff member and remembers hearing her staff talk to her but not being able to respond.     Today in clinic, she denies any current chest pain, palpitations, dizziness, lower extremity edema, shortness of breath, BENDER, or PND. She goes to exercise classes 2-3 times a week at her assisted living facility but otherwise she is very sedentary. Patient is able to tolerate classes well and doesn't feel like they impact her symptoms.    Patient's son is present with her in clinic today, and endorses that patient does not hydrate well, and he feels like could be contributing to past symptoms as well.      Current Cardiac Medications:  none      PAST MEDICAL HISTORY:  Past Medical History:   Diagnosis Date     Ex-smoker      3 PPD for 25 years ago, quit in .     Family history of breast cancer     sister at age 45     Family history of colon cancer     sister in her 60s     Osteopenia     per patient report.       CURRENT MEDICATIONS:  Current Outpatient Medications   Medication Sig Dispense Refill     acetaminophen (TYLENOL) 500 MG tablet Take 500-1,000 mg by mouth every 6 hours as needed for mild pain       calcium carbonate-vitamin D 600-400 MG-UNIT CHEW Take 1 chew tab by mouth 2 times daily For low bone density       carBAMazepine (TEGRETOL XR) 200 MG 12 hr tablet Take 1 tablet (200 mg) by mouth 2 times daily For trigeminal neuralgia       Cyanocobalamin (B-12 PO) One tablet by mouth daily for b12 deficiency       magnesium 250 MG tablet Take 1 tablet by mouth 2 times daily For muscle cramps       melatonin 5 MG CAPS Take by mouth nightly as needed       cetirizine (ZYRTEC) 10 MG tablet Take 10 mg by mouth daily as needed for allergies (Patient not taking: Reported on 3/27/2024)       diclofenac (VOLTAREN) 1 % topical gel Apply 4 g topically 4 times daily (Patient not taking: Reported on 3/27/2024) 350 g 11       PAST SURGICAL HISTORY:  Past Surgical History:   Procedure Laterality Date     CHOLECYSTECTOMY      in the 1970s     TUBAL LIGATION         ALLERGIES:  Allergies   Allergen Reactions     Seasonal Allergies Other (See Comments)     Sneezing and congestion        FAMILY HISTORY:  Family History   Problem Relation Age of Onset     Coronary Artery Disease Father         had a pacemaker     Breast Cancer Sister          at age 45     Colon Cancer Sister         Dx in her 60s     Lung Cancer Sister         Dx in her 60s     Diabetes No family hx of        SOCIAL HISTORY:  Social History     Tobacco Use     Smoking status: Former     Smokeless tobacco: Never   Vaping Use     Vaping status: Never Used   Substance Use Topics     Alcohol use: No     Alcohol/week: 0.0 standard drinks of alcohol     Drug use: No       ROS:  "  Constitutional: No fever, chills, or sweats.  Cardiovascular: As per HPI.       Exam:  BP (!) 155/83 (BP Location: Right arm, Patient Position: Chair, Cuff Size: Adult Regular)   Pulse 84   Wt 74.4 kg (164 lb)   LMP  (LMP Unknown)   SpO2 94%   BMI 28.15 kg/m      GENERAL: alert and no distress  HEENT: no icterus, no central cyanosis  LYMPH/NECK: no adenopathy, no asymmetry  RESPIRATORY: lungs clear to auscultation - no rales, rhonchi or wheezes, no use of accessory muscles, respirations are unlabored, normal respiratory rate  CARDIOVASCULAR: RRR, +S1S2, no murmur, rub, or gallop.   GI: soft, non tender  EXTREMITIES: peripheral pulses normal, no peripheral edema  NEURO: alert, normal speech and affect  SKIN: no jaundice, no rashes or lesions      Labs:  Lab Results   Component Value Date    WBC 9.1 06/24/2024    WBC 9.0 06/09/2020    RBC 3.77 (L) 06/24/2024    RBC 3.87 06/09/2020    HGB 12.7 06/24/2024    HGB 12.8 06/09/2020    HCT 37.5 06/24/2024    HCT 38.2 06/09/2020     06/24/2024    MCV 99 06/09/2020    MCH 33.7 (H) 06/24/2024    MCH 33.1 (H) 06/09/2020    MCHC 33.9 06/24/2024    MCHC 33.5 06/09/2020    RDW 13.2 06/24/2024    RDW 12.0 06/09/2020     06/24/2024     06/09/2020       Lab Results   Component Value Date     (L) 06/24/2024     06/09/2020    POTASSIUM 4.0 06/24/2024    POTASSIUM 4.4 06/09/2020    CHLORIDE 98 06/24/2024    CHLORIDE 101 06/09/2020    CO2 24 06/24/2024    CO2 26 06/09/2020    ANIONGAP 10 06/24/2024    ANIONGAP 8 06/09/2020     (H) 06/24/2024     (H) 02/11/2021    BUN 10.2 06/24/2024    BUN 14 06/09/2020    CR 0.98 (H) 06/24/2024    CR 0.87 06/09/2020    GFRESTIMATED 57 (L) 06/24/2024    GFRESTIMATED 62 06/09/2020    GFRESTBLACK 72 06/09/2020    SACHA 9.1 06/24/2024    SACHA 8.8 06/09/2020        No results found for: \"INR\"    No results found for: \"NTBNP\"    Lab Results   Component Value Date    CHOL 228 07/13/2023    CHOL 237 02/11/2021 " "    Lab Results   Component Value Date    HDL 73 07/13/2023    HDL 58 02/11/2021     Lab Results   Component Value Date     07/13/2023     02/11/2021     Lab Results   Component Value Date    TRIG 159 07/13/2023    TRIG 170 02/11/2021     No results found for: \"CHOLHDLRATIO\"    Diagnostics    Echocardiogram 2019  Technically difficult study - contrast was used to enhance endocardial definition due to   suboptimal image quality.    Normal left ventricular ejection fraction estimated at 55-60%.    Aortic valve sclerosis without significant stenosis.    Normal estimated PA pressures on this study.    Normal IVC size.    Estimated EF: 55-60%       Assessment and Plan:   Ms. Yepez is a 83 year old female with a PMH of paroxysmal AF in setting of influenza (2019), cognitive decline, dizziness, and falls.    # Vasovagal Syncope  # Hyponatremia  Echo in 2019 with normal EF 55-60%. Recent episode of vasovagal syncope while using toilet. Chronic hyponatremia  - Encourage PO salt intake  - Drink at least 60 oz of water per day  - Eat small, regular meals to keep blood sugar levels adequate  - Avoid standing still for long time, or keep feet moving if you must stand  - Stand up slowly when sitting or laying down  - Strength training exercise, particularly for lower body  - Compression socks or garments, preferably up to waist  - Avoid hot showers and hot environments  - Try counter-pressure maneuvers if feeling symptomatic  Lay down and elevate legs in the air  Sit and put head between legs  Cross legs while squeezing the muscles in legs, abdomen, and buttocks  Tightly squeeze a stress ball  Hold one hand with the other and pull them away from one another     # HTN  BP elevated in clinic today at 155/83. Prior visits with BP range of 110-130/60-80. Per previous visit with Dr. Patton, due to symptoms of vasovagal syncope, allow for higher baseline BP of goal <150/90.    # Paroxysmal Atrial Fibrillation  Single " episode of AF 2/2 to influenza in 2019, but given potential infection triggering AF she was not anticoagulated. Recommended aspirin and metoprolol at 2019 visit, not on any cardiac medications currently. No noted recurrence of AF since 2019.      Follow up:  - as needed      RAQUEL Edge, GENOVEVAP-C  [facility] General Cardiology  Securely message with [employer] App   Text page via [email or web address] Paging/Directory          Chart review time: 10 minutes  Visit time: 26 minutes  Chart completion/documentation: 4 minutes  Total time spent: 40 minutes       Please do not hesitate to contact me if you have any questions/concerns.     Sincerely,     RAQUEL Edge CNP

## 2024-07-23 NOTE — PATIENT INSTRUCTIONS
Thank you for coming to the [facility] @ [place]; please note the following instructions:    - Encourage PO salt intake  - Drink at least 60 oz of water per day  - Eat small, regular meals to keep blood sugar levels adequate  - Avoid standing still for long time, or keep feet moving if you must stand  - Stand up slowly when sitting or laying down  - Strength training exercise, particularly for lower body  - Compression socks or garments, preferably up to waist  - Avoid hot showers and hot environments  - Try counter-pressure maneuvers if feeling symptomatic  Lay down and elevate legs in the air  Sit and put head between legs  Cross legs while squeezing the muscles in legs, abdomen, and buttocks  Tightly squeeze a stress ball  Hold one hand with the other and pull them away from one another   - Monitor BP if feeling especially unwell or dizzy    Follow up as needed or if you have new symptoms of chest pain, shortness of breath, or palpitations        If you have any questions regarding your visit please contact your care team:     Cardiology  Telephone Number   Deb JIMÉNEZ., RN  Caitlin DUQUE, RN  Alis CAUSEY, RN  Vaishali SOLORIO, JAZZY BRADY, JAZZY ARAYA, Visit Facilitator  Annie FUNES [facility] [phone] (option 1)   For scheduling appts:     [phone] (select option 1)       For the Device Clinic (Pacemakers and ICD's)  RN's :  Shanthi Henley   During business hours: [phone]    *After business hours:  [phone] (select option 4)      Normal test result notifications will be released via Hey[employer] or mailed within 7 business days.  All other test results, will be communicated via telephone once reviewed by your cardiologist.    If you need a medication refill please contact your pharmacy.  Please allow 3 business days for your refill to be completed.    As always, thank you for trusting us with your health care needs!

## 2024-07-23 NOTE — PROGRESS NOTES
General Cardiology Clinic - [place]      HPI: Ms. Bee Yepez is a 83 year old female presenting to cardiology clinic today for syncope. PMH significant for paroxysmal AF in setting of influenza (2019), cognitive decline, dizziness, and falls.    Prior clinic visit on 1/31/24 with Dr. Patton for HTN. She also endorsed frequent dizziness. Blood pressure goals were increased to <150/90, and medication treatment was not recommended, as patient has had normal pressures for several months prior to visit.    Recent ER visit for Syncope at [facility] on 7/20. Patient reports that she was with her assisted living facility staff in the bathroom when she had a syncopal episode on the toilet while having a bowel movement. Endorses she has been having diarrhea recently. Also endorses vomiting after her syncopal episode. Notes she was unconsciousness for about 10 seconds per staff member and remembers hearing her staff talk to her but not being able to respond.     Today in clinic, she denies any current chest pain, palpitations, dizziness, lower extremity edema, shortness of breath, BENDER, or PND. She goes to exercise classes 2-3 times a week at her assisted living facility but otherwise she is very sedentary. Patient is able to tolerate classes well and doesn't feel like they impact her symptoms.    Patient's son is present with her in clinic today, and endorses that patient does not hydrate well, and he feels like could be contributing to past symptoms as well.      Current Cardiac Medications:  none      PAST MEDICAL HISTORY:  Past Medical History:   Diagnosis Date    Ex-smoker     3 PPD for 25 years ago, quit in 1985.    Family history of breast cancer     sister at age 45    Family history of colon cancer     sister in her 60s    Osteopenia     per patient report.       CURRENT MEDICATIONS:  Current Outpatient Medications   Medication Sig Dispense Refill    acetaminophen (TYLENOL) 500 MG tablet Take 500-1,000 mg by  mouth every 6 hours as needed for mild pain      calcium carbonate-vitamin D 600-400 MG-UNIT CHEW Take 1 chew tab by mouth 2 times daily For low bone density      carBAMazepine (TEGRETOL XR) 200 MG 12 hr tablet Take 1 tablet (200 mg) by mouth 2 times daily For trigeminal neuralgia      Cyanocobalamin (B-12 PO) One tablet by mouth daily for b12 deficiency      magnesium 250 MG tablet Take 1 tablet by mouth 2 times daily For muscle cramps      melatonin 5 MG CAPS Take by mouth nightly as needed      cetirizine (ZYRTEC) 10 MG tablet Take 10 mg by mouth daily as needed for allergies (Patient not taking: Reported on 3/27/2024)      diclofenac (VOLTAREN) 1 % topical gel Apply 4 g topically 4 times daily (Patient not taking: Reported on 3/27/2024) 350 g 11       PAST SURGICAL HISTORY:  Past Surgical History:   Procedure Laterality Date    CHOLECYSTECTOMY      in the 1970s    TUBAL LIGATION         ALLERGIES:  Allergies   Allergen Reactions    Seasonal Allergies Other (See Comments)     Sneezing and congestion        FAMILY HISTORY:  Family History   Problem Relation Age of Onset    Coronary Artery Disease Father         had a pacemaker    Breast Cancer Sister          at age 45    Colon Cancer Sister         Dx in her 60s    Lung Cancer Sister         Dx in her 60s    Diabetes No family hx of        SOCIAL HISTORY:  Social History     Tobacco Use    Smoking status: Former    Smokeless tobacco: Never   Vaping Use    Vaping status: Never Used   Substance Use Topics    Alcohol use: No     Alcohol/week: 0.0 standard drinks of alcohol    Drug use: No       ROS:   Constitutional: No fever, chills, or sweats.  Cardiovascular: As per HPI.       Exam:  BP (!) 155/83 (BP Location: Right arm, Patient Position: Chair, Cuff Size: Adult Regular)   Pulse 84   Wt 74.4 kg (164 lb)   LMP  (LMP Unknown)   SpO2 94%   BMI 28.15 kg/m      GENERAL: alert and no distress  HEENT: no icterus, no central cyanosis  LYMPH/NECK: no adenopathy,  "no asymmetry  RESPIRATORY: lungs clear to auscultation - no rales, rhonchi or wheezes, no use of accessory muscles, respirations are unlabored, normal respiratory rate  CARDIOVASCULAR: RRR, +S1S2, no murmur, rub, or gallop.   GI: soft, non tender  EXTREMITIES: peripheral pulses normal, no peripheral edema  NEURO: alert, normal speech and affect  SKIN: no jaundice, no rashes or lesions      Labs:  Lab Results   Component Value Date    WBC 9.1 06/24/2024    WBC 9.0 06/09/2020    RBC 3.77 (L) 06/24/2024    RBC 3.87 06/09/2020    HGB 12.7 06/24/2024    HGB 12.8 06/09/2020    HCT 37.5 06/24/2024    HCT 38.2 06/09/2020     06/24/2024    MCV 99 06/09/2020    MCH 33.7 (H) 06/24/2024    MCH 33.1 (H) 06/09/2020    MCHC 33.9 06/24/2024    MCHC 33.5 06/09/2020    RDW 13.2 06/24/2024    RDW 12.0 06/09/2020     06/24/2024     06/09/2020       Lab Results   Component Value Date     (L) 06/24/2024     06/09/2020    POTASSIUM 4.0 06/24/2024    POTASSIUM 4.4 06/09/2020    CHLORIDE 98 06/24/2024    CHLORIDE 101 06/09/2020    CO2 24 06/24/2024    CO2 26 06/09/2020    ANIONGAP 10 06/24/2024    ANIONGAP 8 06/09/2020     (H) 06/24/2024     (H) 02/11/2021    BUN 10.2 06/24/2024    BUN 14 06/09/2020    CR 0.98 (H) 06/24/2024    CR 0.87 06/09/2020    GFRESTIMATED 57 (L) 06/24/2024    GFRESTIMATED 62 06/09/2020    GFRESTBLACK 72 06/09/2020    SACHA 9.1 06/24/2024    SACHA 8.8 06/09/2020        No results found for: \"INR\"    No results found for: \"NTBNP\"    Lab Results   Component Value Date    CHOL 228 07/13/2023    CHOL 237 02/11/2021     Lab Results   Component Value Date    HDL 73 07/13/2023    HDL 58 02/11/2021     Lab Results   Component Value Date     07/13/2023     02/11/2021     Lab Results   Component Value Date    TRIG 159 07/13/2023    TRIG 170 02/11/2021     No results found for: \"CHOLHDLRATIO\"    Diagnostics    Echocardiogram 2019  Technically difficult study - contrast was " used to enhance endocardial definition due to   suboptimal image quality.    Normal left ventricular ejection fraction estimated at 55-60%.    Aortic valve sclerosis without significant stenosis.    Normal estimated PA pressures on this study.    Normal IVC size.    Estimated EF: 55-60%       Assessment and Plan:   Ms. Yepez is a 83 year old female with a PMH of paroxysmal AF in setting of influenza (2019), cognitive decline, dizziness, and falls.    # Vasovagal Syncope  # Hyponatremia  Echo in 2019 with normal EF 55-60%. Recent episode of vasovagal syncope while using toilet. Chronic hyponatremia  - Encourage PO salt intake  - Drink at least 60 oz of water per day  - Eat small, regular meals to keep blood sugar levels adequate  - Avoid standing still for long time, or keep feet moving if you must stand  - Stand up slowly when sitting or laying down  - Strength training exercise, particularly for lower body  - Compression socks or garments, preferably up to waist  - Avoid hot showers and hot environments  - Try counter-pressure maneuvers if feeling symptomatic  Lay down and elevate legs in the air  Sit and put head between legs  Cross legs while squeezing the muscles in legs, abdomen, and buttocks  Tightly squeeze a stress ball  Hold one hand with the other and pull them away from one another     # HTN  BP elevated in clinic today at 155/83. Prior visits with BP range of 110-130/60-80. Per previous visit with Dr. Patton, due to symptoms of vasovagal syncope, allow for higher baseline BP of goal <150/90.    # Paroxysmal Atrial Fibrillation  Single episode of AF 2/2 to influenza in 2019, but given potential infection triggering AF she was not anticoagulated. Recommended aspirin and metoprolol at 2019 visit, not on any cardiac medications currently. No noted recurrence of AF since 2019.      Follow up:  - as needed      RAQUEL Edge, FNP-C  [facility] General Cardiology  Securely message with [employer]   Text page via [employer]  Paging/Directory          Chart review time: 10 minutes  Visit time: 26 minutes  Chart completion/documentation: 4 minutes  Total time spent: 40 minutes

## 2024-07-25 ENCOUNTER — MEDICAL CORRESPONDENCE (OUTPATIENT)
Dept: HEALTH INFORMATION MANAGEMENT | Facility: CLINIC | Age: 84
End: 2024-07-25

## 2024-07-30 ENCOUNTER — TELEPHONE (OUTPATIENT)
Dept: FAMILY MEDICINE | Facility: CLINIC | Age: 84
End: 2024-07-30
Payer: COMMERCIAL

## 2024-07-30 NOTE — TELEPHONE ENCOUNTER
Forms received from: [facility]    Phone number listed: [phone]   Fax listed: [phone]  Date received: 7/25/24  Form description:  PT outpatient clinic PT recert/updated plan/CO  Once forms are completed, please return to [facility] via fax.  Is patient requesting to be contacted when forms are completed: na  Phone: na  Form placed:  Marisol Young

## 2024-07-31 ENCOUNTER — TRANSFERRED RECORDS (OUTPATIENT)
Dept: HEALTH INFORMATION MANAGEMENT | Facility: CLINIC | Age: 84
End: 2024-07-31
Payer: COMMERCIAL

## 2024-08-22 ENCOUNTER — TELEPHONE (OUTPATIENT)
Dept: FAMILY MEDICINE | Facility: CLINIC | Age: 84
End: 2024-08-22
Payer: COMMERCIAL

## 2024-08-22 NOTE — TELEPHONE ENCOUNTER
Forms received from: [facility]    Phone number listed: [phone]   Fax listed: [phone]  Date received: 8/20/24  Form description:  OT Eval & plan of treatment/PT recert/updated plan  Once forms are completed, please return to [facility] via fax.  Is patient requesting to be contacted when forms are completed: na  Phone: na  Form placed:  Marisol Young

## 2024-08-27 ENCOUNTER — TELEPHONE (OUTPATIENT)
Dept: FAMILY MEDICINE | Facility: CLINIC | Age: 84
End: 2024-08-27
Payer: COMMERCIAL

## 2024-08-27 NOTE — TELEPHONE ENCOUNTER
Forms received from: [facility]    Phone number listed: [phone]   Fax listed: [phone]  Date received: 8/26/24  Form description:  Delayed therapy certification/Re certification/PT & OT  Once forms are completed, please return to [facility] via fax.  Is patient requesting to be contacted when forms are completed: na  Phone: na  Form placed:  Marisol Young

## 2024-08-30 ENCOUNTER — MEDICAL CORRESPONDENCE (OUTPATIENT)
Dept: HEALTH INFORMATION MANAGEMENT | Facility: CLINIC | Age: 84
End: 2024-08-30

## 2024-09-11 ENCOUNTER — TELEPHONE (OUTPATIENT)
Dept: FAMILY MEDICINE | Facility: CLINIC | Age: 84
End: 2024-09-11
Payer: COMMERCIAL

## 2024-09-11 NOTE — TELEPHONE ENCOUNTER
Home Care is calling regarding an established patient with [facility].       Requesting orders from: Marisol Sandhu  Provider is following patient: No       Orders Requested    Physical Therapy  Request for initial evaluation and treatment (one time) (first set of orders)   Frequency:  2x/wk for 3 wks  then 1x/wk for 4 wks      Information was gathered and will be sent to provider for review.  RN will contact Home Care with information after provider review.  Information was gathered and will be sent to provider to confirm provider will be following patient.  RN will contact Home Care with information after provider review.  Confirmed ok to leave a detailed message with call back.  Contact information confirmed and updated as needed.    Dena Barber RN

## 2024-09-11 NOTE — TELEPHONE ENCOUNTER
RN left detailed message on Rigo's personalized confidential voicemail.     Fabiana Yu RN on 9/11/2024 at 3:24 PM

## 2024-09-15 ENCOUNTER — HEALTH MAINTENANCE LETTER (OUTPATIENT)
Age: 84
End: 2024-09-15

## 2024-09-19 ENCOUNTER — TRANSFERRED RECORDS (OUTPATIENT)
Dept: HEALTH INFORMATION MANAGEMENT | Facility: CLINIC | Age: 84
End: 2024-09-19
Payer: COMMERCIAL

## 2024-09-23 ENCOUNTER — MEDICAL CORRESPONDENCE (OUTPATIENT)
Dept: HEALTH INFORMATION MANAGEMENT | Facility: CLINIC | Age: 84
End: 2024-09-23
Payer: COMMERCIAL

## 2024-09-23 ENCOUNTER — TELEPHONE (OUTPATIENT)
Dept: FAMILY MEDICINE | Facility: CLINIC | Age: 84
End: 2024-09-23
Payer: COMMERCIAL

## 2024-09-23 NOTE — TELEPHONE ENCOUNTER
Forms received from: Home Health Care    Phone number listed: [phone]   Fax listed: [phone]  Date received: 9/18/24  Form description: Physician order ID [id number] & Home health certification & plan of care ID [id number]  Once forms are completed, please return to Home Health Care via fax.  Is patient requesting to be contacted when forms are completed: na  Phone: na  Form placed:  Marisol Young

## 2024-10-07 ENCOUNTER — PATIENT OUTREACH (OUTPATIENT)
Dept: CARE COORDINATION | Facility: CLINIC | Age: 84
End: 2024-10-07
Payer: COMMERCIAL

## 2024-10-17 ENCOUNTER — TELEPHONE (OUTPATIENT)
Dept: FAMILY MEDICINE | Facility: CLINIC | Age: 84
End: 2024-10-17
Payer: COMMERCIAL

## 2024-10-17 NOTE — TELEPHONE ENCOUNTER
Forms received from: [facility]    Phone number listed: [phone]   Fax listed: [phone]  Date received: 10/17/24  Form description:  Outpatient clinic PT Recert/Updated plan/ME  Once forms are completed, please return to [facility] via fax.  Is patient requesting to be contacted when forms are completed: na  Phone: na  Form placed:  Marisol Young

## 2024-11-08 ENCOUNTER — TELEPHONE (OUTPATIENT)
Dept: FAMILY MEDICINE | Facility: CLINIC | Age: 84
End: 2024-11-08
Payer: COMMERCIAL

## 2024-11-08 NOTE — TELEPHONE ENCOUNTER
Forms received from: Home Health Care    Phone number listed: [phone]   Fax listed: [phone]  Date received: 11/7/24  Form description:  Home health certification & plan of care ID [id number]  Once forms are completed, please return to Home Health Care via fax.  Is patient requesting to be contacted when forms are completed: na  Phone: na  Form placed:  Marisol Young

## 2024-11-12 DIAGNOSIS — Z53.9 DIAGNOSIS NOT YET DEFINED: Primary | ICD-10-CM

## 2024-11-12 PROCEDURE — G0179 MD RECERTIFICATION HHA PT: HCPCS | Performed by: NURSE PRACTITIONER

## 2024-11-12 RX ORDER — POLYETHYLENE GLYCOL 3350 17 G/17G
17 POWDER, FOR SOLUTION ORAL DAILY
COMMUNITY
Start: 2024-08-21

## 2024-11-12 RX ORDER — MECLIZINE HYDROCHLORIDE 25 MG/1
25 TABLET ORAL 3 TIMES DAILY PRN
COMMUNITY

## 2024-11-12 RX ORDER — CALCIUM CARBONATE/VITAMIN D3 600 MG-10
1 TABLET ORAL DAILY
COMMUNITY

## 2024-11-12 RX ORDER — CARBAMAZEPINE 200 MG/1
200 TABLET ORAL 2 TIMES DAILY
COMMUNITY
Start: 2024-07-27 | End: 2024-11-12

## 2024-11-12 RX ORDER — OXYCODONE HYDROCHLORIDE 5 MG/1
2.5 TABLET ORAL DAILY PRN
COMMUNITY
Start: 2024-08-21

## 2024-11-12 RX ORDER — ASPIRIN 325 MG
325 TABLET, DELAYED RELEASE (ENTERIC COATED) ORAL DAILY
COMMUNITY

## 2024-11-12 RX ORDER — LIDOCAINE 4 G/G
1 PATCH TOPICAL EVERY 24 HOURS
COMMUNITY
Start: 2024-08-22

## 2024-11-13 ENCOUNTER — LAB REQUISITION (OUTPATIENT)
Dept: LAB | Facility: CLINIC | Age: 84
End: 2024-11-13
Payer: COMMERCIAL

## 2024-11-13 DIAGNOSIS — N39.41 URGE INCONTINENCE: ICD-10-CM

## 2024-11-13 DIAGNOSIS — R30.0 DYSURIA: ICD-10-CM

## 2024-11-13 DIAGNOSIS — R35.0 FREQUENCY OF MICTURITION: ICD-10-CM

## 2024-11-13 LAB
ALBUMIN UR-MCNC: 30 MG/DL
APPEARANCE UR: ABNORMAL
BILIRUB UR QL STRIP: NEGATIVE
COLOR UR AUTO: ABNORMAL
GLUCOSE UR STRIP-MCNC: NEGATIVE MG/DL
HGB UR QL STRIP: ABNORMAL
KETONES UR STRIP-MCNC: NEGATIVE MG/DL
LEUKOCYTE ESTERASE UR QL STRIP: ABNORMAL
MUCOUS THREADS #/AREA URNS LPF: PRESENT /LPF
NITRATE UR QL: NEGATIVE
PH UR STRIP: 5.5 [PH] (ref 5–7)
RBC URINE: 4 /HPF
SP GR UR STRIP: 1.02 (ref 1–1.03)
SQUAMOUS EPITHELIAL: 12 /HPF
UROBILINOGEN UR STRIP-MCNC: NORMAL MG/DL
WBC CLUMPS #/AREA URNS HPF: PRESENT /HPF
WBC URINE: >182 /HPF

## 2024-11-13 PROCEDURE — 81001 URINALYSIS AUTO W/SCOPE: CPT | Mod: ORL | Performed by: NURSE PRACTITIONER

## 2024-11-13 PROCEDURE — 87086 URINE CULTURE/COLONY COUNT: CPT | Mod: ORL | Performed by: NURSE PRACTITIONER

## 2024-11-15 LAB — BACTERIA UR CULT: NORMAL

## 2024-11-22 ENCOUNTER — TRANSFERRED RECORDS (OUTPATIENT)
Dept: HEALTH INFORMATION MANAGEMENT | Facility: CLINIC | Age: 84
End: 2024-11-22
Payer: COMMERCIAL

## 2024-11-27 ENCOUNTER — TELEPHONE (OUTPATIENT)
Dept: FAMILY MEDICINE | Facility: CLINIC | Age: 84
End: 2024-11-27
Payer: COMMERCIAL

## 2024-11-27 NOTE — TELEPHONE ENCOUNTER
Home Care is calling regarding an established patient with [facility].       Requesting orders from: Marisol Sandhu  Provider is following patient: Yes  Is this a 60-day recertification request?  No    Orders Requested    Occupational Therapy  Request for initial evaluation and treatment (one time)       Verbal orders given.  Home Care will send orders for provider to sign.  Confirmed ok to leave a detailed message with call back.  Contact information confirmed and updated as needed.    Fabiana Yu RN

## 2024-12-02 ENCOUNTER — TELEPHONE (OUTPATIENT)
Dept: FAMILY MEDICINE | Facility: CLINIC | Age: 84
End: 2024-12-02
Payer: COMMERCIAL

## 2024-12-03 ENCOUNTER — MEDICAL CORRESPONDENCE (OUTPATIENT)
Dept: HEALTH INFORMATION MANAGEMENT | Facility: CLINIC | Age: 84
End: 2024-12-03
Payer: COMMERCIAL

## 2024-12-10 ENCOUNTER — MEDICAL CORRESPONDENCE (OUTPATIENT)
Dept: HEALTH INFORMATION MANAGEMENT | Facility: CLINIC | Age: 84
End: 2024-12-10
Payer: COMMERCIAL

## 2024-12-10 ENCOUNTER — TELEPHONE (OUTPATIENT)
Dept: FAMILY MEDICINE | Facility: CLINIC | Age: 84
End: 2024-12-10
Payer: COMMERCIAL

## 2024-12-10 NOTE — TELEPHONE ENCOUNTER
Forms received from: Home Health Care   Phone number listed: [phone]   Fax listed: [phone]  Date received: 12/10/24  Form description: OT evaluation   ID Order: [id number]  Once forms are completed, please return to home health via fax.  Is patient requesting to be contacted when forms are completed: na  Phone: na  Form placed: Provider soy Bullock

## 2024-12-17 ENCOUNTER — MEDICAL CORRESPONDENCE (OUTPATIENT)
Dept: HEALTH INFORMATION MANAGEMENT | Facility: CLINIC | Age: 84
End: 2024-12-17
Payer: COMMERCIAL

## 2024-12-17 ENCOUNTER — TELEPHONE (OUTPATIENT)
Dept: FAMILY MEDICINE | Facility: CLINIC | Age: 84
End: 2024-12-17
Payer: COMMERCIAL

## 2024-12-17 NOTE — TELEPHONE ENCOUNTER
Forms received from: home health care   Phone number listed: [phone]   Fax listed: [phone]  Date received: 12/17/24  Form description: discharge of physical therapy   ID number [id number]  Once forms are completed, please return to na via fax.  Is patient requesting to be contacted when forms are completed: na  Phone:   Form placed: provider soy Bullock

## 2025-01-09 ENCOUNTER — PATIENT OUTREACH (OUTPATIENT)
Dept: CARE COORDINATION | Facility: CLINIC | Age: 85
End: 2025-01-09
Payer: COMMERCIAL

## 2025-01-16 NOTE — PATIENT INSTRUCTIONS
Patient Education     Discharge Instructions for Atrial Fibrillation  You have been diagnosed with an abnormal heart rhythm called atrial fibrillation. With this condition, your heart s 2 upper chambers quiver rather than squeeze the blood out in a normal pattern. This leads to an irregular and sometimes rapid heartbeat. Some people will develop associated symptoms such as a flip-flopping heartbeat, chest pain, lightheadedness, or shortness of breath. Other people may have no symptoms at all. Atrial fibrillation is serious because it affects the heart s ability to fill with blood as it should. Blood clots may form. This increases the risk for stroke. Untreated atrial fibrillation can also lead to heart failure. Atrial fibrillation can be controlled. With treatment, most people with atrial fibrillation lead normal lives.  Treatment options  Recommended treatment for atrial fibrillation depends on your age, symptoms, how long you have had atrial fibrillation, and other factors. You will have a complete evaluation to find out if you have any abnormalities that caused your heart to go into atrial fibrillation. This might be blocked heart arteries or a thyroid problem. Your doctor will assess your particular case and discuss choices with you.  Treatment choices may include:    Treating an underlying disorder that puts you at risk for atrial fibrillation. For example, correcting an abnormal thyroid or electrolyte problem, or treating a blocked heart artery.    Restoring a normal heart rhythm with an electrical shock (cardioversion) or with an antiarrhythmic medicine (chemical cardioversion).    Using medicine to control your heart rate in atrial fibrillation.    Preventing the risk for blood clot and stroke using blood-thinning medicines. Your doctor will tell you what he or she recommends. Choices may include aspirin, clopidogrel, warfarin, dabigatran, rivaroxaban, apixaban, and edoxaban.    Doing catheter ablation or  Pt is here for NST visit with a OB F/U afterwards with Dr. Sutherland @1:30pm  GA:39w 3D  KATLYN:1.20.25  Indication: Poly/Abn findings.     a surgical maze procedure. These use different methods to destroy certain areas of heart tissue. This interrupts the electrical signals causing atrial fibrillation. One of these procedures may be a choice when medicines do not work, or as an alternative to long-term medicine.    Other treatment choices may be recommended for you by your doctor.  Managing risk factors for stroke and preventing heart failure are important parts of any treatment plan for atrial fibrillation.  Home care    Take your medicines exactly as directed. Don t skip doses.    Work with your doctor to find the right medicines and doses for you.    Learn to take your own pulse. Keep a record of your results. Ask your doctor which pulse rates mean that you need medical attention. Slowing your pulse is often the goal of treatment. Ask your doctor if it s OK for you to use an automatic machine to check your pulse at home. Sometimes these machines don t count the pulse correctly when you have atrial fibrillation.    Limit your intake of coffee, tea, cola, and other beverages with caffeine. Talk with your doctor about whether you should eliminate caffeine.    Avoid over-the-counter medicines that have caffeine in them.    Let your doctor know what medicines you take, including prescription and over-the-counter medicines, as well as any supplements. They interfere with some medicines given for atrial fibrillation.    Ask your doctor about whether you can drink alcohol. Some people need to avoid alcohol to better treat atrial fibrillation. If you are taking blood-thinner medicines, alcohol may interfere with them by increasing their effect.    Never take stimulants such as amphetamines or cocaine. These drugs can speed up your heart rate and trigger atrial fibrillation.  Follow-up care  Follow up with your doctor, or as advised.     When should I call my healthcare provider  Call your healthcare provider right away if you have any of the  following:    Weakness    Dizziness    Fainting    Fatigue    Shortness of breath    Chest pain with increased activity    A change in the usual regularity of your heartbeat, or an unusually fast heartbeat   Date Last Reviewed: 4/23/2016 2000-2018 [employer]. [address] [place] [address], [place] [address]. All rights reserved. This information is not intended as a substitute for professional medical care. Always follow your healthcare professional's instructions.

## 2025-02-01 ENCOUNTER — TRANSFERRED RECORDS (OUTPATIENT)
Dept: HEALTH INFORMATION MANAGEMENT | Facility: CLINIC | Age: 85
End: 2025-02-01

## 2025-02-24 ENCOUNTER — OFFICE VISIT (OUTPATIENT)
Dept: ENDOCRINOLOGY | Facility: CLINIC | Age: 85
End: 2025-02-24
Attending: NURSE PRACTITIONER
Payer: COMMERCIAL

## 2025-02-24 VITALS
DIASTOLIC BLOOD PRESSURE: 85 MMHG | BODY MASS INDEX: 28.67 KG/M2 | HEART RATE: 73 BPM | OXYGEN SATURATION: 99 % | WEIGHT: 167 LBS | SYSTOLIC BLOOD PRESSURE: 166 MMHG

## 2025-02-24 DIAGNOSIS — R79.89 ABNORMAL THYROID BLOOD TEST: ICD-10-CM

## 2025-02-24 DIAGNOSIS — M62.81 MUSCLE WEAKNESS (GENERALIZED): ICD-10-CM

## 2025-02-24 DIAGNOSIS — M81.0 AGE RELATED OSTEOPOROSIS, UNSPECIFIED PATHOLOGICAL FRACTURE PRESENCE: ICD-10-CM

## 2025-02-24 DIAGNOSIS — R63.4 WEIGHT LOSS: Primary | ICD-10-CM

## 2025-02-24 PROCEDURE — 99204 OFFICE O/P NEW MOD 45 MIN: CPT | Performed by: INTERNAL MEDICINE

## 2025-02-24 PROCEDURE — 3079F DIAST BP 80-89 MM HG: CPT | Performed by: INTERNAL MEDICINE

## 2025-02-24 PROCEDURE — 3077F SYST BP >= 140 MM HG: CPT | Performed by: INTERNAL MEDICINE

## 2025-02-24 NOTE — LETTER
2/24/2025      Bee Yepez  [address] [address]  [place] [address]      Dear Colleague,    Thank you for referring your patient, Bee Yepez, to the [facility]. Please see a copy of my visit note below.        Endocrine Clinic Consult      Bee Yepez is a 84 year old female who is being evaluated via a billable video visit.        Consulting provider: Marisol Sandhu NP  [address] [address] [facility]  [place] [id number]    Reason for consultation: Muscle weakness and increase thyroperoxidase antibodies      Assessment:  84-year-old woman with recent vertebral fractures in the C-spine and prior forearm fractures after fall from standing height high suspicious for osteoporosis, history of low sodium, and elevated thyroperoxidase antibodies.  Reviewed that elevated TPO antibodies in the setting of normal thyroid hormone levels do not present treatment indication.  Considering patient's low appetite muscle weakness and fatigue can consider checking for adrenal insufficiency.  Patient has not been on higher doses of glucocorticoids not at risk for iatrogenic adrenal insufficiency, however potentially could be autoimmune.  Reviewed that bone density scan would be indicated considering her fracture history and patient might require treatment.  However patient notes that she would primarily be interested in a tablet that makes her feel better and would rather avoid additional evaluations such as DEXA scan and medications for osteoporosis.  Reviewed lifestyle modifications including increased intake for calcium.  Will check vitamin D levels with next lab    Follow-up with primary care provider who is available to the patient and her institution and pts preference.    Plan:   Lab for coritsol, ACHT basic metabolic profile vitamin D PTH    The  Following reports and notes were reviewed       Amna Patel MD  Endocrinology and Diabetes  Telephone contact:  [facility] [place] [phone]  [facility] [phone]          HPI:   Bee Yepez is a 84 year old woman coming in in regards to elevated thyroid peroxidase antibodies.  These were obtained in March 2024 and patient had been feeling tired.  Patient never had thyroid condition before and has never been on thyroid medications.  Thinks there might be a family history of thyroid tissue disease in her mother.  Patient's notes fatigue and weakness.  She had been able to participate in exercise classes from March 2024 through August 24 at 3 when she sustained a fall and fracture of her C-spine.  The patient according to her daughter recovered relatively well while she was seen by physical therapy however has been more tired since discontinuation.  The patient thinks that she might be able to participate in gainful now as she has been feeling little bit better.  Patient had been this low appetite, no nausea no abdominal pain and sustained about 20 pound weight loss over the last several years.  Patient has dizziness particularly when turning her head, less so when changing position.  Patient used to have multiple falls however none since August as patient had been exceedingly careful.    Current Problem List:   Patient Active Problem List   Diagnosis     Family history of breast cancer     Family history of colon cancer     Trigeminal neuralgia     Osteopenia, unspecified location     Atrial fibrillation, unspecified type (H)     Neck pain     Fatigue, unspecified type     Spider vein, symptomatic     Spasm of muscle     Cognitive impairment     Fall at home     Fragments of torsion dystonia     Glaucoma     Neuropathy     Occipital neuralgia     Tobacco abuse, in remission     POLST (Physician Orders for Life-Sustaining Treatment)     Cognitive and behavioral changes     Sacroiliac joint pain     Diverticular disease of colon     Adult osteomalacia, unspecified     Troponin level elevated      Low sodium levels     Muscle weakness (generalized)           Past Medical and Past Surgical History:  Past Medical History:   Diagnosis Date     Ex-smoker     3 PPD for 25 years ago, quit in 1985.     Family history of breast cancer     sister at age 45     Family history of colon cancer     sister in her 60s     Osteopenia     per patient report.       Past Surgical History:   Procedure Laterality Date     CHOLECYSTECTOMY      in the 1970s     TUBAL LIGATION         Medications:   Current Outpatient Medications   Medication Sig Dispense Refill     acetaminophen (TYLENOL) 500 MG tablet Take 500-1,000 mg by mouth every 6 hours as needed for mild pain       aspirin (ASA) 325 MG EC tablet Take 325 mg by mouth daily.       calcium carbonate-vitamin D (CALTRATE) 600-10 MG-MCG per tablet Take 1 tablet by mouth daily.       calcium carbonate-vitamin D 600-400 MG-UNIT CHEW Take 1 chew tab by mouth 2 times daily For low bone density       carBAMazepine (TEGRETOL XR) 200 MG 12 hr tablet Take 1 tablet (200 mg) by mouth 2 times daily For trigeminal neuralgia       Cyanocobalamin (B-12 PO) One tablet by mouth daily for b12 deficiency       Lidocaine (LIDOCARE) 4 % Patch Place 1 patch onto the skin every 24 hours.       magnesium 250 MG tablet Take 1 tablet by mouth 2 times daily For muscle cramps       meclizine (ANTIVERT) 25 MG tablet Take 25 mg by mouth 3 times daily as needed.       melatonin 5 MG CAPS Take by mouth nightly as needed       oxyCODONE (ROXICODONE) 5 MG tablet Take 2.5 mg by mouth daily as needed for moderate to severe pain.       polyethylene glycol (MIRALAX) 17 g packet Take 17 g by mouth daily.         Allergies:   Allergies   Allergen Reactions     Seasonal Allergies Other (See Comments)     Sneezing and congestion        Social History     Tobacco Use     Smoking status: Former     Smokeless tobacco: Never   Substance Use Topics     Alcohol use: No     Alcohol/week: 0.0 standard drinks of alcohol        Family History   Problem Relation Age of Onset     Coronary Artery Disease Father         had a pacemaker     Breast Cancer Sister          at age 45     Colon Cancer Sister         Dx in her 60s     Lung Cancer Sister         Dx in her 60s     Diabetes No family hx of        Review of Systems:  12 point review of system was negative except for above mentioned    Physical Examination:  not currently breastfeeding.  There is no height or weight on file to calculate BMI.    Wt Readings from Last 6 Encounters:   24 74.4 kg (164 lb)   24 80.8 kg (178 lb 3.2 oz)   24 82.6 kg (182 lb)   10/25/23 82.4 kg (181 lb 9.6 oz)   23 81.6 kg (180 lb)   23 82.9 kg (182 lb 12.8 oz)       Physical Examination:  not currently breastfeeding.  There is no height or weight on file to calculate BMI.    Wt Readings from Last 4 Encounters:   24 74.4 kg (164 lb)   24 80.8 kg (178 lb 3.2 oz)   24 82.6 kg (182 lb)   10/25/23 82.4 kg (181 lb 9.6 oz)       General: Well appearing elderly woman in no distress, up and go slow,   Eyes: EOMI. Sclerae and conjunctivae are clear.   HENT: No thyromegaly or mass.    Lymphatic: No cervical or supraclavicular lymphadenopathy.  Skin: No rash or lesions. No abdominal striae.    Extremities: No peripheral edema.   Muskuloskeletal: rib-pelvis distance none, unable to get up without use of hands       Labs and Studies:   Lab Results   Component Value Date     (L) 2024    CO2 24 2024    CHLORIDE 98 2024    CR 0.98 (H) 2024    TRIG 159 (H) 2023    HDL 73 2023    HGB 12.7 2024     Recent Labs   Lab Test 24  0931 23  1144 21  1420   TSH 2.59 2.97 2.05                       Again, thank you for allowing me to participate in the care of your patient.        Sincerely,        Amna Patel MD    Electronically signed

## 2025-02-24 NOTE — NURSING NOTE
Bee Yepez's goals for this visit include:   Chief Complaint   Patient presents with    New Patient    Thyroid Disease     She requests these members of her care team be copied on today's visit information: Yes     PCP: Marisol Sandhu    Referring Provider:  Marisol aSndhu NP  [address] [place] [address]    BP (!) 142/88 (BP Location: Left arm, Patient Position: Sitting, Cuff Size: Adult Regular)   Pulse 85   Wt 75.8 kg (167 lb)   LMP  (LMP Unknown)   SpO2 99%   BMI 28.67 kg/m      Do you need any medication refills at today's visit? No      Sheron Cai, DILLON  Adult Endocrinology   [facility]

## 2025-02-24 NOTE — PATIENT INSTRUCTIONS
Welcome to the [facility] Endocrinology and Diabetes Clinics     Our Endocrinology Clinics are here to provide you with a team-based, collaborative approach in the diagnosis and treatment of patients with diabetes and endocrine disorders. The team is made up of Physicians, Physician Assistants, Certified Diabetes Educators, Registered Nurses, Medical Assistants, Emergency Medical Technicians, and many others, all of whom have the unified goal of providing our patients with high quality care.     Please see below for some helpful tips to best navigate and use the [facility] Endocrinology clinic:     [place] Respect: At [facility], we are committed to a respectful and safe space for all patients, visitors, and staff.  We believe that mutual respect between patients and their care team is the foundation of quality care.  It is our expectation that you will be treated with respect by your care team.  In turn, we ask that all communication with the care team (written and verbal) be respectful and free from profanity, threatening, or abusive language.  Disrespectful communication undermines our therapeutic relationship with you and may result in us being unable to continue to provide your care.    Refills: A provider must see you at least annually to prescribe and refill medications. This is to ensure your safety as well as meet insurance and compliance regulations.    Scheduling: Many of our Providers offer both in-person or video visits. Please call to schedule any needed follow ups as soon as possible because our provider schedules fill up very quickly. Our care team has the right to require an in-person visit when they believe that it is medically necessary. Please remember that for any virtual visits, you must be in the [facility] at the time of the visit, otherwise we are unable to see you and you will need to be rescheduled.    Missed Appointments: If you need to cancel or miss your  scheduled appointment, please call the clinic at [phone] to reschedule.  Please note if you repeatedly miss appointments or repeatedly miss appointments without calling to inform us ahead of time (no-show), the clinic may elect to not allow you to reschedule without speaking to a manager, may require a Partnership In Care Agreement prior to rescheduling, or could result in you no longer being able to receive care from the clinic. Providing the clinic with timely notification if you have to miss an appointment, allows us to better serve the needs of all of our patients.    Primary Care Provider: Our Endocrinologists are Specialists in their field. We expect you to have a Primary Care Provider established to handle any needs outside of your diabetes and endocrine care.  We would be happy to assist you find a Primary Care Provider, if you do not have one.    [employer]: [employer] is a wonderful resource that allows you access to your Care Team via online or the jac. Please ask a member of the team if you would like help creating an account. Please note that it may take up to 2 business days for a response. [employer] messages are not reviewed on weekends or after business hours.  Emergent or urgent care needs should never be communicated via [employer].  If you experience a medical emergency call 911 or go to the nearest emergency room.    Labs: It is recommended that you stay within the [facility] for labs but you are welcome to obtain ordered labs (with some exceptions) from any location of your choice as long as they are able to complete and process the needed labs. If you need us to fax orders to your preferred lab, please provide us the name and fax number of the lab you would like to go to so we can fax the orders. If your labs are drawn outside of the [facility], please have them fax the results to [phone] ([place]) or [phone] ([place]) or via [facility]. It is your  responsibility to ensure that outside lab results are sent to us.    We look forward to working with you. Please do not hesitate to reach out with any questions.    Thank you,    The Endocrine Team      Do fasting lab in the morning for Cortisol, ACTH, basic metabolic panel, Vitamin D, PTHi,         [facility] Address:   [place] Address:     [address] [place] [address]    Phone: [phone]  Fax: [phone] [address] [place] [address]    Phone: [phone]  Fax: [phone]     [facility] [place] Cost Estimate Phone Number: [phone]    General Lab and Imaging Scheduling Phone Number: [phone]      If you are interested in exploring research opportunities in the Division of Diabetes, Endocrinology and Metabolism and within the [facility] you are welcome to view the following QR codes by scanning them using your phone's camera to access a link to more information.  Participating in a research study is voluntary. Your decision whether or not to participate will not affect your current or future relations with the [facility] or [facility]. Current available studies are:     Type 1 Diabetes  Adults     Pediatrics     Type 2 Diabetes  Weight Loss - People Treated with Diet or Metformin Only     Pediatrics and Young Adults

## 2025-02-24 NOTE — PROGRESS NOTES
Endocrine Clinic Consult      Bee Yepez is a 84 year old female who is being evaluated via a billable video visit.        Consulting provider: Marisol Sandhu NP  [address] [address]  [place] [address]    Reason for consultation: Muscle weakness and increase thyroperoxidase antibodies      Assessment:  84-year-old woman with recent vertebral fractures in the C-spine and prior forearm fractures after fall from standing height high suspicious for osteoporosis, history of low sodium, and elevated thyroperoxidase antibodies.  Reviewed that elevated TPO antibodies in the setting of normal thyroid hormone levels do not present treatment indication.  Considering patient's low appetite muscle weakness and fatigue can consider checking for adrenal insufficiency.  Patient has not been on higher doses of glucocorticoids not at risk for iatrogenic adrenal insufficiency, however potentially could be autoimmune.  Reviewed that bone density scan would be indicated considering her fracture history and patient might require treatment.  However patient notes that she would primarily be interested in a tablet that makes her feel better and would rather avoid additional evaluations such as DEXA scan and medications for osteoporosis.  Reviewed lifestyle modifications including increased intake for calcium.  Will check vitamin D levels with next lab    Follow-up with primary care provider who is available to the patient and her institution and pts preference.    Plan:   Lab for coritsol, ACHT basic metabolic profile vitamin D PTH    The  Following reports and notes were reviewed       Amna Patel MD  Endocrinology and Diabetes  Telephone contact:  [facility] [place] [phone]  [facility] [phone]          HPI:   Bee Yepez is a 84 year old woman coming in in regards to elevated thyroid peroxidase antibodies.  These were obtained in March 2024 and  patient had been feeling tired.  Patient never had thyroid condition before and has never been on thyroid medications.  Thinks there might be a family history of thyroid tissue disease in her mother.  Patient's notes fatigue and weakness.  She had been able to participate in exercise classes from March 2024 through August 24 at 3 when she sustained a fall and fracture of her C-spine.  The patient according to her daughter recovered relatively well while she was seen by physical therapy however has been more tired since discontinuation.  The patient thinks that she might be able to participate in gainful now as she has been feeling little bit better.  Patient had been this low appetite, no nausea no abdominal pain and sustained about 20 pound weight loss over the last several years.  Patient has dizziness particularly when turning her head, less so when changing position.  Patient used to have multiple falls however none since August as patient had been exceedingly careful.    Current Problem List:   Patient Active Problem List   Diagnosis    Family history of breast cancer    Family history of colon cancer    Trigeminal neuralgia    Osteopenia, unspecified location    Atrial fibrillation, unspecified type (H)    Neck pain    Fatigue, unspecified type    Spider vein, symptomatic    Spasm of muscle    Cognitive impairment    Fall at home    Fragments of torsion dystonia    Glaucoma    Neuropathy    Occipital neuralgia    Tobacco abuse, in remission    POLST (Physician Orders for Life-Sustaining Treatment)    Cognitive and behavioral changes    Sacroiliac joint pain    Diverticular disease of colon    Adult osteomalacia, unspecified    Troponin level elevated    Low sodium levels    Muscle weakness (generalized)           Past Medical and Past Surgical History:  Past Medical History:   Diagnosis Date    Ex-smoker     3 PPD for 25 years ago, quit in 1985.    Family history of breast cancer     sister at age 45    Family  history of colon cancer     sister in her 60s    Osteopenia     per patient report.       Past Surgical History:   Procedure Laterality Date    CHOLECYSTECTOMY      in the 1970s    TUBAL LIGATION         Medications:   Current Outpatient Medications   Medication Sig Dispense Refill    acetaminophen (TYLENOL) 500 MG tablet Take 500-1,000 mg by mouth every 6 hours as needed for mild pain      aspirin (ASA) 325 MG EC tablet Take 325 mg by mouth daily.      calcium carbonate-vitamin D (CALTRATE) 600-10 MG-MCG per tablet Take 1 tablet by mouth daily.      calcium carbonate-vitamin D 600-400 MG-UNIT CHEW Take 1 chew tab by mouth 2 times daily For low bone density      carBAMazepine (TEGRETOL XR) 200 MG 12 hr tablet Take 1 tablet (200 mg) by mouth 2 times daily For trigeminal neuralgia      Cyanocobalamin (B-12 PO) One tablet by mouth daily for b12 deficiency      Lidocaine (LIDOCARE) 4 % Patch Place 1 patch onto the skin every 24 hours.      magnesium 250 MG tablet Take 1 tablet by mouth 2 times daily For muscle cramps      meclizine (ANTIVERT) 25 MG tablet Take 25 mg by mouth 3 times daily as needed.      melatonin 5 MG CAPS Take by mouth nightly as needed      oxyCODONE (ROXICODONE) 5 MG tablet Take 2.5 mg by mouth daily as needed for moderate to severe pain.      polyethylene glycol (MIRALAX) 17 g packet Take 17 g by mouth daily.         Allergies:   Allergies   Allergen Reactions    Seasonal Allergies Other (See Comments)     Sneezing and congestion        Social History     Tobacco Use    Smoking status: Former    Smokeless tobacco: Never   Substance Use Topics    Alcohol use: No     Alcohol/week: 0.0 standard drinks of alcohol       Family History   Problem Relation Age of Onset    Coronary Artery Disease Father         had a pacemaker    Breast Cancer Sister          at age 45    Colon Cancer Sister         Dx in her 60s    Lung Cancer Sister         Dx in her 60s    Diabetes No family hx of        Review of  Systems:  12 point review of system was negative except for above mentioned    Physical Examination:  not currently breastfeeding.  There is no height or weight on file to calculate BMI.    Wt Readings from Last 6 Encounters:   07/23/24 74.4 kg (164 lb)   03/14/24 80.8 kg (178 lb 3.2 oz)   01/31/24 82.6 kg (182 lb)   10/25/23 82.4 kg (181 lb 9.6 oz)   09/11/23 81.6 kg (180 lb)   07/13/23 82.9 kg (182 lb 12.8 oz)       Physical Examination:  not currently breastfeeding.  There is no height or weight on file to calculate BMI.    Wt Readings from Last 4 Encounters:   07/23/24 74.4 kg (164 lb)   03/14/24 80.8 kg (178 lb 3.2 oz)   01/31/24 82.6 kg (182 lb)   10/25/23 82.4 kg (181 lb 9.6 oz)       General: Well appearing elderly woman in no distress, up and go slow,   Eyes: EOMI. Sclerae and conjunctivae are clear.   HENT: No thyromegaly or mass.    Lymphatic: No cervical or supraclavicular lymphadenopathy.  Skin: No rash or lesions. No abdominal striae.    Extremities: No peripheral edema.   Muskuloskeletal: rib-pelvis distance none, unable to get up without use of hands       Labs and Studies:   Lab Results   Component Value Date     (L) 06/24/2024    CO2 24 06/24/2024    CHLORIDE 98 06/24/2024    CR 0.98 (H) 06/24/2024    TRIG 159 (H) 07/13/2023    HDL 73 07/13/2023    HGB 12.7 06/24/2024     Recent Labs   Lab Test 03/14/24  0931 07/13/23  1144 02/11/21  1420   TSH 2.59 2.97 2.05

## 2025-02-28 ENCOUNTER — LAB REQUISITION (OUTPATIENT)
Dept: LAB | Facility: CLINIC | Age: 85
End: 2025-02-28
Payer: COMMERCIAL

## 2025-02-28 DIAGNOSIS — M62.81 MUSCLE WEAKNESS (GENERALIZED): ICD-10-CM

## 2025-02-28 DIAGNOSIS — M81.0 AGE-RELATED OSTEOPOROSIS WITHOUT CURRENT PATHOLOGICAL FRACTURE: ICD-10-CM

## 2025-02-28 DIAGNOSIS — R63.4 ABNORMAL WEIGHT LOSS: ICD-10-CM

## 2025-03-03 LAB
ALBUMIN SERPL BCG-MCNC: 3.6 G/DL (ref 3.5–5.2)
ANION GAP SERPL CALCULATED.3IONS-SCNC: 11 MMOL/L (ref 7–15)
BUN SERPL-MCNC: 13 MG/DL (ref 8–23)
CALCIUM SERPL-MCNC: 9 MG/DL (ref 8.8–10.4)
CHLORIDE SERPL-SCNC: 96 MMOL/L (ref 98–107)
CORTIS SERPL-MCNC: 12.7 UG/DL
CREAT SERPL-MCNC: 0.96 MG/DL (ref 0.51–0.95)
EGFRCR SERPLBLD CKD-EPI 2021: 58 ML/MIN/1.73M2
GLUCOSE SERPL-MCNC: 107 MG/DL (ref 70–99)
HCO3 SERPL-SCNC: 23 MMOL/L (ref 22–29)
POTASSIUM SERPL-SCNC: 4.9 MMOL/L (ref 3.4–5.3)
PTH-INTACT SERPL-MCNC: 23 PG/ML (ref 15–65)
SODIUM SERPL-SCNC: 130 MMOL/L (ref 135–145)
VIT D+METAB SERPL-MCNC: 35 NG/ML (ref 20–50)

## 2025-03-03 PROCEDURE — 36415 COLL VENOUS BLD VENIPUNCTURE: CPT | Mod: ORL | Performed by: INTERNAL MEDICINE

## 2025-03-03 PROCEDURE — 82306 VITAMIN D 25 HYDROXY: CPT | Mod: ORL | Performed by: INTERNAL MEDICINE

## 2025-03-03 PROCEDURE — 82533 TOTAL CORTISOL: CPT | Mod: ORL | Performed by: INTERNAL MEDICINE

## 2025-03-03 PROCEDURE — P9603 ONE-WAY ALLOW PRORATED MILES: HCPCS | Mod: ORL | Performed by: INTERNAL MEDICINE

## 2025-03-03 PROCEDURE — 82040 ASSAY OF SERUM ALBUMIN: CPT | Mod: ORL | Performed by: INTERNAL MEDICINE

## 2025-03-03 PROCEDURE — 83970 ASSAY OF PARATHORMONE: CPT | Mod: ORL | Performed by: INTERNAL MEDICINE

## 2025-03-03 PROCEDURE — 80048 BASIC METABOLIC PNL TOTAL CA: CPT | Mod: ORL | Performed by: INTERNAL MEDICINE

## 2025-03-12 ENCOUNTER — TELEPHONE (OUTPATIENT)
Dept: ENDOCRINOLOGY | Facility: CLINIC | Age: 85
End: 2025-03-12
Payer: COMMERCIAL

## 2025-03-12 DIAGNOSIS — E87.1 HYPONATREMIA: Primary | ICD-10-CM

## 2025-03-28 ENCOUNTER — LAB REQUISITION (OUTPATIENT)
Dept: LAB | Facility: CLINIC | Age: 85
End: 2025-03-28
Payer: COMMERCIAL

## 2025-03-28 DIAGNOSIS — N39.0 URINARY TRACT INFECTION, SITE NOT SPECIFIED: ICD-10-CM

## 2025-03-28 LAB
ALBUMIN UR-MCNC: 50 MG/DL
APPEARANCE UR: ABNORMAL
BACTERIA #/AREA URNS HPF: ABNORMAL /HPF
BILIRUB UR QL STRIP: NEGATIVE
COLOR UR AUTO: ABNORMAL
GLUCOSE UR STRIP-MCNC: NEGATIVE MG/DL
HGB UR QL STRIP: ABNORMAL
KETONES UR STRIP-MCNC: NEGATIVE MG/DL
LEUKOCYTE ESTERASE UR QL STRIP: ABNORMAL
MUCOUS THREADS #/AREA URNS LPF: PRESENT /LPF
NITRATE UR QL: NEGATIVE
PH UR STRIP: 5.5 [PH] (ref 5–7)
RBC URINE: 25 /HPF
SP GR UR STRIP: 1.01 (ref 1–1.03)
SQUAMOUS EPITHELIAL: 4 /HPF
UROBILINOGEN UR STRIP-MCNC: NORMAL MG/DL
WBC CLUMPS #/AREA URNS HPF: PRESENT /HPF
WBC URINE: >182 /HPF

## 2025-03-28 PROCEDURE — 81001 URINALYSIS AUTO W/SCOPE: CPT | Mod: ORL | Performed by: NURSE PRACTITIONER

## 2025-03-28 PROCEDURE — 87086 URINE CULTURE/COLONY COUNT: CPT | Mod: ORL | Performed by: NURSE PRACTITIONER

## 2025-03-30 LAB — BACTERIA UR CULT: NORMAL

## 2025-04-01 ENCOUNTER — TELEPHONE (OUTPATIENT)
Dept: FAMILY MEDICINE | Facility: CLINIC | Age: 85
End: 2025-04-01
Payer: COMMERCIAL

## 2025-04-01 NOTE — TELEPHONE ENCOUNTER
Patient Quality Outreach    Patient is due for the following:   Hypertension -  BP check and Hypertension follow-up visit  Physical Medicare Annual Wellness Visit      Topic Date Due    Pneumococcal Vaccine (1 of 1 - PCV) Never done    Zoster (Shingles) Vaccine (1 of 2) Never done    COVID-19 Vaccine (4 - 2024-25 season) 09/01/2024   Type of outreach:    Sent [employer] message.  Questions for provider review:    None       Bee Sandoval MA  Chart routed to Care Team.

## 2025-05-21 ENCOUNTER — PATIENT OUTREACH (OUTPATIENT)
Dept: CARE COORDINATION | Facility: CLINIC | Age: 85
End: 2025-05-21
Payer: COMMERCIAL

## 2025-05-21 NOTE — PROGRESS NOTES
[facility]  TCU Monitoring    Clinical Data: Patient was identified from [facility] payor report and has transitioned to TCU/ARU for short term rehabilitation:    Facility Name: [facility]  Transition Communication:  Notified facility of Ambulatory Care Management TCU monitoring process via Epic fax.    Assessment/Plan: Patient will be monitored by Ambulatory Care Management to identify patient's discharge plans/need. Care  will review chart and outreach to facility staff every 3-4 weeks and as needed.     Georgie Matthews  Care Transitions Assistant  [facility]

## 2025-05-21 NOTE — LETTER
[facility]   To:     Please give to facility    From:   Claire Matthews MA  Care Transitions Assistant    Ambulatory Care Management team  [facility]   Phone: [phone]/ [email or web address]  Patient Name:  Bee Yepez YOB: 1940   Admit date: 5/20/2025      *Information Needed:  Please contact me when the patient will discharge (or if they will move to long term care)- include the discharge date, disposition, and main diagnosis   If the patient is discharged with home care services, please provide the name of the agency.       Thank you!           Electronically signed

## 2025-06-03 ENCOUNTER — TELEPHONE (OUTPATIENT)
Dept: FAMILY MEDICINE | Facility: CLINIC | Age: 85
End: 2025-06-03
Payer: COMMERCIAL

## 2025-06-03 ENCOUNTER — PATIENT OUTREACH (OUTPATIENT)
Dept: CARE COORDINATION | Facility: CLINIC | Age: 85
End: 2025-06-03
Payer: COMMERCIAL

## 2025-06-03 NOTE — TELEPHONE ENCOUNTER
FYI - Status Update    Who is Calling: home health care- [facility]    Update: [facility] calling to see if PCP is okay following patients home care order and plan of care for signing off      Does caller want a call/response back: Yes     Could we send this information to you in [place] or would you prefer to receive a phone call?:   Patient would prefer a phone call   Okay to leave a detailed message?: N/A at Other phone number:  [phone] extension [phone]*  would like call back

## 2025-06-03 NOTE — TELEPHONE ENCOUNTER
Notified Salud at [facility], of the orders below per PCP.  Also, advised to have patient schedule a follow up appointment after TCU visit.    Elin BARLOW RN  Triage Nurse  [facility]

## 2025-06-03 NOTE — PROGRESS NOTES
[facility]  TCU Discharge    Clinical Data: Per chart review, patient has discharged from TCU to home/community setting.    Assessment/Plan: Transitional Care Management (TCM) program initiated to prompt post-discharge outreach call by Psychiatric team member.     Georgie Matthews  Care Transitions Assistant  [facility]

## 2025-06-03 NOTE — LETTER
Bee Yepez  [id number] [facility] [address]  [facility] [address]    Dear Bee Yepez,      I am a team member within the [facility] with [facility]. I recently tried to reach you to ensure you were doing well following a recent visit within our health system. I also wanted to take this chance to introduce Clinic Care Coordination.     Below is a description of Clinic Care Coordination and how this team can further assist you:       The Clinic Care Coordination team is made up of a Registered Nurse, , Financial Resource Worker, and a Community Health Worker who understand and can help navigate the health care system. The goal of clinic care coordination is to help you manage your health, improve access to care, and achieve optimal health outcomes. They work alongside your provider to assist you in determining your health and social needs, obtain health care and community resources, and provide you with necessary information and education. Clinic Care Coordination can work with you through any barriers and develop a care plan that helps coordinate and strengthen the relationship between you and your care team.    If you wish to connect with the Clinic Care Coordination Team, please let your [facility] Primary Care Provider or Clinic Care Team know and they can place a referral. The Clinic Care Coordination team will then reach out by phone to further support you.    We are focused on providing you with the highest-quality healthcare experience possible.    Sincerely,   Your care team with [facility]'s [address] [place] [address] ([phone]).    Anastasiia Cole, [facility]  Care Coordination

## 2025-06-04 NOTE — PROGRESS NOTES
Connected Care Resource Center Contact  [facility]/Voicemail     Clinical Data: Post-Discharge Outreach     Outreach attempted x 2.  Left message on patient's voicemail, providing [facility]'s central phone number of [phone] ([phone]) for questions/concerns and/or to schedule an appt with an [facility] provider, if they do not have a PCP.     Per chart review:  Patient discharged to [facility]: unknown if it's more independent or assisted living    Patient is Enrolled with [facility] services: home care has been in communication with patient's care team regarding TCU follow-up     Plan:  Care Coordination introduction letter with explanation of Clinic Care Coordination services sent to patient via [employer]. Clinic Care Coordination services remain available via referral if needed. Connected Care Resource Center will do no further outreaches at this time.       Anastasiia Coel MA  Connected Care Resource Center, [facility]    *Connected Care Resource Team does NOT follow patient ongoing. Referrals are identified based on internal discharge reports and the outreach is to ensure patient has an understanding of their discharge instructions.

## 2025-06-10 ENCOUNTER — LAB REQUISITION (OUTPATIENT)
Dept: LAB | Facility: CLINIC | Age: 85
End: 2025-06-10
Payer: COMMERCIAL

## 2025-06-10 DIAGNOSIS — E87.1 HYPO-OSMOLALITY AND HYPONATREMIA: ICD-10-CM

## 2025-06-16 LAB
ANION GAP SERPL CALCULATED.3IONS-SCNC: 12 MMOL/L (ref 7–15)
BUN SERPL-MCNC: 10.7 MG/DL (ref 8–23)
CALCIUM SERPL-MCNC: 8.7 MG/DL (ref 8.8–10.4)
CHLORIDE SERPL-SCNC: 95 MMOL/L (ref 98–107)
CREAT SERPL-MCNC: 0.9 MG/DL (ref 0.51–0.95)
EGFRCR SERPLBLD CKD-EPI 2021: 62 ML/MIN/1.73M2
GLUCOSE SERPL-MCNC: 96 MG/DL (ref 70–99)
HCO3 SERPL-SCNC: 21 MMOL/L (ref 22–29)
POTASSIUM SERPL-SCNC: 4.5 MMOL/L (ref 3.4–5.3)
SODIUM SERPL-SCNC: 128 MMOL/L (ref 135–145)

## 2025-06-16 PROCEDURE — P9604 ONE-WAY ALLOW PRORATED TRIP: HCPCS | Mod: ORL | Performed by: NURSE PRACTITIONER

## 2025-06-16 PROCEDURE — 80048 BASIC METABOLIC PNL TOTAL CA: CPT | Mod: ORL | Performed by: NURSE PRACTITIONER

## 2025-06-16 PROCEDURE — 36415 COLL VENOUS BLD VENIPUNCTURE: CPT | Mod: ORL | Performed by: NURSE PRACTITIONER

## 2025-06-17 ENCOUNTER — TRANSFERRED RECORDS (OUTPATIENT)
Dept: HEALTH INFORMATION MANAGEMENT | Facility: CLINIC | Age: 85
End: 2025-06-17
Payer: COMMERCIAL

## 2025-06-18 ENCOUNTER — LAB REQUISITION (OUTPATIENT)
Dept: LAB | Facility: CLINIC | Age: 85
End: 2025-06-18
Payer: COMMERCIAL

## 2025-06-18 DIAGNOSIS — E87.1 HYPO-OSMOLALITY AND HYPONATREMIA: ICD-10-CM

## 2025-06-23 LAB
ANION GAP SERPL CALCULATED.3IONS-SCNC: 12 MMOL/L (ref 7–15)
BUN SERPL-MCNC: 10.6 MG/DL (ref 8–23)
CALCIUM SERPL-MCNC: 9.1 MG/DL (ref 8.8–10.4)
CHLORIDE SERPL-SCNC: 98 MMOL/L (ref 98–107)
CREAT SERPL-MCNC: 0.95 MG/DL (ref 0.51–0.95)
EGFRCR SERPLBLD CKD-EPI 2021: 58 ML/MIN/1.73M2
GLUCOSE SERPL-MCNC: 109 MG/DL (ref 70–99)
HCO3 SERPL-SCNC: 23 MMOL/L (ref 22–29)
POTASSIUM SERPL-SCNC: 4.2 MMOL/L (ref 3.4–5.3)
SODIUM SERPL-SCNC: 133 MMOL/L (ref 135–145)

## 2025-06-23 PROCEDURE — 80048 BASIC METABOLIC PNL TOTAL CA: CPT | Mod: ORL | Performed by: NURSE PRACTITIONER

## 2025-06-23 PROCEDURE — P9604 ONE-WAY ALLOW PRORATED TRIP: HCPCS | Mod: ORL | Performed by: NURSE PRACTITIONER

## 2025-06-23 PROCEDURE — 36415 COLL VENOUS BLD VENIPUNCTURE: CPT | Mod: ORL | Performed by: NURSE PRACTITIONER

## 2025-07-30 ENCOUNTER — LAB REQUISITION (OUTPATIENT)
Dept: LAB | Facility: CLINIC | Age: 85
End: 2025-07-30
Payer: COMMERCIAL

## 2025-07-30 DIAGNOSIS — E87.1 HYPO-OSMOLALITY AND HYPONATREMIA: ICD-10-CM

## 2025-08-04 LAB
ANION GAP SERPL CALCULATED.3IONS-SCNC: 9 MMOL/L (ref 7–15)
BUN SERPL-MCNC: 10.6 MG/DL (ref 8–23)
CALCIUM SERPL-MCNC: 8.6 MG/DL (ref 8.8–10.4)
CHLORIDE SERPL-SCNC: 97 MMOL/L (ref 98–107)
CREAT SERPL-MCNC: 0.91 MG/DL (ref 0.51–0.95)
EGFRCR SERPLBLD CKD-EPI 2021: 62 ML/MIN/1.73M2
GLUCOSE SERPL-MCNC: 82 MG/DL (ref 70–99)
HCO3 SERPL-SCNC: 23 MMOL/L (ref 22–29)
POTASSIUM SERPL-SCNC: 4.1 MMOL/L (ref 3.4–5.3)
SODIUM SERPL-SCNC: 129 MMOL/L (ref 135–145)

## 2025-08-04 PROCEDURE — 36415 COLL VENOUS BLD VENIPUNCTURE: CPT | Mod: ORL | Performed by: NURSE PRACTITIONER

## 2025-08-04 PROCEDURE — P9603 ONE-WAY ALLOW PRORATED MILES: HCPCS | Mod: ORL | Performed by: NURSE PRACTITIONER

## 2025-08-04 PROCEDURE — 80048 BASIC METABOLIC PNL TOTAL CA: CPT | Mod: ORL | Performed by: NURSE PRACTITIONER

## 2025-08-05 ENCOUNTER — LAB REQUISITION (OUTPATIENT)
Dept: LAB | Facility: CLINIC | Age: 85
End: 2025-08-05
Payer: COMMERCIAL

## 2025-08-05 ENCOUNTER — TELEPHONE (OUTPATIENT)
Dept: FAMILY MEDICINE | Facility: CLINIC | Age: 85
End: 2025-08-05
Payer: COMMERCIAL

## 2025-08-05 DIAGNOSIS — Z00.00 ENCOUNTER FOR GENERAL ADULT MEDICAL EXAMINATION WITHOUT ABNORMAL FINDINGS: ICD-10-CM

## 2025-08-11 LAB — MAGNESIUM SERPL-MCNC: 2.1 MG/DL (ref 1.7–2.3)

## 2025-08-11 PROCEDURE — P9604 ONE-WAY ALLOW PRORATED TRIP: HCPCS | Mod: ORL | Performed by: NURSE PRACTITIONER

## 2025-08-11 PROCEDURE — 36415 COLL VENOUS BLD VENIPUNCTURE: CPT | Mod: ORL | Performed by: NURSE PRACTITIONER

## 2025-08-11 PROCEDURE — 83735 ASSAY OF MAGNESIUM: CPT | Mod: ORL | Performed by: NURSE PRACTITIONER
